# Patient Record
Sex: FEMALE | Race: BLACK OR AFRICAN AMERICAN | Employment: UNEMPLOYED | URBAN - METROPOLITAN AREA
[De-identification: names, ages, dates, MRNs, and addresses within clinical notes are randomized per-mention and may not be internally consistent; named-entity substitution may affect disease eponyms.]

---

## 2017-01-11 ENCOUNTER — TELEPHONE (OUTPATIENT)
Dept: FAMILY MEDICINE CLINIC | Age: 72
End: 2017-01-11

## 2017-01-16 NOTE — TELEPHONE ENCOUNTER
Received [employer] authorization, auth # [id number], patient approved for 99 visits at [address] and [facility] faxed Brown authorization to [address] [facility] @ Fax [phone] with confirmation of receipt received. Office will contact Rhonda Villagran to schedule or I will schedule pt an appt if needed.

## 2017-01-31 ENCOUNTER — OFFICE VISIT (OUTPATIENT)
Dept: FAMILY MEDICINE CLINIC | Age: 72
End: 2017-01-31

## 2017-01-31 VITALS
HEIGHT: 56 IN | OXYGEN SATURATION: 92 % | BODY MASS INDEX: 25.42 KG/M2 | WEIGHT: 113 LBS | TEMPERATURE: 95.8 F | DIASTOLIC BLOOD PRESSURE: 67 MMHG | SYSTOLIC BLOOD PRESSURE: 141 MMHG | RESPIRATION RATE: 16 BRPM | HEART RATE: 56 BPM

## 2017-01-31 DIAGNOSIS — E11.65 TYPE 2 DIABETES MELLITUS WITH HYPERGLYCEMIA, WITHOUT LONG-TERM CURRENT USE OF INSULIN (HCC): Primary | ICD-10-CM

## 2017-01-31 DIAGNOSIS — I10 ESSENTIAL HYPERTENSION: Chronic | ICD-10-CM

## 2017-01-31 DIAGNOSIS — D50.9 IRON DEFICIENCY ANEMIA, UNSPECIFIED IRON DEFICIENCY ANEMIA TYPE: ICD-10-CM

## 2017-01-31 DIAGNOSIS — N18.5 CKD (CHRONIC KIDNEY DISEASE), STAGE 5: Chronic | ICD-10-CM

## 2017-01-31 RX ORDER — FOLIC ACID/VIT B COMPLEX AND C 0.8 MG
0.8 TABLET ORAL DAILY
Refills: 11 | COMMUNITY
Start: 2017-01-18 | End: 2020-01-01 | Stop reason: SDUPTHER

## 2017-01-31 NOTE — PATIENT INSTRUCTIONS
Iron Deficiency Anemia: Care Instructions  Your Care Instructions    Anemia means that you do not have enough red blood cells. Red blood cells carry oxygen around your body. When you have anemia, it can make you pale, weak, and tired. Many things can cause anemia. The most common cause is loss of blood. This can happen if you have heavy menstrual periods. It can also happen if you have bleeding in your stomach or bowel. You can also get anemia if you don't have enough iron in your diet or if it's hard for your body to absorb iron. In some cases, pregnancy causes anemia. That's because a pregnant woman needs more iron. Your doctor may do more tests to find the cause of your anemia. If a disease or other health problem is causing it, your doctor will treat that problem. It's important to follow up with your doctor to make sure that your iron level returns to normal.  Follow-up care is a key part of your treatment and safety. Be sure to make and go to all appointments, and call your doctor if you are having problems. It's also a good idea to know your test results and keep a list of the medicines you take. How can you care for yourself at home? · If your doctor recommended iron pills, take them as directed. ¨ Try to take the pills on an empty stomach. You can do this about 1 hour before or 2 hours after meals. But you may need to take iron with food to avoid an upset stomach. ¨ Do not take antacids or drink milk or anything with caffeine within 2 hours of when you take your iron. They can keep your body from absorbing the iron well. ¨ Vitamin C helps your body absorb iron. You may want to take iron pills with a glass of orange juice or some other food high in vitamin C.  ¨ Iron pills may cause stomach problems. These include heartburn, nausea, diarrhea, constipation, and cramps. It can help to drink plenty of fluids and include fruits, vegetables, and fiber in your diet.   ¨ It's normal for iron pills to make your stool a greenish or grayish black. But internal bleeding can also cause dark stool. So it's important to tell your doctor about any color changes. ¨ Call your doctor if you think you are having a problem with your iron pills. Even after you start to feel better, it will take several months for your body to build up its supply of iron. ¨ If you miss a pill, don't take a double dose. ¨ Keep iron pills out of the reach of small children. Too much iron can be very dangerous. · Eat foods with a lot of iron. These include red meat, shellfish, poultry, and eggs. They also include beans, raisins, whole-grain bread, and leafy green vegetables. · Steam your vegetables. This is the best way to prepare them if you want to get as much iron as possible. · Be safe with medicines. Do not take nonsteroidal anti-inflammatory pain relievers unless your doctor tells you to. These include aspirin, naproxen (Aleve), and ibuprofen (Advil, Motrin). · Liquid iron can stain your teeth. But you can mix it with water or juice and drink it with a straw. Then it won't get on your teeth. When should you call for help? Call 911 anytime you think you may need emergency care. For example, call if:  · You passed out (lost consciousness). · You vomit blood or what looks like coffee grounds. · You pass maroon or very bloody stools. Call your doctor now or seek immediate medical care if:  · Your stools are black and look like tar, or they have streaks of blood. · You are dizzy or lightheaded, or you feel like you may faint. Watch closely for changes in your health, and be sure to contact your doctor if:  · Your fatigue and weakness continue or get worse. · You have side effects from taking iron pills, such as nausea, vomiting, constipation, diarrhea, or heartburn. · You do not get better as expected. Where can you learn more? Go to http://yoel.info/.   Enter F242 in the search box to learn more about \"Iron Deficiency Anemia: Care Instructions. \"  Current as of: February 5, 2016  Content Version: 11.1  © 7783-6312 [employer]. Care instructions adapted under license by [employer] (which disclaims liability or warranty for this information). If you have questions about a medical condition or this instruction, always ask your healthcare professional. [address] any warranty or liability for your use of this information.

## 2017-01-31 NOTE — MR AVS SNAPSHOT
Visit Information Date & Time Provider Department Dept. Phone Encounter #  
 1/31/2017 11:00 AM Elizabeth Aldrich MD Fabiola Jarrell [id number] Follow-up Instructions Return in about 3 months (around 4/30/2017) for follow up. Your Appointments 3/22/2017 10:00 AM  
Office Visit with MD Linda Reinoso 23 ([facility]) Appt Note: [address] [facility] [address]  
  
   
 [address] Upcoming Health Maintenance Date Due DTaP/Tdap/Td series (1 - Tdap) 12/6/1964 ZOSTER VACCINE AGE 60> 12/6/2003 Pneumococcal 65+ High/Highest Risk (2 of 2 - PPSV23) 4/8/2015 FOOT EXAM Q1 6/1/2015 EYE EXAM RETINAL OR DILATED Q1 9/28/2016 MEDICARE YEARLY EXAM 3/22/2017 HEMOGLOBIN A1C Q6M 4/5/2017 LIPID PANEL Q1 9/20/2017 GLAUCOMA SCREENING Q2Y 9/28/2017 MICROALBUMIN Q1 1/31/2018 COLONOSCOPY 6/14/2018 BREAST CANCER SCRN MAMMOGRAM 7/15/2018 Allergies as of 1/31/2017  Review Complete On: 1/31/2017 By: Elizabeth Aldrich MD  
  
 Severity Noted Reaction Type Reactions Ace Inhibitors  08/04/2015    Cough Aspirin  01/09/2013    Other (comments) Hallucinations Hydralazine  08/04/2015    Other (comments) Hallucinations, dizziness and numbness in legs Current Immunizations  Reviewed on 1/31/2017 Name Date Influenza Vaccine 9/10/2016, 10/1/2009 Pneumococcal Conjugate (PCV-13) 8/14/2016 Pneumococcal Vaccine (Unspecified Type) 4/8/2010 Tdap 12/1/2016 Reviewed by Elizabeth Aldrich MD on 1/31/2017 at 11:42 AM  
 Reviewed by Elizabeth Aldrich MD on 1/31/2017 at 11:43 AM  
 Reviewed by Elizabeth Aldrich MD on 1/31/2017 at 11:44 AM  
You Were Diagnosed With   
  
 Codes Comments  Type 2 diabetes mellitus with hyperglycemia, without long-term current use of insulin ([facility]    -  Primary ICD-10-CM: E11.65 ICD-9-CM: 250.00, 790.29 Essential hypertension     ICD-10-CM: I10 
ICD-9-CM: 401.9 CKD (chronic kidney disease), stage 5 (HCC)     ICD-10-CM: N18.5 ICD-9-CM: 585.5 Iron deficiency anemia, unspecified iron deficiency anemia type     ICD-10-CM: D50.9 ICD-9-CM: 280.9 Vitals BP Pulse Temp Resp Height(growth percentile) Weight(growth percentile) 141/67 (!) 56 95.8 °F (35.4 °C) (Oral) 16 4' 8\" (1.422 m) 113 lb (51.3 kg) SpO2 BMI OB Status Smoking Status 92% 25.33 kg/m2 Postmenopausal Never Smoker Vitals History BMI and BSA Data Body Mass Index Body Surface Area  
 25.33 kg/m 2 1.42 m 2 Preferred Pharmacy Pharmacy Name Phone [facility] [id number] [place] [address] [address] [facility] [phone] Your Updated Medication List  
  
   
This list is accurate as of: 1/31/17 11:54 AM.  Always use your most recent med list.  
  
  
  
  
 atorvastatin 40 mg tablet Commonly known as:  LIPITOR Take 20 mg by mouth daily. HumaLOG 100 unit/mL injection Generic drug:  insulin lispro  
by SubCUTAneous route. Sliding scale LANTUS 100 unit/mL injection Generic drug:  insulin glargine 2 Units by SubCUTAneous route nightly. levothyroxine 75 mcg tablet Commonly known as:  SYNTHROID Take  by mouth Daily (before breakfast). losartan 100 mg tablet Commonly known as:  COZAAR Take 1 Tab by mouth daily. metoprolol tartrate 50 mg tablet Commonly known as:  LOPRESSOR Take  by mouth daily. OXYGEN-AIR DELIVERY SYSTEMS Take 3 L by inhalation continuous. pantoprazole 40 mg tablet Commonly known as:  PROTONIX Take 1 Tab by mouth every twelve (12) hours. SYMBICORT 160-4.5 mcg/actuation HFA inhaler Generic drug:  budesonide-formoterol Take 2 Puffs by inhalation two (2) times a day. Follow-up Instructions Return in about 3 months (around 4/30/2017) for follow up. To-Do List   
 01/31/2017 Lab:  CBC WITH AUTOMATED DIFF   
  
 01/31/2017 Lab:  FERRITIN   
  
 01/31/2017 Lab:  HEMOGLOBIN A1C W/O EAG   
  
 01/31/2017 Lab:  IRON PROFILE Patient Instructions Iron Deficiency Anemia: Care Instructions Your Care Instructions Anemia means that you do not have enough red blood cells. Red blood cells carry oxygen around your body. When you have anemia, it can make you pale, weak, and tired. Many things can cause anemia. The most common cause is loss of blood. This can happen if you have heavy menstrual periods. It can also happen if you have bleeding in your stomach or bowel. You can also get anemia if you don't have enough iron in your diet or if it's hard for your body to absorb iron. In some cases, pregnancy causes anemia. That's because a pregnant woman needs more iron. Your doctor may do more tests to find the cause of your anemia. If a disease or other health problem is causing it, your doctor will treat that problem. It's important to follow up with your doctor to make sure that your iron level returns to normal. 
Follow-up care is a key part of your treatment and safety. Be sure to make and go to all appointments, and call your doctor if you are having problems. It's also a good idea to know your test results and keep a list of the medicines you take. How can you care for yourself at home? · If your doctor recommended iron pills, take them as directed. ¨ Try to take the pills on an empty stomach. You can do this about 1 hour before or 2 hours after meals. But you may need to take iron with food to avoid an upset stomach. ¨ Do not take antacids or drink milk or anything with caffeine within 2 hours of when you take your iron. They can keep your body from absorbing the iron well. ¨ Vitamin C helps your body absorb iron.  You may want to take iron pills with a glass of orange juice or some other food high in vitamin C. 
¨ Iron pills may cause stomach problems. These include heartburn, nausea, diarrhea, constipation, and cramps. It can help to drink plenty of fluids and include fruits, vegetables, and fiber in your diet. ¨ It's normal for iron pills to make your stool a greenish or grayish black. But internal bleeding can also cause dark stool. So it's important to tell your doctor about any color changes. ¨ Call your doctor if you think you are having a problem with your iron pills. Even after you start to feel better, it will take several months for your body to build up its supply of iron. ¨ If you miss a pill, don't take a double dose. ¨ Keep iron pills out of the reach of small children. Too much iron can be very dangerous. · Eat foods with a lot of iron. These include red meat, shellfish, poultry, and eggs. They also include beans, raisins, whole-grain bread, and leafy green vegetables. · Steam your vegetables. This is the best way to prepare them if you want to get as much iron as possible. · Be safe with medicines. Do not take nonsteroidal anti-inflammatory pain relievers unless your doctor tells you to. These include aspirin, naproxen (Aleve), and ibuprofen (Advil, Motrin). · Liquid iron can stain your teeth. But you can mix it with water or juice and drink it with a straw. Then it won't get on your teeth. When should you call for help? Call 911 anytime you think you may need emergency care. For example, call if: 
· You passed out (lost consciousness). · You vomit blood or what looks like coffee grounds. · You pass maroon or very bloody stools. Call your doctor now or seek immediate medical care if: 
· Your stools are black and look like tar, or they have streaks of blood. · You are dizzy or lightheaded, or you feel like you may faint. Watch closely for changes in your health, and be sure to contact your doctor if: · Your fatigue and weakness continue or get worse. · You have side effects from taking iron pills, such as nausea, vomiting, constipation, diarrhea, or heartburn. · You do not get better as expected. Where can you learn more? Go to http://mihai-zach.info/. Enter G979 in the search box to learn more about \"Iron Deficiency Anemia: Care Instructions. \" Current as of: February 5, 2016 Content Version: 11.1 © 8559-4998 [employer]. Care instructions adapted under license by [employer] (which disclaims liability or warranty for this information). If you have questions about a medical condition or this instruction, always ask your healthcare professional. [address] any warranty or liability for your use of this information. Introducing [facility]! Dear Haritha Templeton: 
Thank you for requesting a [employer] account. Our records indicate that you have previously registered for a [employer] account but its currently inactive. Please call our [employer] support line at [phone]. Additional Information If you have questions, please visit the Frequently Asked Questions section of the [employer] website at [email or web address] ([email or web address] Remember, [employer] is NOT to be used for urgent needs. For medical emergencies, dial 911. Now available from your iPhone and Android! Please provide this summary of care documentation to your next provider. Your primary care clinician is listed as Bernard Agosto. If you have any questions after today's visit, please call [phone].

## 2017-01-31 NOTE — PROGRESS NOTES
Yue Garcia is here today to follow up for chronic conditions. 1. Have you been to the ER, urgent care clinic since your last visit? Hospitalized since your last visit? No    2. Have you seen or consulted any other health care providers outside of the [address] [place] [address] since your last visit? Include any pap smears or colon screening.  No

## 2017-01-31 NOTE — PROGRESS NOTES
Chief Complaint   Patient presents with    Follow Up Chronic Condition       Pt is a 66y.o. year old female who presents for follow up of her chronic medical problems; brought by her  as always    Since last visit-now on HD  Getting ready to have cath on the left chest out, has AV shunt on the left arm    BP Readings from Last 3 Encounters:   01/31/17 141/67   11/02/16 124/63   11/01/16 167/66   Repeat BP-still up    Meds reviewed with -off Phoslo and on a Renavite; updated in chart   also says she was taken off iron pills; that Procrit never really helped her anemia  Has been eating a lot of ice recently so  thinks her iron is low again    Lab Results   Component Value Date/Time    TSH 0.64 10/05/2016 08:40 AM   On Synthroid-compliant  No recent weight changes    Lab Results     LIPID COMPLETE [address] [facility] [address]  Component Name Value Ref Range   CHOLESTEROL 152 110-200 mg/dL   TRIGLYCERIDE 66  mg/dL   HDL 91 (H) 40-59 mg/dL   LDL CALCULATION 48 (L) 50-99 mg/dL   VLDL CALCULATION 13 8-30 mg/dL       On home O2 for COPD-see Active DSP for this-has to reschedule appt    Rarely takes Humalog, no longer on Lantus  Sees Endo-last A1c was normal   A1C was 5.5 on 9/19/2016    Health Maintenance Due   Topic Date Due    DTaP/Tdap/Td series (1 - Tdap) 12/06/1964-got it at dialysis along with Hep B vaccine    ZOSTER VACCINE AGE 60>  12/06/2003    Pneumococcal 65+ High/Highest Risk (2 of 2 - PPSV23) 04/08/2015-had it at . [facility] Q1  06/01/2015-next visit   [address] Q1  09/28/2016- will schedule         ROS:    Pt denies: Wt loss, Fever/Chills, HA, Visual changes, Fatigue, Chest pain, SOB, LEBLANC, Abd pain, N/V/D/C, Blood in stool or urine, Edema. Pertinent positive as above in HPI.  All others were negative    Patient Active Problem List   Diagnosis Code    Gout M10.9    HTN (hypertension) I10    Hyperlipidemia E78.5    DM (diabetes mellitus) ([facility] E11.9    CKD (chronic kidney disease) N18.9    Hypothyroidism E03.9    Anemia D64.9    Tubular adenoma of colon D12.6    Anemia in chronic kidney disease N18.9, D63.1    Chronic obstructive pulmonary disease (HCC) J44.9    MELANIE on CPAP G47.33    Lactic acidosis E87.2    GI bleed K92.2    UTI (urinary tract infection) N39.0    Acute blood loss anemia D62    Elevated LFTs R79.89    Pancreatitis K85.90    Coagulopathy ([facility]) D68.9    Elevated troponin R79.89    Hyperphosphatemia E83.39    Acute renal failure (ARF) ([facility]) N17.9    Anorexia R63.0    Vomiting R11.10    Altered mental status R41.82    CHF (congestive heart failure) ([facility]) I50.9    Thrombocytopenia due to blood loss D69.59       Past Medical History   Diagnosis Date    Anemia in CKD (chronic kidney disease)     Aortic stenosis     Asthma     Calculus of kidney     Cancer ([facility]      right kidney    Cardiomyopathy ([facility]      EF 20-25% (10/16), 55% (06/16)    Chronic kidney disease, stage V ([facility]     Diabetes ([facility]     Gout     Hypercholesterolemia     Hypertension     Primary hyperparathyroidism ([facility] 2010     s/p parathyroid adenoma excision    Secondary hyperparathyroidism (of renal origin)     Status post nephrectomy      right    Thyroid disease        Current Outpatient Prescriptions   Medication Sig Dispense Refill    FABBY-DARLIN 0.8 mg tab tablet Take 0.8 mg by mouth daily. 11    pantoprazole (PROTONIX) 40 mg tablet Take 1 Tab by mouth every twelve (12) hours. 60 Tab 3    losartan (COZAAR) 100 mg tablet Take 1 Tab by mouth daily. 30 Tab 3    levothyroxine (SYNTHROID) 75 mcg tablet Take  by mouth Daily (before breakfast).  OXYGEN-AIR DELIVERY SYSTEMS Take 3 L by inhalation continuous.  atorvastatin (LIPITOR) 40 mg tablet Take 20 mg by mouth daily. 5    metoprolol (LOPRESSOR) 50 mg tablet Take  by mouth daily.  insulin lispro (HUMALOG) 100 unit/mL injection by SubCUTAneous route. Sliding scale       budesonide-formoterol (SYMBICORT) 160-4.5 mcg/actuation HFA inhaler Take 2 Puffs by inhalation two (2) times a day.  insulin glargine (LANTUS) 100 unit/mL injection 2 Units by SubCUTAneous route nightly. History   Smoking Status    Never Smoker   Smokeless Tobacco    Never Used       Allergies   Allergen Reactions    Ace Inhibitors Cough    Aspirin Other (comments)     Hallucinations    Hydralazine Other (comments)     Hallucinations, dizziness and numbness in legs        Patient Labs were reviewed: yes      Patient Past Records were reviewed:  yes        Objective:     Vitals:    01/31/17 1122   BP: 141/67   Pulse: (!) 56   Resp: 16   Temp: 95.8 °F (35.4 °C)   TempSrc: Oral   SpO2: 92%   Weight: 113 lb (51.3 kg)   Height: 4' 8\" (1.422 m)     Body mass index is 25.33 kg/(m^2). Exam:   Appearance: alert, well appearing,  oriented to person, place, and time, acyanotic, in no respiratory distress and well hydrated. Wheelchair bound  HEENT:  NC/AT, pink conj, anicteric sclerae  Neck:  No cervical lymphadenopathy, no JVD, no thyromegaly, no carotid bruit  Heart:  RRR without M/R/G  Lungs:  CTAB, no rhonchi, rales, or wheezes with good air exchange   Abdomen:  Non-tender, pos bowel sounds, no hepatosplenomegaly  Ext:  No C/C/E, AV shunt on the left UE    Skin: no rash  Neuro: no lateralizing signs, CNs II-XII intact      Assessment/ Plan:   Aline Simmons was seen today for follow up chronic condition. Diagnoses and all orders for this visit:    Type 2 diabetes mellitus with hyperglycemia, without long-term current use of insulin (HCC)-still sees Endo; continue to watch diet now that she is off any insulin; rarely needs SSI  -     HEMOGLOBIN A1C W/O EAG; Future    Essential hypertension-elevated today; advised home monitoring  -     CBC WITH AUTOMATED DIFF;  Future    CKD (chronic kidney disease), stage 5 (HCC)-on HD    Iron deficiency anemia, unspecified iron deficiency anemia type-check iron levels now that she is off iron/I suspect she may need iron infusion  -     FERRITIN; Future  -     IRON PROFILE; Future    Follow-up Disposition:  Return in about 3 months (around 4/30/2017) for follow up. I have discussed the diagnosis with the patient and the intended plan as seen in the above orders. The patient has received an After-Visit Summary and questions were answered concerning future plans. Medication Side Effects and Warnings were discussed with patient: yes    Patient verbalized understanding of above instructions.     Miriam Lopez MD  Internal Medicine  [facility]

## 2017-02-02 LAB
BASOPHILS # BLD AUTO: 0.1 X10E3/UL (ref 0–0.2)
BASOPHILS NFR BLD AUTO: 1 %
EOSINOPHIL # BLD AUTO: 0.1 X10E3/UL (ref 0–0.4)
EOSINOPHIL NFR BLD AUTO: 3 %
ERYTHROCYTE [DISTWIDTH] IN BLOOD BY AUTOMATED COUNT: 19.2 % (ref 12.3–15.4)
FERRITIN SERPL-MCNC: 893 NG/ML (ref 15–150)
HBA1C MFR BLD: 5.2 % (ref 4.8–5.6)
HCT VFR BLD AUTO: 34.4 % (ref 34–46.6)
HGB BLD-MCNC: 10.8 G/DL (ref 11.1–15.9)
IMM GRANULOCYTES # BLD: 0 X10E3/UL (ref 0–0.1)
IMM GRANULOCYTES NFR BLD: 0 %
IRON SATN MFR SERPL: 48 % (ref 15–55)
IRON SERPL-MCNC: 89 UG/DL (ref 27–139)
LYMPHOCYTES # BLD AUTO: 1.3 X10E3/UL (ref 0.7–3.1)
LYMPHOCYTES NFR BLD AUTO: 29 %
MCH RBC QN AUTO: 28.3 PG (ref 26.6–33)
MCHC RBC AUTO-ENTMCNC: 31.4 G/DL (ref 31.5–35.7)
MCV RBC AUTO: 90 FL (ref 79–97)
MONOCYTES # BLD AUTO: 0.5 X10E3/UL (ref 0.1–0.9)
MONOCYTES NFR BLD AUTO: 11 %
NEUTROPHILS # BLD AUTO: 2.5 X10E3/UL (ref 1.4–7)
NEUTROPHILS NFR BLD AUTO: 56 %
PLATELET # BLD AUTO: 153 X10E3/UL (ref 150–379)
RBC # BLD AUTO: 3.81 X10E6/UL (ref 3.77–5.28)
TIBC SERPL-MCNC: 184 UG/DL (ref 250–450)
UIBC SERPL-MCNC: 95 UG/DL (ref 118–369)
WBC # BLD AUTO: 4.5 X10E3/UL (ref 3.4–10.8)

## 2017-02-13 ENCOUNTER — ANESTHESIA EVENT (OUTPATIENT)
Dept: SURGERY | Age: 72
End: 2017-02-13
Payer: MEDICARE

## 2017-02-14 ENCOUNTER — ANESTHESIA (OUTPATIENT)
Dept: SURGERY | Age: 72
End: 2017-02-14
Payer: MEDICARE

## 2017-02-14 ENCOUNTER — HOSPITAL ENCOUNTER (OUTPATIENT)
Age: 72
Setting detail: OUTPATIENT SURGERY
Discharge: HOME | End: 2017-02-14
Attending: SURGERY | Admitting: SURGERY
Payer: MEDICARE

## 2017-02-14 VITALS
HEIGHT: 56 IN | RESPIRATION RATE: 10 BRPM | SYSTOLIC BLOOD PRESSURE: 156 MMHG | WEIGHT: 114 LBS | OXYGEN SATURATION: 95 % | HEART RATE: 80 BPM | DIASTOLIC BLOOD PRESSURE: 82 MMHG | TEMPERATURE: 97.9 F | BODY MASS INDEX: 25.64 KG/M2

## 2017-02-14 LAB
BUN BLD-MCNC: 68 MG/DL (ref 7–18)
CHLORIDE BLD-SCNC: 110 MMOL/L (ref 100–108)
GLUCOSE BLD STRIP.AUTO-MCNC: 132 MG/DL (ref 70–110)
GLUCOSE BLD STRIP.AUTO-MCNC: 74 MG/DL (ref 70–110)
GLUCOSE BLD STRIP.AUTO-MCNC: 82 MG/DL (ref 74–106)
HCT VFR BLD CALC: 39 % (ref 36–49)
HGB BLD-MCNC: 13.3 G/DL (ref 12–16)
POTASSIUM BLD-SCNC: 5.9 MMOL/L (ref 3.5–5.5)
POTASSIUM SERPL-SCNC: 4.2 MMOL/L (ref 3.5–5.5)
SODIUM BLD-SCNC: 137 MMOL/L (ref 136–145)

## 2017-02-14 PROCEDURE — 74011000272 HC RX REV CODE- 272: Performed by: SURGERY

## 2017-02-14 PROCEDURE — 77030011640 HC PAD GRND REM COVD -A: Performed by: SURGERY

## 2017-02-14 PROCEDURE — 77030031139 HC SUT VCRL2 J&J -A: Performed by: SURGERY

## 2017-02-14 PROCEDURE — 82947 ASSAY GLUCOSE BLOOD QUANT: CPT

## 2017-02-14 PROCEDURE — 76010000162 HC OR TIME 1.5 TO 2 HR INTENSV-TIER 1: Performed by: SURGERY

## 2017-02-14 PROCEDURE — 74011250636 HC RX REV CODE- 250/636

## 2017-02-14 PROCEDURE — 77030010507 HC ADH SKN DERMBND J&J -B: Performed by: SURGERY

## 2017-02-14 PROCEDURE — 74011250636 HC RX REV CODE- 250/636: Performed by: SURGERY

## 2017-02-14 PROCEDURE — 36415 COLL VENOUS BLD VENIPUNCTURE: CPT | Performed by: ANESTHESIOLOGY

## 2017-02-14 PROCEDURE — 74011250637 HC RX REV CODE- 250/637: Performed by: NURSE ANESTHETIST, CERTIFIED REGISTERED

## 2017-02-14 PROCEDURE — 74011000258 HC RX REV CODE- 258: Performed by: SURGERY

## 2017-02-14 PROCEDURE — 77030002987 HC SUT PROL J&J -B: Performed by: SURGERY

## 2017-02-14 PROCEDURE — 77030018846 HC SOL IRR STRL H20 ICUM -A: Performed by: SURGERY

## 2017-02-14 PROCEDURE — 77030032490 HC SLV COMPR SCD KNE COVD -B: Performed by: SURGERY

## 2017-02-14 PROCEDURE — 74011000250 HC RX REV CODE- 250

## 2017-02-14 PROCEDURE — 76060000034 HC ANESTHESIA 1.5 TO 2 HR: Performed by: SURGERY

## 2017-02-14 PROCEDURE — 77030011265 HC ELECTRD BLD HEX COVD -A: Performed by: SURGERY

## 2017-02-14 PROCEDURE — 76210000016 HC OR PH I REC 1 TO 1.5 HR: Performed by: SURGERY

## 2017-02-14 PROCEDURE — 77030002996 HC SUT SLK J&J -A: Performed by: SURGERY

## 2017-02-14 PROCEDURE — 74011250636 HC RX REV CODE- 250/636: Performed by: NURSE ANESTHETIST, CERTIFIED REGISTERED

## 2017-02-14 PROCEDURE — 74011250637 HC RX REV CODE- 250/637

## 2017-02-14 PROCEDURE — 82962 GLUCOSE BLOOD TEST: CPT

## 2017-02-14 PROCEDURE — 77030020256 HC SOL INJ NACL 0.9%  500ML: Performed by: SURGERY

## 2017-02-14 PROCEDURE — 84132 ASSAY OF SERUM POTASSIUM: CPT | Performed by: ANESTHESIOLOGY

## 2017-02-14 PROCEDURE — 74011000250 HC RX REV CODE- 250: Performed by: NURSE ANESTHETIST, CERTIFIED REGISTERED

## 2017-02-14 PROCEDURE — 76210000021 HC REC RM PH II 0.5 TO 1 HR: Performed by: SURGERY

## 2017-02-14 PROCEDURE — 74011000258 HC RX REV CODE- 258

## 2017-02-14 RX ORDER — MIDAZOLAM HYDROCHLORIDE 1 MG/ML
INJECTION, SOLUTION INTRAMUSCULAR; INTRAVENOUS AS NEEDED
Status: DISCONTINUED | OUTPATIENT
Start: 2017-02-14 | End: 2017-02-14 | Stop reason: HOSPADM

## 2017-02-14 RX ORDER — FENTANYL CITRATE 50 UG/ML
25 INJECTION, SOLUTION INTRAMUSCULAR; INTRAVENOUS AS NEEDED
Status: DISCONTINUED | OUTPATIENT
Start: 2017-02-14 | End: 2017-02-14 | Stop reason: HOSPADM

## 2017-02-14 RX ORDER — ONDANSETRON 2 MG/ML
INJECTION INTRAMUSCULAR; INTRAVENOUS AS NEEDED
Status: DISCONTINUED | OUTPATIENT
Start: 2017-02-14 | End: 2017-02-14 | Stop reason: HOSPADM

## 2017-02-14 RX ORDER — INSULIN LISPRO 100 [IU]/ML
INJECTION, SOLUTION INTRAVENOUS; SUBCUTANEOUS ONCE
Status: DISCONTINUED | OUTPATIENT
Start: 2017-02-14 | End: 2017-02-14 | Stop reason: HOSPADM

## 2017-02-14 RX ORDER — MAGNESIUM SULFATE 100 %
4 CRYSTALS MISCELLANEOUS AS NEEDED
Status: DISCONTINUED | OUTPATIENT
Start: 2017-02-14 | End: 2017-02-14 | Stop reason: HOSPADM

## 2017-02-14 RX ORDER — ETOMIDATE 2 MG/ML
INJECTION INTRAVENOUS AS NEEDED
Status: DISCONTINUED | OUTPATIENT
Start: 2017-02-14 | End: 2017-02-14 | Stop reason: HOSPADM

## 2017-02-14 RX ORDER — DEXTROSE 50 % IN WATER (D50W) INTRAVENOUS SYRINGE
25-50 AS NEEDED
Status: DISCONTINUED | OUTPATIENT
Start: 2017-02-14 | End: 2017-02-14 | Stop reason: HOSPADM

## 2017-02-14 RX ORDER — SODIUM CHLORIDE 9 MG/ML
25 INJECTION, SOLUTION INTRAVENOUS CONTINUOUS
Status: DISCONTINUED | OUTPATIENT
Start: 2017-02-14 | End: 2017-02-14 | Stop reason: HOSPADM

## 2017-02-14 RX ORDER — ALBUTEROL SULFATE 90 UG/1
AEROSOL, METERED RESPIRATORY (INHALATION) AS NEEDED
Status: DISCONTINUED | OUTPATIENT
Start: 2017-02-14 | End: 2017-02-14 | Stop reason: HOSPADM

## 2017-02-14 RX ORDER — ALBUTEROL SULFATE 0.83 MG/ML
SOLUTION RESPIRATORY (INHALATION)
Status: COMPLETED
Start: 2017-02-14 | End: 2017-02-14

## 2017-02-14 RX ORDER — FAMOTIDINE 20 MG/1
20 TABLET, FILM COATED ORAL ONCE
Status: COMPLETED | OUTPATIENT
Start: 2017-02-14 | End: 2017-02-14

## 2017-02-14 RX ORDER — ONDANSETRON 2 MG/ML
4 INJECTION INTRAMUSCULAR; INTRAVENOUS ONCE
Status: DISCONTINUED | OUTPATIENT
Start: 2017-02-14 | End: 2017-02-14 | Stop reason: HOSPADM

## 2017-02-14 RX ORDER — HEPARIN SODIUM 1000 [USP'U]/ML
INJECTION, SOLUTION INTRAVENOUS; SUBCUTANEOUS AS NEEDED
Status: DISCONTINUED | OUTPATIENT
Start: 2017-02-14 | End: 2017-02-14 | Stop reason: HOSPADM

## 2017-02-14 RX ORDER — ALBUTEROL SULFATE 0.83 MG/ML
2.5 SOLUTION RESPIRATORY (INHALATION)
Status: COMPLETED | OUTPATIENT
Start: 2017-02-14 | End: 2017-02-14

## 2017-02-14 RX ORDER — FENTANYL CITRATE 50 UG/ML
INJECTION, SOLUTION INTRAMUSCULAR; INTRAVENOUS AS NEEDED
Status: DISCONTINUED | OUTPATIENT
Start: 2017-02-14 | End: 2017-02-14 | Stop reason: HOSPADM

## 2017-02-14 RX ORDER — OXYCODONE AND ACETAMINOPHEN 5; 325 MG/1; MG/1
1 TABLET ORAL
Qty: 20 TAB | Refills: 0 | Status: SHIPPED | OUTPATIENT
Start: 2017-02-14 | End: 2017-07-14

## 2017-02-14 RX ADMIN — HEPARIN SODIUM 3000 UNITS: 1000 INJECTION, SOLUTION INTRAVENOUS; SUBCUTANEOUS at 14:36

## 2017-02-14 RX ADMIN — FENTANYL CITRATE 25 MCG: 50 INJECTION, SOLUTION INTRAMUSCULAR; INTRAVENOUS at 13:59

## 2017-02-14 RX ADMIN — ALBUTEROL SULFATE 2.5 MG: 2.5 SOLUTION RESPIRATORY (INHALATION) at 16:02

## 2017-02-14 RX ADMIN — FENTANYL CITRATE 25 MCG: 50 INJECTION, SOLUTION INTRAMUSCULAR; INTRAVENOUS at 13:56

## 2017-02-14 RX ADMIN — ALBUTEROL SULFATE 2.5 MG: 0.83 SOLUTION RESPIRATORY (INHALATION) at 16:02

## 2017-02-14 RX ADMIN — ETOMIDATE 12 MG: 2 INJECTION INTRAVENOUS at 13:53

## 2017-02-14 RX ADMIN — ALBUTEROL SULFATE 6 PUFF: 90 AEROSOL, METERED RESPIRATORY (INHALATION) at 14:00

## 2017-02-14 RX ADMIN — CEFAZOLIN 1 G: 1 INJECTION, POWDER, FOR SOLUTION INTRAVENOUS at 14:00

## 2017-02-14 RX ADMIN — ALBUTEROL SULFATE 4 PUFF: 90 AEROSOL, METERED RESPIRATORY (INHALATION) at 15:30

## 2017-02-14 RX ADMIN — FAMOTIDINE 20 MG: 20 TABLET ORAL at 11:54

## 2017-02-14 RX ADMIN — FENTANYL CITRATE 25 MCG: 50 INJECTION, SOLUTION INTRAMUSCULAR; INTRAVENOUS at 16:20

## 2017-02-14 RX ADMIN — ONDANSETRON 4 MG: 2 INJECTION INTRAMUSCULAR; INTRAVENOUS at 15:04

## 2017-02-14 RX ADMIN — MIDAZOLAM HYDROCHLORIDE 0.5 MG: 1 INJECTION, SOLUTION INTRAMUSCULAR; INTRAVENOUS at 13:49

## 2017-02-14 RX ADMIN — SODIUM CHLORIDE 25 ML/HR: 900 INJECTION, SOLUTION INTRAVENOUS at 12:56

## 2017-02-14 RX ADMIN — MIDAZOLAM HYDROCHLORIDE 0.5 MG: 1 INJECTION, SOLUTION INTRAMUSCULAR; INTRAVENOUS at 13:46

## 2017-02-14 RX ADMIN — DEXTROSE MONOHYDRATE 12.5 G: 25 INJECTION, SOLUTION INTRAVENOUS at 15:58

## 2017-02-14 NOTE — IP AVS SNAPSHOT
Efren Swanson 
 
 
 [address] 
[phone] Patient: Henrietta Ayala MRN: [id number] :1945 You are allergic to the following Allergen Reactions Ace Inhibitors Cough Aspirin Other (comments) Hallucinations Hydralazine Other (comments) Hallucinations, dizziness and numbness in legs Recent Documentation Height Weight BMI OB Status Smoking Status 1.422 m 51.7 kg 25.56 kg/m2 Postmenopausal Never Smoker Emergency Contacts Name Discharge Info Relation Home Work Mobile Taisha Ramon [address] [employer] [3] Spouse [3] [phone] About your hospitalization You were admitted on:  2017 [facility] You were discharged on:  2017 Unit phone number:  [phone] Why you were hospitalized Your primary diagnosis was:  Not on File Providers Seen During Your Hospitalizations Provider Role Specialty Primary office phone Susan Martel MD Attending Provider Vascular Surgery [phone] Your Primary Care Physician (PCP) Primary Care Physician Office Phone Office Fax Rose Seton [phone] Follow-up Information Follow up With Details Comments Contact Info Roz Contreras MD   [address] [facility] [address] [phone] Your Appointments 2017 10:00 AM EDT Office Visit with Roz Contreras MD  
[place] [address] ([address] [facility]) [place] [address] [address] [phone] Current Discharge Medication List  
  
START taking these medications Dose & Instructions Dispensing Information Comments Morning Noon Evening Bedtime  
 oxyCODONE-acetaminophen 5-325 mg per tablet Commonly known as:  PERCOCET Your next dose is: Today, Tomorrow Other:  _________ Dose:  1 Tab Take 1 Tab by mouth every four (4) hours as needed for Pain. Max Daily Amount: 6 Tabs. Quantity:  20 Tab Refills:  0 CONTINUE these medications which have NOT CHANGED Dose & Instructions Dispensing Information Comments Morning Noon Evening Bedtime  
 atorvastatin 40 mg tablet Commonly known as:  LIPITOR Your next dose is: Today, Tomorrow Other:  _________ Dose:  20 mg Take 20 mg by mouth daily. Refills:  5 HumaLOG 100 unit/mL injection Generic drug:  insulin lispro Your next dose is: Today, Tomorrow Other:  _________  
   
   
 by SubCUTAneous route. Sliding scale Refills:  0  
     
   
   
   
  
 LANTUS 100 unit/mL injection Generic drug:  insulin glargine Your next dose is: Today, Tomorrow Other:  _________ Dose:  2 Units 2 Units by SubCUTAneous route nightly. Refills:  0  
     
   
   
   
  
 levothyroxine 75 mcg tablet Commonly known as:  SYNTHROID Your next dose is: Today, Tomorrow Other:  _________ Take  by mouth Daily (before breakfast). Refills:  0  
     
   
   
   
  
 losartan 100 mg tablet Commonly known as:  COZAAR Your next dose is: Today, Tomorrow Other:  _________ Dose:  100 mg Take 1 Tab by mouth daily. Quantity:  30 Tab Refills:  3  
     
   
   
   
  
 metoprolol tartrate 50 mg tablet Commonly known as:  LOPRESSOR Your next dose is: Today, Tomorrow Other:  _________ Take  by mouth daily. Refills:  0 OXYGEN-AIR DELIVERY SYSTEMS Your next dose is: Today, Tomorrow Other:  _________ Dose:  3 L Take 3 L by inhalation continuous. Refills:  0  
     
   
   
   
  
 pantoprazole 40 mg tablet Commonly known as:  PROTONIX Your next dose is: Today, Tomorrow Other:  _________ Dose:  40 mg Take 1 Tab by mouth every twelve (12) hours. Quantity:  60 Tab Refills:  3 FABBY-DARLIN 0.8 mg Tab tablet Generic drug:  b complex-vitamin c-folic acid 0.8 mg Your next dose is: Today, Tomorrow Other:  _________ Dose:  0.8 mg Take 0.8 mg by mouth daily. Refills:  11  
     
   
   
   
  
 SYMBICORT 160-4.5 mcg/actuation HFA inhaler Generic drug:  budesonide-formoterol Your next dose is: Today, Tomorrow Other:  _________ Dose:  2 Puff Take 2 Puffs by inhalation two (2) times a day. Refills:  0 Where to Get Your Medications Information on where to get these meds will be given to you by the nurse or doctor. ! Ask your nurse or doctor about these medications  
  oxyCODONE-acetaminophen 5-325 mg per tablet Discharge Instructions Hemodialysis Access: What to Expect at [facility] Your Recovery Hemodialysis is a way to remove wastes from the blood when your kidneys can no longer do the job. It is not a cure, but it can help you live longer and feel better. It is a lifesaving treatment when you have kidney failure. Hemodialysis is often called dialysis. Your doctor created a place (called an access) in your arm for your blood to flow in and out of your body during your dialysis sessions. Your arm will probably be bruised and swollen. It may hurt. The cut (incision) may bleed. The pain and bleeding will get better over several days. You will probably need only over-the-counter pain medicine. You can reduce swelling by propping your arm on 1 or 2 pillows and keeping your elbow straight. You will have stitches. These may dissolve on their own, or your doctor will tell you when to come in to have them removed.  You should also be able to return to work in a few days. You may feel some coolness or numbness in your hand. These feelings usually go away in a few weeks. Your doctor may suggest squeezing a soft object. This will strengthen your access and may make hemodialysis faster and easier. You should always be able to feel blood rushing through the fistula or graft. It feels like a slight vibration when you put your fingers on the skin over the fistula or graft. This feeling is called a thrill or pulse. This care sheet gives you a general idea about how long it will take for you to recover. But each person recovers at a different pace. Follow the steps below to get better as quickly as possible. How can you care for yourself at home? Activity · Rest when you feel tired. Getting enough sleep will help you recover. Do not lie on or sleep on the arm with the access. · Avoid activities such as washing windows or gardening that put stress on the arm with the access. · You may use your arm, but do not lift anything that weighs more than about 15 pounds. This may include a child, heavy grocery bags, a heavy briefcase or backpack, cat litter or dog food bags, or a vacuum . · You can shower, but keep the access dry for the first 2 days. Cover the area with a plastic bag to keep it dry. · Do not soak or scrub the incision until it has healed. · Wear an arm guard to protect the area if you play sports or work with your arms. · You may drive when your doctor says it is okay. This is usually in 1 to 2 days. · Most people are able to return to work about 1 or 2 days after surgery. Diet · Follow an eating plan that is good for your kidneys. A registered dietitian can help you make a meal plan that is right for you. You may need to limit protein, salt, fluids, and certain foods. Medicines · Your doctor will tell you if and when you can restart your medicines. He or she will also give you instructions about taking any new medicines. · If you take blood thinners, such as warfarin (Coumadin), clopidogrel (Plavix), or aspirin, be sure to talk to your doctor. He or she will tell you if and when to start taking those medicines again. Make sure that you understand exactly what your doctor wants you to do. · Take pain medicines exactly as directed. ¨ If the doctor gave you a prescription medicine for pain, take it as prescribed. ¨ If you are not taking a prescription pain medicine, ask your doctor if you can take acetaminophen (Tylenol). Do not take ibuprofen (Advil, Motrin) or naproxen (Aleve), or similar medicines, unless your doctor tells you to. They may make chronic kidney disease worse. ¨ Do not take two or more pain medicines at the same time unless the doctor told you to. Many pain medicines have acetaminophen, which is Tylenol. Too much acetaminophen (Tylenol) can be harmful. · If you think your pain medicine is making you sick to your stomach: 
¨ Take your medicine after meals (unless your doctor has told you not to). ¨ Ask your doctor for a different pain medicine. · If your doctor prescribed antibiotics, take them as directed. Do not stop taking them just because you feel better. You need to take the full course of antibiotics. Incision care · Keep the area dry for 2 days. After 2 days, wash the area with an antibacterial soap every day, and always before dialysis. · Do not soak or scrub the incision until it has healed. · If you have a bandage, change it every day or as your doctor recommends. Your doctor will tell you when you can remove it. Exercise · Squeeze a soft ball or other object as your doctor tells you. This will help blood flow through the access and help prevent blood clots. Elevation · Prop up the sore arm on a pillow anytime you sit or lie down during the next 3 days. Try to keep it above the level of your heart. This will help reduce swelling. Other instructions · Every day, check your access for a pulse or thrill in the fistula or graft area. A thrill is a vibration. To feel a pulse or thrill, place the first two fingers of your hand over the access. · Do not bump your arm. · Do not wear tight clothing, jewelry, or anything else that may squeeze the access. · Use your other arm to have blood drawn or blood pressure taken. · Do not put cream or lotion on or near the access. · Make sure all doctors you deal with know you have a vascular access. Follow-up care is a key part of your treatment and safety. Be sure to make and go to all appointments, and call your doctor if you are having problems. It's also a good idea to know your test results and keep a list of the medicines you take. When should you call for help? Call 911 anytime you think you may need emergency care. For example, call if: 
· You passed out (lost consciousness). · You have severe trouble breathing. · You have sudden chest pain and shortness of breath, or you cough up blood. · You have a rapid pulse or heartbeat. Call your doctor now or seek immediate medical care if: 
· Your hand or arm is cold or dark-colored. · You have sudden bulging around your access. · You have no pulse or thrill in your access, or you hear no sound of blood in your access. · Bleeding after dialysis lasts longer than usual. 
· You have severe pain that does not get better after you take pain medicine. · You have a fever over 100°F. 
· You have loose stitches, or your incision comes open. · You are bleeding from the incision more than you had been. · You have signs of infection, such as: 
¨ Increased pain, swelling, warmth, or redness. ¨ Red streaks leading from the incision. ¨ Pus draining from the incision. ¨ Swollen lymph nodes in your neck, armpits, or groin. ¨ A fever. Watch closely for changes in your health, and be sure to contact your doctor if you have any problems. Where can you learn more? Go to http://mihai-zach.info/. Enter P616 in the search box to learn more about \"Hemodialysis Access: What to Expect at Home. \" Current as of: November 20, 2015 Content Version: 11.1 © 7267-7198 [employer]. Care instructions adapted under license by [employer] (which disclaims liability or warranty for this information). If you have questions about a medical condition or this instruction, always ask your healthcare professional. [address] any warranty or liability for your use of this information. DISCHARGE SUMMARY from Nurse The following personal items are in your possession at time of discharge: 
 
Dental Appliances: None Visual Aid: At bedside Home Medications: None Jewelry: None Clothing: Pants, Shirt, Jacket/Coat, Undergarments, Footwear Other Valuables: [place] PATIENT INSTRUCTIONS: 
 
After general anesthesia or intravenous sedation, for 24 hours or while taking prescription Narcotics: · Limit your activities · Do not drive and operate hazardous machinery · Do not make important personal or business decisions · Do  not drink alcoholic beverages · If you have not urinated within 8 hours after discharge, please contact your surgeon on call. Report the following to your surgeon: 
· Excessive pain, swelling, redness or odor of or around the surgical area · Temperature over 100.5 · Nausea and vomiting lasting longer than 4 hours or if unable to take medications · Any signs of decreased circulation or nerve impairment to extremity: change in color, persistent  numbness, tingling, coldness or increase pain · Any questions What to do at Home: These are general instructions for a healthy lifestyle: No smoking/ No tobacco products/ Avoid exposure to second hand smoke Surgeon General's Warning:  Quitting smoking now greatly reduces serious risk to your health. Obesity, smoking, and sedentary lifestyle greatly increases your risk for illness A healthy diet, regular physical exercise & weight monitoring are important for maintaining a healthy lifestyle You may be retaining fluid if you have a history of heart failure or if you experience any of the following symptoms:  Weight gain of 3 pounds or more overnight or 5 pounds in a week, increased swelling in our hands or feet or shortness of breath while lying flat in bed. Please call your doctor as soon as you notice any of these symptoms; do not wait until your next office visit. Recognize signs and symptoms of STROKE: 
 
F-face looks uneven A-arms unable to move or move unevenly S-speech slurred or non-existent T-time-call 911 as soon as signs and symptoms begin-DO NOT go Back to bed or wait to see if you get better-TIME IS BRAIN. Warning Signs of HEART ATTACK Call 911 if you have these symptoms: 
? Chest discomfort. Most heart attacks involve discomfort in the center of the chest that lasts more than a few minutes, or that goes away and comes back. It can feel like uncomfortable pressure, squeezing, fullness, or pain. ? Discomfort in other areas of the upper body. Symptoms can include pain or discomfort in one or both arms, the back, neck, jaw, or stomach. ? Shortness of breath with or without chest discomfort. ? Other signs may include breaking out in a cold sweat, nausea, or lightheadedness. Don't wait more than five minutes to call [address]! Fast action can save your life. Calling 911 is almost always the fastest way to get lifesaving treatment. Emergency Medical Services staff can begin treatment when they arrive  up to an hour sooner than if someone gets to the hospital by car. The discharge information has been reviewed with the patient. The patient verbalized understanding.  
 
Discharge medications reviewed with the patient and appropriate educational materials and side effects teaching were provided. Patient armband removed and given to patient to take home. Patient was informed of the privacy risks if armband lost or stolen Discharge Orders None Introducing [facility]! Dear Alberto Jenkins: 
Thank you for requesting a [employer] account. Our records indicate that you have previously registered for a [employer] account but its currently inactive. Please call our [employer] support line at [phone]. Additional Information If you have questions, please visit the Frequently Asked Questions section of the [employer] website at [email or web address] Remember, [employer] is NOT to be used for urgent needs. For medical emergencies, dial 911. Now available from your iPhone and Android! General Information Please provide this summary of care documentation to your next provider. Patient Signature:  ____________________________________________________________ Date:  ____________________________________________________________  
  
Brandi Artist Provider Signature:  ____________________________________________________________ Date:  ____________________________________________________________

## 2017-02-14 NOTE — ANESTHESIA POSTPROCEDURE EVALUATION
Post-Anesthesia Evaluation and Assessment    Patient: Lin Sandhoff MRN: [id number]  SSN: [id number]    YOB: 1945  Age: 66 y.o. Sex: female       Cardiovascular Function/Vital Signs  Visit Vitals    /47    Pulse 76    Temp 36.6 °C (97.9 °F)    Resp 9    Ht 4' 8\" (1.422 m)    Wt 51.7 kg (114 lb)    SpO2 (!) 67%    BMI 25.56 kg/m2       Patient is status post general anesthesia for Procedure(s):  left arm ARTERIO VENOUS FISTULA. Nausea/Vomiting: None    Postoperative hydration reviewed and adequate. Pain:  Pain Scale 1: Numeric (0 - 10) (02/14/17 1131)  Pain Intensity 1: 0 (02/14/17 1131)   Managed    Neurological Status:   Neuro (WDL): Within Defined Limits (02/14/17 1140)   At baseline    Mental Status and Level of Consciousness: Alert and oriented     Pulmonary Status:   O2 Device: Oxygen mask (02/14/17 6263)   Adequate oxygenation and airway patent    Complications related to anesthesia: None    Post-anesthesia assessment completed.  No concerns    Signed By: Radha Wise MD     February 14, 2017

## 2017-02-14 NOTE — DISCHARGE INSTRUCTIONS
Hemodialysis Access: What to Expect at [address]  Hemodialysis is a way to remove wastes from the blood when your kidneys can no longer do the job. It is not a cure, but it can help you live longer and feel better. It is a lifesaving treatment when you have kidney failure. Hemodialysis is often called dialysis. Your doctor created a place (called an access) in your arm for your blood to flow in and out of your body during your dialysis sessions. Your arm will probably be bruised and swollen. It may hurt. The cut (incision) may bleed. The pain and bleeding will get better over several days. You will probably need only over-the-counter pain medicine. You can reduce swelling by propping your arm on 1 or 2 pillows and keeping your elbow straight. You will have stitches. These may dissolve on their own, or your doctor will tell you when to come in to have them removed. You should also be able to return to work in a few days. You may feel some coolness or numbness in your hand. These feelings usually go away in a few weeks. Your doctor may suggest squeezing a soft object. This will strengthen your access and may make hemodialysis faster and easier. You should always be able to feel blood rushing through the fistula or graft. It feels like a slight vibration when you put your fingers on the skin over the fistula or graft. This feeling is called a thrill or pulse. This care sheet gives you a general idea about how long it will take for you to recover. But each person recovers at a different pace. Follow the steps below to get better as quickly as possible. How can you care for yourself at home? Activity  · Rest when you feel tired. Getting enough sleep will help you recover. Do not lie on or sleep on the arm with the access. · Avoid activities such as washing windows or gardening that put stress on the arm with the access.   · You may use your arm, but do not lift anything that weighs more than about 15 pounds. This may include a child, heavy grocery bags, a heavy briefcase or backpack, cat litter or dog food bags, or a vacuum . · You can shower, but keep the access dry for the first 2 days. Cover the area with a plastic bag to keep it dry. · Do not soak or scrub the incision until it has healed. · Wear an arm guard to protect the area if you play sports or work with your arms. · You may drive when your doctor says it is okay. This is usually in 1 to 2 days. · Most people are able to return to work about 1 or 2 days after surgery. Diet  · Follow an eating plan that is good for your kidneys. A registered dietitian can help you make a meal plan that is right for you. You may need to limit protein, salt, fluids, and certain foods. Medicines  · Your doctor will tell you if and when you can restart your medicines. He or she will also give you instructions about taking any new medicines. · If you take blood thinners, such as warfarin (Coumadin), clopidogrel (Plavix), or aspirin, be sure to talk to your doctor. He or she will tell you if and when to start taking those medicines again. Make sure that you understand exactly what your doctor wants you to do. · Take pain medicines exactly as directed. ¨ If the doctor gave you a prescription medicine for pain, take it as prescribed. ¨ If you are not taking a prescription pain medicine, ask your doctor if you can take acetaminophen (Tylenol). Do not take ibuprofen (Advil, Motrin) or naproxen (Aleve), or similar medicines, unless your doctor tells you to. They may make chronic kidney disease worse. ¨ Do not take two or more pain medicines at the same time unless the doctor told you to. Many pain medicines have acetaminophen, which is Tylenol. Too much acetaminophen (Tylenol) can be harmful. · If you think your pain medicine is making you sick to your stomach:  ¨ Take your medicine after meals (unless your doctor has told you not to).   ¨ Ask your doctor for a different pain medicine. · If your doctor prescribed antibiotics, take them as directed. Do not stop taking them just because you feel better. You need to take the full course of antibiotics. Incision care  · Keep the area dry for 2 days. After 2 days, wash the area with an antibacterial soap every day, and always before dialysis. · Do not soak or scrub the incision until it has healed. · If you have a bandage, change it every day or as your doctor recommends. Your doctor will tell you when you can remove it. Exercise  · Squeeze a soft ball or other object as your doctor tells you. This will help blood flow through the access and help prevent blood clots. Elevation  · Prop up the sore arm on a pillow anytime you sit or lie down during the next 3 days. Try to keep it above the level of your heart. This will help reduce swelling. Other instructions  · Every day, check your access for a pulse or thrill in the fistula or graft area. A thrill is a vibration. To feel a pulse or thrill, place the first two fingers of your hand over the access. · Do not bump your arm. · Do not wear tight clothing, jewelry, or anything else that may squeeze the access. · Use your other arm to have blood drawn or blood pressure taken. · Do not put cream or lotion on or near the access. · Make sure all doctors you deal with know you have a vascular access. Follow-up care is a key part of your treatment and safety. Be sure to make and go to all appointments, and call your doctor if you are having problems. It's also a good idea to know your test results and keep a list of the medicines you take. When should you call for help? Call 911 anytime you think you may need emergency care. For example, call if:  · You passed out (lost consciousness). · You have severe trouble breathing. · You have sudden chest pain and shortness of breath, or you cough up blood. · You have a rapid pulse or heartbeat.   Call your doctor now or seek immediate medical care if:  · Your hand or arm is cold or dark-colored. · You have sudden bulging around your access. · You have no pulse or thrill in your access, or you hear no sound of blood in your access. · Bleeding after dialysis lasts longer than usual.  · You have severe pain that does not get better after you take pain medicine. · You have a fever over 100°F.  · You have loose stitches, or your incision comes open. · You are bleeding from the incision more than you had been. · You have signs of infection, such as:  ¨ Increased pain, swelling, warmth, or redness. ¨ Red streaks leading from the incision. ¨ Pus draining from the incision. ¨ Swollen lymph nodes in your neck, armpits, or groin. ¨ A fever. Watch closely for changes in your health, and be sure to contact your doctor if you have any problems. Where can you learn more? Go to http://mihai-zach.info/. Enter P616 in the search box to learn more about \"Hemodialysis Access: What to Expect at Home. \"  Current as of: November 20, 2015  Content Version: 11.1  © 7905-7687 [employer]. Care instructions adapted under license by [employer] (which disclaims liability or warranty for this information). If you have questions about a medical condition or this instruction, always ask your healthcare professional. [address] any warranty or liability for your use of this information. DISCHARGE SUMMARY from Nurse    The following personal items are in your possession at time of discharge:    Dental Appliances: None  Visual Aid:  At bedside     Home Medications: None  Jewelry: None  Clothing: Pants, Shirt, Jacket/Coat, Undergarments, Footwear  Other Valuables: Eyeglasses             PATIENT INSTRUCTIONS:    After general anesthesia or intravenous sedation, for 24 hours or while taking prescription Narcotics:  · Limit your activities  · Do not drive and operate hazardous machinery  · Do not make important personal or business decisions  · Do  not drink alcoholic beverages  · If you have not urinated within 8 hours after discharge, please contact your surgeon on call. Report the following to your surgeon:  · Excessive pain, swelling, redness or odor of or around the surgical area  · Temperature over 100.5  · Nausea and vomiting lasting longer than 4 hours or if unable to take medications  · Any signs of decreased circulation or nerve impairment to extremity: change in color, persistent  numbness, tingling, coldness or increase pain  · Any questions        What to do at Home:  These are general instructions for a healthy lifestyle:    No smoking/ No tobacco products/ Avoid exposure to second hand smoke    Surgeon General's Warning:  Quitting smoking now greatly reduces serious risk to your health. Obesity, smoking, and sedentary lifestyle greatly increases your risk for illness    A healthy diet, regular physical exercise & weight monitoring are important for maintaining a healthy lifestyle    You may be retaining fluid if you have a history of heart failure or if you experience any of the following symptoms:  Weight gain of 3 pounds or more overnight or 5 pounds in a week, increased swelling in our hands or feet or shortness of breath while lying flat in bed. Please call your doctor as soon as you notice any of these symptoms; do not wait until your next office visit. Recognize signs and symptoms of STROKE:    F-face looks uneven    A-arms unable to move or move unevenly    S-speech slurred or non-existent    T-time-call 911 as soon as signs and symptoms begin-DO NOT go       Back to bed or wait to see if you get better-TIME IS BRAIN. Warning Signs of HEART ATTACK     Call 911 if you have these symptoms:   Chest discomfort. Most heart attacks involve discomfort in the center of the chest that lasts more than a few minutes, or that goes away and comes back.  It can feel like uncomfortable pressure, squeezing, fullness, or pain.  Discomfort in other areas of the upper body. Symptoms can include pain or discomfort in one or both arms, the back, neck, jaw, or stomach.  Shortness of breath with or without chest discomfort.  Other signs may include breaking out in a cold sweat, nausea, or lightheadedness. Don't wait more than five minutes to call 911 - MINUTES MATTER! Fast action can save your life. Calling 911 is almost always the fastest way to get lifesaving treatment. Emergency Medical Services staff can begin treatment when they arrive -- up to an hour sooner than if someone gets to the hospital by car. The discharge information has been reviewed with the patient. The patient verbalized understanding. Discharge medications reviewed with the patient and appropriate educational materials and side effects teaching were provided. Patient armband removed and given to patient to take home.   Patient was informed of the privacy risks if armband lost or stolen

## 2017-02-14 NOTE — H&P
Date of Surgery Update:  Maryann Rocha was seen and examined. History and physical has been reviewed. The patient has been examined.  There have been no significant clinical changes since the completion of the originally dated History and Physical.    Signed By: Nadia Amos MD     February 14, 2017 12:32 PM

## 2017-02-14 NOTE — OP NOTES
[facility]  OP NOTE    2/14/2017    Hospital:  [facility]  Surgeon(s):  Uziel Gabriel MD  Assistant(s): Randy Seo PA-C  Pre-operative Diagnosis: n18.6  end stage renal disease  Post-operative Diagnosis: n18.6  end stage renal disease  Procedure(s) Performed: Procedure(s):  left arm ARTERIO VENOUS FISTULA  Anesthesia:  general  Findings:  None  Complications: None  Estimated Blood Loss:  Minimal <100  Tubes and Drains:  None  Specimens: * No specimens in log *    The patient was placed in the supine position. The left arm was prepped with chlorhexidine and draped in a sterile manner. A skin incision in the upper arm was performed, underlying soft tissue divided and the cephalic vein was exposed. The cephalic vein was ligated and divided at the antecubital level, distended with heparin-saline, the vein orientation marked, and occluded with a bulldog clamp. The brachial artery was exposed at the antecubital level. IV heparin  was administered at a dose of  3 thousand units. The brachial artery was clamped, opened with a #11 blade and the arterial-venous anastomosis performed in an end-to-side  manner utilizing 6-0 Prolene. The clamps were then removed in a manner to prevent debris or air embolism. A pulse over the newly created AV fistula was present. A Doppler signal was present in the runoff vein. The radial artery Doppler signal was present and the pulse was palpable. The heparin was not reversed. Suture line bleeding was controlled by digital pressure. The tissues were approximated with 3-0 Vicryl; the skin was closed with 4-0 Monocryl. The skin was dressed with Dermabond. The patient tolerated the procedure well without any complications.     Uziel Gabriel MD , FACS     2/14/2017

## 2017-02-14 NOTE — ANESTHESIA PREPROCEDURE EVALUATION
Anesthetic History     PONV          Review of Systems / Medical History  Patient summary reviewed and pertinent labs reviewed    Pulmonary    COPD: severe    Sleep apnea    Asthma : poorly controlled       Neuro/Psych              Cardiovascular    Hypertension                   GI/Hepatic/Renal         Renal disease: dialysis and ESRD       Endo/Other    Diabetes: well controlled, type 2  Hypothyroidism: well controlled       Other Findings   Comments: Current Smoker? NO       Elective Surgery? Yes       Abstained from smoking 24 hours prior to anesthesia? N/A    Risk Factors for Postoperative nausea/vomiting:       History of postoperative nausea/vomiting? NO       Female? YES       Motion sickness? NO       Intended opioid administration for postoperative analgesia?   YES           Physical Exam    Airway  Mallampati: III  TM Distance: 4 - 6 cm  Neck ROM: decreased range of motion   Mouth opening: Diminished (comment)     Cardiovascular    Rhythm: regular  Rate: normal         Dental    Dentition: Poor dentition     Pulmonary  Breath sounds clear to auscultation               Abdominal  GI exam deferred       Other Findings            Anesthetic Plan    ASA: 4  Anesthesia type: general          Induction: Intravenous  Anesthetic plan and risks discussed with: Patient

## 2017-02-21 ENCOUNTER — TELEPHONE (OUTPATIENT)
Dept: PRIMARY CARE CLINIC | Age: 72
End: 2017-02-21

## 2017-02-21 NOTE — TELEPHONE ENCOUNTER
Gastro calling because dr Mary Mays is seeing ms Dowling Earaleksandr this afternoon at 3:30 for elevated liver enzymes.  They are needing a referral. Phone is [phone] and fax is [phone]

## 2017-03-01 ENCOUNTER — TELEPHONE (OUTPATIENT)
Dept: FAMILY MEDICINE CLINIC | Age: 72
End: 2017-03-01

## 2017-03-01 NOTE — TELEPHONE ENCOUNTER
Human Auth#: [id number] received, and faxed to [address] [place] [address] with confirmation received.     Certification Number: [id number]  Status: Certified in [facility]  NPI: [id number]  Tax ID: [id number]  Carrier Assigned Reference Number: [id number]  Address: [address] [place] [address]: [place]  ZIP Code: [phone]    Anastasiia Ponce

## 2017-03-01 NOTE — TELEPHONE ENCOUNTER
Patients  request a referral for his wifes Vicenta wiggins on March 10th @1pm.Patients appointment is with Dr London Jenkins contact patient if you have any questions at [phone]

## 2017-03-02 ENCOUNTER — OFFICE VISIT (OUTPATIENT)
Dept: FAMILY MEDICINE CLINIC | Age: 72
End: 2017-03-02

## 2017-03-02 VITALS
WEIGHT: 114 LBS | DIASTOLIC BLOOD PRESSURE: 69 MMHG | BODY MASS INDEX: 25.64 KG/M2 | HEIGHT: 56 IN | SYSTOLIC BLOOD PRESSURE: 161 MMHG | TEMPERATURE: 97 F | RESPIRATION RATE: 20 BRPM | HEART RATE: 71 BPM | OXYGEN SATURATION: 98 %

## 2017-03-02 DIAGNOSIS — M76.62 TENDONITIS, ACHILLES, LEFT: Primary | ICD-10-CM

## 2017-03-02 RX ORDER — DICLOFENAC SODIUM 10 MG/G
GEL TOPICAL
Qty: 2 G | Refills: 0 | Status: SHIPPED | OUTPATIENT
Start: 2017-03-02 | End: 2017-07-14

## 2017-03-02 NOTE — PROGRESS NOTES
Pt here today for severe heel pain left>right x 2 weeks    1. Have you been to the ER, urgent care clinic since your last visit? Hospitalized since your last visit? No    2. Have you seen or consulted any other health care providers outside of the [address] [place] [address] since your last visit? Include any pap smears or colon screening.  No

## 2017-03-02 NOTE — MR AVS SNAPSHOT
Visit Information Date & Time Provider Department Dept. Phone Encounter #  
 3/2/2017  1:45 PM Armond TorresTaisha7 [facility] [phone] [id number] Follow-up Instructions Return if symptoms worsen or fail to improve. Your Appointments 3/22/2017 10:00 AM  
Office Visit with MD Benita Hendricks ([facility]) Appt Note: [address]  
  
   
 [address] [facility]  
  
    
 5/2/2017 10:00 AM  
Follow Up with MD Benita Hendricks ([facility]) Appt Note: 3 mo f/u  
 [facility] [address]  
  
   
 [address] Upcoming Health Maintenance Date Due ZOSTER VACCINE AGE 60> 12/6/2003 FOOT EXAM Q1 6/1/2015 EYE EXAM RETINAL OR DILATED Q1 9/28/2016 MEDICARE YEARLY EXAM 3/22/2017 HEMOGLOBIN A1C Q6M 7/31/2017 LIPID PANEL Q1 9/20/2017 GLAUCOMA SCREENING Q2Y 9/28/2017 MICROALBUMIN Q1 1/31/2018 COLONOSCOPY 6/14/2018 BREAST CANCER SCRN MAMMOGRAM 7/15/2018 DTaP/Tdap/Td series (2 - Td) 12/1/2026 Allergies as of 3/2/2017  Review Complete On: 3/2/2017 By: JADE Guevara Severity Noted Reaction Type Reactions Ace Inhibitors  08/04/2015    Cough Aspirin  01/09/2013    Other (comments) Hallucinations Hydralazine  08/04/2015    Other (comments) Hallucinations, dizziness and numbness in legs Current Immunizations  Reviewed on 3/2/2017 Name Date Influenza Vaccine 9/10/2016, 10/1/2009 Pneumococcal Conjugate (PCV-13) 8/14/2016 Pneumococcal Vaccine (Unspecified Type) 4/8/2010 Tdap 12/1/2016 Reviewed by JADE Guevara on 3/2/2017 at  2:13 PM  
You Were Diagnosed With   
  
 Codes Comments Tendonitis, Achilles, left    -  Primary ICD-10-CM: M76.62 
ICD-9-CM: 726.71 Vitals BP  
  
  
  
  
  
 161/69 (BP 1 Location: Left arm, BP Patient Position: Sitting) Vitals History BMI and BSA Data Body Mass Index Body Surface Area 25.56 kg/m 2 1.43 m 2 Preferred Pharmacy Pharmacy Name Phone F[id number] - [place], [place] - [address] [facility] [phone] Your Updated Medication List  
  
   
This list is accurate as of: 3/2/17  2:31 PM.  Always use your most recent med list.  
  
  
  
  
 atorvastatin 40 mg tablet Commonly known as:  LIPITOR Take 20 mg by mouth daily. HumaLOG 100 unit/mL injection Generic drug:  insulin lispro  
by SubCUTAneous route. Sliding scale LANTUS 100 unit/mL injection Generic drug:  insulin glargine 2 Units by SubCUTAneous route nightly. levothyroxine 75 mcg tablet Commonly known as:  SYNTHROID Take  by mouth Daily (before breakfast). losartan 100 mg tablet Commonly known as:  COZAAR Take 1 Tab by mouth daily. metoprolol tartrate 50 mg tablet Commonly known as:  LOPRESSOR Take  by mouth daily. oxyCODONE-acetaminophen 5-325 mg per tablet Commonly known as:  PERCOCET Take 1 Tab by mouth every four (4) hours as needed for Pain. Max Daily Amount: 6 Tabs. OXYGEN-AIR DELIVERY SYSTEMS Take 3 L by inhalation continuous. pantoprazole 40 mg tablet Commonly known as:  PROTONIX Take 1 Tab by mouth every twelve (12) hours. FABBY-DARLIN 0.8 mg Tab tablet Generic drug:  b complex-vitamin c-folic acid 0.8 mg Take 0.8 mg by mouth daily. SYMBICORT 160-4.5 mcg/actuation HFA inhaler Generic drug:  budesonide-formoterol Take 2 Puffs by inhalation two (2) times a day. Follow-up Instructions Return if symptoms worsen or fail to improve. Patient Instructions Achilles Tendon: Exercises Your Care Instructions Here are some examples of exercises for your achilles tendon. Start each exercise slowly. Ease off the exercise if you start to have pain. Your doctor or physical therapist will tell you when you can start these exercises and which ones will work best for you. How to do the exercises Toe stretch 1. Sit in a chair, and extend your affected leg so that your heel is on the floor. 2. With your hand, reach down and pull your big toe up and back. Pull toward your ankle and away from the floor. 3. Hold the position for at least 15 to 30 seconds. 4. Repeat 2 to 4 times a session, several times a day. Calf-plantar fascia stretch 1. Sit with your legs extended and knees straight. 2. Place a towel around your foot just under the toes. 3. Hold each end of the towel in each hand, with your hands above your knees. 4. Pull back with the towel so that your foot stretches toward you. 5. Hold the position for at least 15 to 30 seconds. 6. Repeat 2 to 4 times a session, up to 5 sessions a day. Floor stretch 1. Stand about 2 feet from a wall, and place your hands on the wall at about shoulder height. Or you can stand behind a chair, placing your hands on the back of it for balance. 2. Step back with the leg you want to stretch. Keep the leg straight, and press your heel into the floor with your toe turned slightly in. 
3. Lean forward, and bend your other leg slightly. Feel the stretch in the Achilles tendon of your back leg. Hold for at least 15 to 30 seconds. 4. Repeat 2 to 4 times a session, up to 5 sessions a day. Stair stretch 1. Stand with the balls of both feet on the edge of a step or curb (or a medium-sized phone book). With at least one hand, hold onto something solid for balance, such as a banister or handrail.  
2. Keeping your affected leg straight, slowly let that heel hang down off of the step or curb until you feel a stretch in the back of your calf and/or Achilles area. Some of your weight should still be on the other leg. 3. Hold this position for at least 15 to 30 seconds. 4. Repeat 2 to 4 times a session, up to 5 times a day or whenever your Achilles tendon starts to feel tight. This stretch can also be done with your knee slightly bent. Strength exercise This exercise will get you started on building strength after an Achilles tendon injury. Your doctor or physical therapist can help you move on to more challenging exercises as you heal and get stronger. 1. Stand on a step with your heel off the edge of the step. Hold on to a handrail or wall for balance. 2. Push up on your toes, then slowly count to 10 as you lower yourself back down until your heel is below the step. If it hurts to push up on your toes, try putting most of your weight on your other foot as you push up, or try using your arms to help you. If you can't do this exercise without causing pain, stop the exercise and talk to your doctor. 3. Repeat the exercise 8 to 12 times, half with the knee straight and half with the knee bent. Follow-up care is a key part of your treatment and safety. Be sure to make and go to all appointments, and call your doctor if you are having problems. It's also a good idea to know your test results and keep a list of the medicines you take. Where can you learn more? Go to http://mihai-zach.info/. Enter I418 in the search box to learn more about \"Achilles Tendon: Exercises. \" Current as of: May 23, 2016 Content Version: 11.1 © 4855-0923 [employer]. Care instructions adapted under license by [employer] (which disclaims liability or warranty for this information). If you have questions about a medical condition or this instruction, always ask your healthcare professional. [address] any warranty or liability for your use of this information. Introducing [facility]! Dear Alan Spicer: 
Thank you for requesting a Notch account. Our records indicate that you have previously registered for a Notch account but its currently inactive. Please call our Notch support line at [phone]. Additional Information If you have questions, please visit the Frequently Asked Questions section of the Notch website at [email or web address] Remember, Notch is NOT to be used for urgent needs. For medical emergencies, dial 911. Now available from your iPhone and Android! Please provide this summary of care documentation to your next provider. Your primary care clinician is listed as Lia Jacinto. If you have any questions after today's visit, please call [phone].

## 2017-03-02 NOTE — PROGRESS NOTES
Chief Complaint   Patient presents with    Foot Pain     heel pain x 2 weeks left >right       HPI:    This is a 65 y/o female patient who comes in today with the above complaints. Denies trauma. States mild pain on the the back of the left heel when walking. Currently uses a wheelchair. Denies fever, chills. States she is able to move left foot downward with mild pain. Denies leg swelling or pain. ESRD, currently on dialysis. BP slightly high. ROS:    Negative other than that mentioned in HPI. Physical Exam:    Vital Signs:   Visit Vitals    /69 (BP 1 Location: Left arm, BP Patient Position: Sitting)    Pulse 71    Temp 97 °F (36.1 °C) (Oral)    Resp 20    Ht 4' 8\" (1.422 m)    Wt 114 lb (51.7 kg)    SpO2 98%  Comment: room air    BMI 25.56 kg/m2         General: a, a & o x 3, afebrile, well-nourished, interacting appropriately, in no acute distress  Skin: warm and dry, no rashes , no erythema, no hyperthermia,  no bruises, no skin lesions  Respiratory: lung sounds clear bilaterally,  good respiratory effort, no wheezes or crackles  Cardiovascular: regular rate and rhythm, no murmurs, capillary refills < 2 sec, pulses palpable, trace leg edema  Musculoskeletal: palpable tenderness on left achilles tendon, plantar flexion-induced pain noted, dorsiflexion normal, ROM on all joints intact, no foot swelling, no hyperthermia, no erythema,  uses wheelchair for ambulatory assistance    Assessment/Plan:    1. Tendonitis, Achilles, left    - wrapped affected foot andlower leg with kerlix bandage and ace bandage.   - weight bearing on affected foot as tolerated  - ESRD, on dialysis, not able to take oral NSAIDS  - achilles tendon exercise discussed. Printed copy provided. - instructed to call for worsening of symptoms   - diclofenac (VOLTAREN) 1 % gel; Apply  to affected area every eight (8) hours as needed.   Dispense: 2 g; Refill: 0      Additional Notes: Discussed today's diagnosis, treatment plans. Discussed medication indications and side effects. After Visit Summary: Provided and discussed printed patient instructions. Answered questions. Follow-up Disposition:  Return if symptoms worsen or fail to improve. DALE Lawrence-BC  Adult Medicine  [facility]

## 2017-03-02 NOTE — PATIENT INSTRUCTIONS
Achilles Tendon: Exercises  Your Care Instructions  Here are some examples of exercises for your achilles tendon. Start each exercise slowly. Ease off the exercise if you start to have pain. Your doctor or physical therapist will tell you when you can start these exercises and which ones will work best for you. How to do the exercises  Toe stretch    1. Sit in a chair, and extend your affected leg so that your heel is on the floor. 2. With your hand, reach down and pull your big toe up and back. Pull toward your ankle and away from the floor. 3. Hold the position for at least 15 to 30 seconds. 4. Repeat 2 to 4 times a session, several times a day. Calf-plantar fascia stretch    1. Sit with your legs extended and knees straight. 2. Place a towel around your foot just under the toes. 3. Hold each end of the towel in each hand, with your hands above your knees. 4. Pull back with the towel so that your foot stretches toward you. 5. Hold the position for at least 15 to 30 seconds. 6. Repeat 2 to 4 times a session, up to 5 sessions a day. Floor stretch    1. Stand about 2 feet from a wall, and place your hands on the wall at about shoulder height. Or you can stand behind a chair, placing your hands on the back of it for balance. 2. Step back with the leg you want to stretch. Keep the leg straight, and press your heel into the floor with your toe turned slightly in.  3. Lean forward, and bend your other leg slightly. Feel the stretch in the Achilles tendon of your back leg. Hold for at least 15 to 30 seconds. 4. Repeat 2 to 4 times a session, up to 5 sessions a day. Stair stretch    1. Stand with the balls of both feet on the edge of a step or curb (or a medium-sized phone book). With at least one hand, hold onto something solid for balance, such as a banister or handrail.   2. Keeping your affected leg straight, slowly let that heel hang down off of the step or curb until you feel a stretch in the back of your calf and/or Achilles area. Some of your weight should still be on the other leg. 3. Hold this position for at least 15 to 30 seconds. 4. Repeat 2 to 4 times a session, up to 5 times a day or whenever your Achilles tendon starts to feel tight. This stretch can also be done with your knee slightly bent. Strength exercise    This exercise will get you started on building strength after an Achilles tendon injury. Your doctor or physical therapist can help you move on to more challenging exercises as you heal and get stronger. 1. Stand on a step with your heel off the edge of the step. Hold on to a handrail or wall for balance. 2. Push up on your toes, then slowly count to 10 as you lower yourself back down until your heel is below the step. If it hurts to push up on your toes, try putting most of your weight on your other foot as you push up, or try using your arms to help you. If you can't do this exercise without causing pain, stop the exercise and talk to your doctor. 3. Repeat the exercise 8 to 12 times, half with the knee straight and half with the knee bent. Follow-up care is a key part of your treatment and safety. Be sure to make and go to all appointments, and call your doctor if you are having problems. It's also a good idea to know your test results and keep a list of the medicines you take. Where can you learn more? Go to http://mihai-zach.info/. Enter S105 in the search box to learn more about \"Achilles Tendon: Exercises. \"  Current as of: May 23, 2016  Content Version: 11.1  © 3285-9760 [employer]. Care instructions adapted under license by [employer] (which disclaims liability or warranty for this information). If you have questions about a medical condition or this instruction, always ask your healthcare professional. [address] any warranty or liability for your use of this information.

## 2017-03-13 DIAGNOSIS — E11.65 TYPE 2 DIABETES MELLITUS WITH HYPERGLYCEMIA, WITH LONG-TERM CURRENT USE OF INSULIN (HCC): Primary | ICD-10-CM

## 2017-03-13 DIAGNOSIS — Z79.4 TYPE 2 DIABETES MELLITUS WITH HYPERGLYCEMIA, WITH LONG-TERM CURRENT USE OF INSULIN (HCC): Primary | ICD-10-CM

## 2017-03-22 ENCOUNTER — OFFICE VISIT (OUTPATIENT)
Dept: FAMILY MEDICINE CLINIC | Age: 72
End: 2017-03-22

## 2017-03-22 VITALS
HEIGHT: 56 IN | BODY MASS INDEX: 26.32 KG/M2 | TEMPERATURE: 95.6 F | DIASTOLIC BLOOD PRESSURE: 81 MMHG | SYSTOLIC BLOOD PRESSURE: 185 MMHG | WEIGHT: 117 LBS | HEART RATE: 74 BPM | RESPIRATION RATE: 17 BRPM

## 2017-03-22 DIAGNOSIS — Z13.39 SCREENING FOR ALCOHOLISM: ICD-10-CM

## 2017-03-22 DIAGNOSIS — H26.9 CATARACT: ICD-10-CM

## 2017-03-22 DIAGNOSIS — Z71.89 ADVANCE DIRECTIVE DISCUSSED WITH PATIENT: ICD-10-CM

## 2017-03-22 DIAGNOSIS — I10 ESSENTIAL HYPERTENSION: Chronic | ICD-10-CM

## 2017-03-22 DIAGNOSIS — M25.472 LEFT ANKLE EFFUSION: ICD-10-CM

## 2017-03-22 DIAGNOSIS — E03.4 HYPOTHYROIDISM DUE TO ACQUIRED ATROPHY OF THYROID: Chronic | ICD-10-CM

## 2017-03-22 DIAGNOSIS — E78.5 HYPERLIPIDEMIA, UNSPECIFIED HYPERLIPIDEMIA TYPE: Chronic | ICD-10-CM

## 2017-03-22 DIAGNOSIS — I73.9 PVD (PERIPHERAL VASCULAR DISEASE) (HCC): ICD-10-CM

## 2017-03-22 DIAGNOSIS — Z00.00 ROUTINE GENERAL MEDICAL EXAMINATION AT A HEALTH CARE FACILITY: Primary | ICD-10-CM

## 2017-03-22 RX ORDER — SEVELAMER CARBONATE 800 MG/1
800 TABLET, FILM COATED ORAL 3 TIMES DAILY
Refills: 11 | COMMUNITY
Start: 2017-03-09 | End: 2019-12-11

## 2017-03-22 NOTE — PROGRESS NOTES
Chief Complaint   Patient presents with    ED Follow-up     *Foot    Annual Wellness Visit       Pt is a 66y.o. year old female who presents for follow up of her chronic medical problems    Seen at ER recently for left foot pain-advised to see Ortho for arthritis; placed a boot on her and given pain meds  ANKLE COMPLETE LT3/20/2017  [facility]  Result Impression   Impression:  1.  No acute osseous abnormality. 2.  Suspect large joint effusion.      Hx of gout but this does not look like it per ; usually on her right foot/no attacks in a while  Started to have pain on the left ankle for over 2 weeks (saw NP and was given topical and wrapped in ace bandage)  No trauma, walks around the house but on the wheelchair when she is out    Denies polyuria, polydipsia and polyphagia  Last A1c-  Lab Results   Component Value Date/Time    Hemoglobin A1c 5.2 01/31/2017 11:56 AM    Hemoglobin A1c (POC) 6.3 07/15/2014 11:20 AM    Hemoglobin A1c, External 5.5 09/19/2016   Meds:on Lantus 2-4 units once a day as needed    BP Readings from Last 3 Encounters:   03/22/17 185/81   03/02/17 161/69   02/14/17 156/82   Repeat BP-still high  Has not taken BP meds yet because she just had dialysis  Normal at home per      Having dialysis 3x  A week    Lab Results   Component Value Date/Time    TSH 0.64 10/05/2016 08:40 AM   On Synthroid    Lab Results   Component Value Date/Time    Cholesterol, total 164 04/16/2013 12:00 AM    HDL Cholesterol 88 04/16/2013 12:00 AM    LDL, calculated 64 04/16/2013 12:00 AM    VLDL, calculated 11 09/29/2010 02:20 PM    Triglyceride 141 10/09/2016 03:30 AM    CHOL/HDL Ratio 2.3 09/29/2010 02:20 PM   Fasting?had a banana  On Lipitor    On Protonix-does not know why-started during hsopitalixzation (was given horse pill there per )    On Symbicort -apparently has not needed it; has appt with Dr. Kelsey Cox    ROS:    Pt denies: Wt loss, Fever/Chills, HA, Visual changes, Fatigue, Chest pain, SOB, LEBLANC, Abd pain, N/V/D/C, Blood in stool or urine, Edema. Pertinent positive as above in HPI. All others were negative    Patient Active Problem List   Diagnosis Code    Gout M10.9    Hyperlipidemia E78.5    CKD (chronic kidney disease) N18.9    Hypothyroidism E03.9    Tubular adenoma of colon D12.6    Anemia in chronic kidney disease N18.9, D63.1    Chronic obstructive pulmonary disease ([facility]) J44.9    MELANIE on CPAP G47.33    Acute blood loss anemia D62    Elevated LFTs R79.89    Hyperphosphatemia E83.39    CHF (congestive heart failure) ([facility]) I50.9    Thrombocytopenia due to blood loss D69.59    Type 2 diabetes mellitus with hyperglycemia, with long-term current use of insulin ([facility]) E11.65, Z79.4    Advance directive discussed with patient Z70.80    Essential hypertension I10    Hypothyroidism due to acquired atrophy of thyroid E03.4       Past Medical History:   Diagnosis Date    Anemia in CKD (chronic kidney disease)     Aortic stenosis     Asthma     Calculus of kidney     Cancer ([facility]     right kidney    Cardiomyopathy ([facility]     EF 20-25% (10/16), 55% (06/16)    Chronic kidney disease, stage V ([facility]     Diabetes ([facility]     Dialysis patient ([facility]     Gout     Hypercholesterolemia     Hypertension     Primary hyperparathyroidism ([facility] 2010    s/p parathyroid adenoma excision    Secondary hyperparathyroidism (of renal origin)     Sleep apnea     C PAP    Status post nephrectomy     right    Thyroid disease        Current Outpatient Prescriptions   Medication Sig Dispense Refill    RENVELA 800 mg tab tab Take 800 mg by mouth three (3) times daily. Patient reports medication is taken five times daily with meals and snacks  11    oxyCODONE-acetaminophen (PERCOCET) 5-325 mg per tablet Take 1 Tab by mouth every four (4) hours as needed for Pain. Max Daily Amount: 6 Tabs. 20 Tab 0    FABBY-DARLIN 0.8 mg tab tablet Take 0.8 mg by mouth daily.   11    pantoprazole (PROTONIX) 40 mg tablet Take 1 Tab by mouth every twelve (12) hours. 60 Tab 3    losartan (COZAAR) 100 mg tablet Take 1 Tab by mouth daily. 30 Tab 3    levothyroxine (SYNTHROID) 75 mcg tablet Take  by mouth Daily (before breakfast).  OXYGEN-AIR DELIVERY SYSTEMS Take 3 L by inhalation continuous.  atorvastatin (LIPITOR) 40 mg tablet Take 20 mg by mouth daily. 5    metoprolol (LOPRESSOR) 50 mg tablet Take  by mouth daily.  insulin lispro (HUMALOG) 100 unit/mL injection by SubCUTAneous route. Sliding scale       budesonide-formoterol (SYMBICORT) 160-4.5 mcg/actuation HFA inhaler Take 2 Puffs by inhalation two (2) times a day.  insulin glargine (LANTUS) 100 unit/mL injection 2 Units by SubCUTAneous route nightly.  diclofenac (VOLTAREN) 1 % gel Apply  to affected area every eight (8) hours as needed. 2 g 0       History   Smoking Status    Never Smoker   Smokeless Tobacco    Never Used       Allergies   Allergen Reactions    Ace Inhibitors Cough    Aspirin Other (comments)     Hallucinations    Hydralazine Other (comments)     Hallucinations, dizziness and numbness in legs        Patient Labs were reviewed: yes      Patient Past Records were reviewed:  yes        Objective:     Vitals:    03/22/17 0959   BP: 185/81   Pulse: 74   Resp: 17   Temp: 95.6 °F (35.3 °C)   TempSrc: Oral   Weight: 117 lb (53.1 kg)   Height: 4' 8\" (1.422 m)     Body mass index is 26.23 kg/(m^2). Exam:   Appearance: alert, well appearing,  oriented to person, place, and time, acyanotic, in no respiratory distress and well hydrated.   HEENT:  NC/AT, pink conj, anicteric sclerae, bilateral lens opacity  Neck:  No cervical lymphadenopathy, no JVD, no thyromegaly, no carotid bruit  Heart:  RRR without M/R/G, dialysis catherter on the right chest   Lungs:  CTAB, no rhonchi, rales, or wheezes with good air exchange   Abdomen:  Non-tender, pos bowel sounds, no hepatosplenomegaly  Ext:  No C/C/E, shunt on the left forearm, left ankle swollen but not red  Skin: no rash  Neuro: no lateralizing signs, CNs II-XII intact    Diabetic foot exam:     Left: Reflexes 2+     Vibratory sensation normal    Proprioception normal   Sharp/dull discrimination normal    Filament test reduced sensation with micro filament   Pulse DP: 2+ (normal)   Pulse PT: 2+ (normal)   Deformities: None  Right: Reflexes 2+decreased sensation on the right foot; cooler to touch also   Vibratory sensation normal   Proprioception normal   Sharp/dull discrimination diminished   Filament test reduced sensation with micro filament   Pulse DP: 2+ (normal)   Pulse PT: 2+ (normal)   Deformities: None  Assessment/ Plan:   Diagnoses and all orders for this visit:    Essential hypertension-uncontrolled; advised to take BP meds prior to next visit; continue with home monitoring    Hyperlipidemia, unspecified hyperlipidemia type-on Lipitor; declined to have labs done here so was given the order to do it at dialysis  -     LIPID PANEL; Future    Hypothyroidism due to acquired atrophy of thyroid-on Synthroid, check TSh next visit    Cataract  -     REFERRAL TO OPHTHALMOLOGY    Left ankle effusion  -     REFERRAL TO ORTHOPEDICS  -     URIC ACID; Future    PVD (peripheral vascular disease) (HCC)  -     LOWER EXT ART PVR WITH EXERCISE; Future        Follow-up Disposition:  Return in about 3 months (around 6/22/2017) for follow up. I have discussed the diagnosis with the patient and the intended plan as seen in the above orders. The patient has received an After-Visit Summary and questions were answered concerning future plans. Medication Side Effects and Warnings were discussed with patient: yes    Patient verbalized understanding of above instructions.     Lee Jackson MD  Internal Medicine  [facility]    This is a Subsequent Medicare Annual Wellness Visit providing Personalized Prevention Plan Services (PPPS) (Performed 12 months after initial AWV and PPPS )    I have reviewed the patient's medical history in detail and updated the computerized patient record. 4/2015: Tyra Myers    7/2016-mammo normal    Colonoscopy up to date    Immunizations: all done    Health Maintenance Due   Topic Date Due    ZOSTER VACCINE AGE 60>  12/06/2003    FOOT EXAM Q1  06/01/2015-today    EYE EXAM RETINAL OR DILATED Q1  09/28/2016-[facility] it done yesterday [facility] with Dr. Gerri Mejia; will be seeing Dr. Néstor Ordoñez for possible cataract surgery-needs referral    MEDICARE YEARLY EXAM  03/22/2017-today       History     Past Medical History:   Diagnosis Date    Anemia in CKD (chronic kidney disease)     Aortic stenosis     Asthma     Calculus of kidney     Cancer ([address]     right kidney    Cardiomyopathy ([address]     EF 20-25% (10/16), 55% (06/16)    Chronic kidney disease, stage V ([address]     Diabetes ([address]     Dialysis patient ([address]     Gout     Hypercholesterolemia     Hypertension     Primary hyperparathyroidism ([address] 2010    s/p parathyroid adenoma excision    Secondary hyperparathyroidism (of renal origin)     Sleep apnea     C PAP    Status post nephrectomy     right    Thyroid disease       Past Surgical History:   Procedure Laterality Date    COLONOSCOPY  08/28/2015    Repeat 5 years    HX GYN      C-sections x 3    HX HEENT      total thyroidectomy    HX NEPHRECTOMY      right    HX THYROIDECTOMY       Current Outpatient Prescriptions   Medication Sig Dispense Refill    RENVELA 800 mg tab tab Take 800 mg by mouth three (3) times daily. Patient reports medication is taken five times daily with meals and snacks  11    oxyCODONE-acetaminophen (PERCOCET) 5-325 mg per tablet Take 1 Tab by mouth every four (4) hours as needed for Pain. Max Daily Amount: 6 Tabs. 20 Tab 0    FABBY-DARLIN 0.8 mg tab tablet Take 0.8 mg by mouth daily. 11    pantoprazole (PROTONIX) 40 mg tablet Take 1 Tab by mouth every twelve (12) hours.  60 Tab 3    losartan (COZAAR) 100 mg tablet Take 1 Tab by mouth daily. 30 Tab 3    levothyroxine (SYNTHROID) 75 mcg tablet Take  by mouth Daily (before breakfast).  OXYGEN-AIR DELIVERY SYSTEMS Take 3 L by inhalation continuous.  atorvastatin (LIPITOR) 40 mg tablet Take 20 mg by mouth daily. 5    metoprolol (LOPRESSOR) 50 mg tablet Take  by mouth daily.  insulin lispro (HUMALOG) 100 unit/mL injection by SubCUTAneous route. Sliding scale       budesonide-formoterol (SYMBICORT) 160-4.5 mcg/actuation HFA inhaler Take 2 Puffs by inhalation two (2) times a day.  insulin glargine (LANTUS) 100 unit/mL injection 2 Units by SubCUTAneous route nightly.  diclofenac (VOLTAREN) 1 % gel Apply  to affected area every eight (8) hours as needed.  2 g 0     Allergies   Allergen Reactions    Ace Inhibitors Cough    Aspirin Other (comments)     Hallucinations    Hydralazine Other (comments)     Hallucinations, dizziness and numbness in legs      Family History   Problem Relation Age of Onset    Hypertension Mother     Diabetes Mother     No Known Problems Father     No Known Problems Sister     No Known Problems Brother     No Known Problems Other     No Known Problems Brother     No Known Problems Brother     No Known Problems Daughter     No Known Problems Son     No Known Problems Son      Social History   Substance Use Topics    Smoking status: Never Smoker    Smokeless tobacco: Never Used    Alcohol use No     Patient Active Problem List   Diagnosis Code    Gout M10.9    Hyperlipidemia E78.5    CKD (chronic kidney disease) N18.9    Hypothyroidism E03.9    Tubular adenoma of colon D12.6    Anemia in chronic kidney disease N18.9, D63.1    Chronic obstructive pulmonary disease (HCC) J44.9    MELANIE on CPAP G47.33    Acute blood loss anemia D62    Elevated LFTs R79.89    Hyperphosphatemia E83.39    CHF (congestive heart failure) ([facility]) I50.9    Thrombocytopenia due to blood loss D69.59    Type 2 diabetes mellitus with hyperglycemia, with long-term current use of insulin (HCC) E11.65, Z79.4    Advance directive discussed with patient Z70.80    Essential hypertension I10    Hypothyroidism due to acquired atrophy of thyroid E03.4       Depression Risk Factor Screening:     PHQ 2 / 9, over the last two weeks 3/22/2017   Little interest or pleasure in doing things Not at all   Feeling down, depressed or hopeless Not at all   Total Score PHQ 2 0     Alcohol Risk Factor Screening: On any occasion during the past 3 months, have you had more than 3 drinks containing alcohol? No    Do you average more than 7 drinks per week? No      Functional Ability and Level of Safety:     Hearing Loss   none    Activities of Daily Living   Partial assistance. Requires assistance with: ambulation-wheelchair for long distances; needs help with cooking otherwise does everything herself    Fall Risk     Fall Risk Assessment, last 12 mths 3/22/2017   Able to walk? No   Fall in past 12 months? -     Abuse Screen   Patient is not abused    Review of Systems   A comprehensive review of systems was negative except for that written in the HPI.     Physical Examination     Evaluation of Cognitive Function:  Mood/affect:  happy  Appearance: age appropriate  Family member/caregiver input: here with her  who is very attentive to her    See exam above    Patient Care Team:  Rafael Torres MD as PCP - General (Internal Medicine)  Reyna Bundy MD (Nephrology)  Jennifer Ellis MD (Cardiology)  Horacio Bonilla MD (Pulmonary Disease)  Vicky Naqvi, EWA (Nurse Practitioner)  Hiram Liu, AB as Ambulatory Care Navigator  Vikram Chapman MD (Endocrinology)    Advice/Referrals/Counseling   Education and counseling provided:  End-of-Life planning (with patient's consent)-see ACP note  Pneumococcal Vaccine-done  Influenza Vaccine-done  Screening Mammography-done  Colorectal cancer screening tests-up to date  Cardiovascular screening blood test-fasting lipids ordered  Bone mass measurement (DEXA)-due in April  Screening for glaucoma-done recently, will try to get notes      Assessment/Plan     Routine general medical examination at a health care facility-Refer to above for plan and to patient instructions for recommendations on HM  Advised to get shingles shot at her pharmacy  Order Dexa next visit    Screening for alcoholism-negative    Advance directive discussed with patient    RTC yearly for wellness visit

## 2017-03-22 NOTE — PROGRESS NOTES
1. Have you been to the ER, urgent care clinic since your last visit? Hospitalized since your last visit? Yes Where: [facility] for foot [facility]    2. Have you seen or consulted any other health care providers outside of the [facility] since your last visit? Include any pap smears or colon screening.  Patient just came from dialysis center

## 2017-03-22 NOTE — ACP (ADVANCE CARE PLANNING)
Advance Care Planning (ACP) Provider Conversation Snapshot    Date of ACP Conversation: 03/22/17  Persons included in Conversation:  patient and family  Length of ACP Conversation in minutes:  <16 minutes (Non-Billable)    Authorized Decision Maker (if patient is incapable of making informed decisions): This person is:    Other Legally Authorized Decision Maker (e.g. Next of [facility]           For Patients with Decision Making Capacity:   Values/Goals: Exploration of values, goals, and preferences if recovery is not expected, even with continued medical treatment in the event of:  Imminent death  Severe, permanent brain injury    Conversation Outcomes / Follow-Up Plan:   Recommended completion of Advance Directive form after review of ACP materials and conversation with prospective healthcare agent

## 2017-03-22 NOTE — PATIENT INSTRUCTIONS
Medicare Wellness Visit, Female    The best way to live healthy is to have a healthy lifestyle by eating a well-balanced diet, exercising regularly, limiting alcohol and stopping smoking. Regular physical exams and screening tests are another way to keep healthy. Preventive exams provided by your health care provider can find health problems before they become diseases or illnesses. Preventive services including immunizations, screening tests, monitoring and exams can help you take care of your own health. All people over age 72 should have a pneumovax  and and a prevnar shot to prevent pneumonia. These are once in a lifetime unless you and your provider decide differently. All people over 65 should have a yearly flu shot and a tetanus vaccine every 10 years. A bone mass density to screen for osteoporosis or thinning of the bones should be done every 2 years after 65. Screening for diabetes mellitus with a blood sugar test should be done every year. Glaucoma is a disease of the eye due to increased ocular pressure that can lead to blindness and it should be done every year by an eye professional.    Cardiovascular screening tests that check for elevated lipids (fatty part of blood) which can lead to heart disease and strokes should be done every 5 years. Colorectal screening that evaluates for blood or polyps in your colon should be done yearly as a stool test or every five years as a flexible sigmoidoscope or every 10 years as a colonoscopy up to age 76. Breast cancer screening with a mammogram is recommended biennially  for women age 54-69. Screening for cervical cancer with a pap smear and pelvic exam is recommended for women after age 72 years every 2 years up to age 79 or when the provider and patient decide to stop. If there is a history of cervical abnormalities or other increased risk for cancer then the test is recommended yearly.     Hepatitis C screening is also recommended for anyone born between [birth year] through [address]. A shingles vaccine is also recommended once in a lifetime after age 61. Your Medicare Wellness Exam is recommended annually. Here is a list of your current Health Maintenance items with a due date:  Health Maintenance Due   Topic Date Due    Shingles Vaccine  12/06/2003    Diabetic Foot Care  06/01/2015    Eye Exam  09/28/2016    Annual Well Visit  03/22/2017          Cataracts: Care Instructions  Your Care Instructions    A cataract is a clouding of the lens of the eye. The lens focuses light on the retina at the back of the eye. Cataracts block some of the light and make it harder for you to see clearly. Cataracts often develop when you get older. Most cataracts grow slowly. At first, you may just need stronger glasses to help you see better. Later, if the cataracts grow and begin to seriously impair your vision, you can have surgery to remove them. Follow-up care is a key part of your treatment and safety. Be sure to make and go to all appointments, and call your doctor if you are having problems. Its also a good idea to know your test results and keep a list of the medicines you take. How can you care for yourself at home? · Move room lights and use window shades to avoid glare. · Use more lighting or higher-watt bulbs. · Use a magnifying glass for reading. Look for large-print books and other reading material to make reading more enjoyable. · Have your eyes checked regularly, and update your glasses when needed. · Wear sunglasses to block out harmful sunlight. Buy sunglasses that screen out ultraviolet A and B (UVA and UVB) rays. · Do not smoke. Smoking can make cataracts worse. If you need help quitting, talk to your doctor about stop-smoking programs and medicines. These can increase your chances of quitting for good. When should you call for help?   Watch closely for changes in your health, and be sure to contact your doctor if:  · Your vision is getting worse. · You have increasing trouble doing everyday tasks, like driving or reading the newspaper, because of your eyesight. Where can you learn more? Go to http://mihai-zcah.info/. Enter P916 in the search box to learn more about \"Cataracts: Care Instructions. \"  Current as of: May 23, 2016  Content Version: 11.1  © 5011-0144 [employer]. Care instructions adapted under license by [employer] (which disclaims liability or warranty for this information). If you have questions about a medical condition or this instruction, always ask your healthcare professional. [place] [address] any warranty or liability for your use of this information.

## 2017-03-22 NOTE — MR AVS SNAPSHOT
Visit Information Date & Time Provider Department Dept. Phone Encounter #  
 3/22/2017 10:00 AM Zamzam Milner MD \[facility]\"" [id number] Follow-up Instructions Return in about 3 months (around 6/22/2017) for follow up. Your Appointments 5/2/2017 10:00 AM  
Follow Up with Zamzam Milner MD  
[place] [address] ([facility]) Appt Note: 3 mo f/u  
 [facility] [address]  
  
   
 [address] Upcoming Health Maintenance Date Due ZOSTER VACCINE AGE 60> 12/6/2003 FOOT EXAM Q1 6/1/2015 EYE EXAM RETINAL OR DILATED Q1 9/28/2016 MEDICARE YEARLY EXAM 3/22/2017 HEMOGLOBIN A1C Q6M 7/31/2017 LIPID PANEL Q1 9/20/2017 GLAUCOMA SCREENING Q2Y 9/28/2017 MICROALBUMIN Q1 1/31/2018 COLONOSCOPY 6/14/2018 BREAST CANCER SCRN MAMMOGRAM 7/15/2018 DTaP/Tdap/Td series (2 - Td) 12/1/2026 Allergies as of 3/22/2017  Review Complete On: 3/22/2017 By: Zamzam Milner MD  
  
 Severity Noted Reaction Type Reactions Ace Inhibitors  08/04/2015    Cough Aspirin  01/09/2013    Other (comments) Hallucinations Hydralazine  08/04/2015    Other (comments) Hallucinations, dizziness and numbness in legs Current Immunizations  Reviewed on 3/22/2017 Name Date Influenza Vaccine 9/10/2016, 10/1/2009 Pneumococcal Conjugate (PCV-13) 8/14/2016 Pneumococcal Vaccine (Unspecified Type) 4/8/2010 Tdap 12/1/2016 Reviewed by Tyra Wu LPN on 9/49/4527 at 61:14 AM  
 Reviewed by Zamzam Milner MD on 3/22/2017 at 10:25 AM  
You Were Diagnosed With   
  
 Codes Comments Routine general medical examination at a health care facility    -  Primary ICD-10-CM: Z00.00 ICD-9-CM: V70.0 Screening for alcoholism     ICD-10-CM: Z13.89 ICD-9-CM: V79.1 Advance directive discussed with patient     ICD-10-CM: Z71.89 ICD-9-CM: V65.49 Essential hypertension     ICD-10-CM: I10 
ICD-9-CM: 401.9 Hyperlipidemia, unspecified hyperlipidemia type     ICD-10-CM: E78.5 ICD-9-CM: 272.4 Hypothyroidism due to acquired atrophy of thyroid     ICD-10-CM: E03.4 ICD-9-CM: 244.8, 246.8 Cataract     ICD-10-CM: H26.9 ICD-9-CM: 366.9 Left ankle effusion     ICD-10-CM: M25.472 ICD-9-CM: 719.07 Vitals BP Pulse Temp Resp Height(growth percentile) Weight(growth percentile) 185/81 74 95.6 °F (35.3 °C) (Oral) 17 4' 8\" (1.422 m) 117 lb (53.1 kg) BMI OB Status Smoking Status 26.23 kg/m2 Postmenopausal Never Smoker Vitals History BMI and BSA Data Body Mass Index Body Surface Area  
 26.23 kg/m 2 1.45 m 2 Preferred Pharmacy Pharmacy Name [id number] [place] [address] [address] [facility] [phone] Your Updated Medication List  
  
   
This list is accurate as of: 3/22/17 11:01 AM.  Always use your most recent med list.  
  
  
  
  
 atorvastatin 40 mg tablet Commonly known as:  LIPITOR Take 20 mg by mouth daily. diclofenac 1 % Gel Commonly known as:  VOLTAREN Apply  to affected area every eight (8) hours as needed. HumaLOG 100 unit/mL injection Generic drug:  insulin lispro  
by SubCUTAneous route. Sliding scale LANTUS 100 unit/mL injection Generic drug:  insulin glargine 2 Units by SubCUTAneous route nightly. levothyroxine 75 mcg tablet Commonly known as:  SYNTHROID Take  by mouth Daily (before breakfast). losartan 100 mg tablet Commonly known as:  COZAAR Take 1 Tab by mouth daily. metoprolol tartrate 50 mg tablet Commonly known as:  LOPRESSOR Take  by mouth daily. oxyCODONE-acetaminophen 5-325 mg per tablet Commonly known as:  PERCOCET Take 1 Tab by mouth every four (4) hours as needed for Pain.  Max Daily Amount: 6 Tabs. OXYGEN-AIR DELIVERY SYSTEMS Take 3 L by inhalation continuous. pantoprazole 40 mg tablet Commonly known as:  PROTONIX Take 1 Tab by mouth every twelve (12) hours. FABBY-DARLIN 0.8 mg Tab tablet Generic drug:  b complex-vitamin c-folic acid 0.8 mg Take 0.8 mg by mouth daily. RENVELA 800 mg Tab tab Generic drug:  sevelamer carbonate Take 800 mg by mouth three (3) times daily. Patient reports medication is taken five times daily with meals and snacks SYMBICORT 160-4.5 mcg/actuation HFA inhaler Generic drug:  budesonide-formoterol Take 2 Puffs by inhalation two (2) times a day. We Performed the Following REFERRAL TO OPHTHALMOLOGY [REF57 Custom] Comments:  
 Please evaluate patient for cataracts-Dr. Ana Steven REFERRAL TO ORTHOPEDICS [[id number] Custom] Comments:  
 Please evaluate for left ankle effusion Follow-up Instructions Return in about 3 months (around 6/22/2017) for follow up. To-Do List   
 04/22/2017 Lab:  LIPID PANEL   
  
 04/22/2017 Lab:  URIC ACID Referral Information Referral ID Referred By Referred To  
  
 [id number] Deborah Dc Not Available Visits Status Start Date End Date 1 New Request 3/22/17 3/22/18 If your referral has a status of pending review or denied, additional information will be sent to support the outcome of this decision. Referral ID Referred By Referred To  
 [id number] Darío Leach MD  
   [address] [place] [address], [address] [place] [address] Phone: [phone] Fax: [phone] Visits Status Start Date End Date 1 New Request 3/22/17 3/22/18 If your referral has a status of pending review or denied, additional information will be sent to support the outcome of this decision. Patient Instructions Medicare Wellness Visit, Female The best way to live healthy is to have a healthy lifestyle by eating a well-balanced diet, exercising regularly, limiting alcohol and stopping smoking. Regular physical exams and screening tests are another way to keep healthy. Preventive exams provided by your health care provider can find health problems before they become diseases or illnesses. Preventive services including immunizations, screening tests, monitoring and exams can help you take care of your own health. All people over age 72 should have a pneumovax  and and a prevnar shot to prevent pneumonia. These are once in a lifetime unless you and your provider decide differently. All people over 65 should have a yearly flu shot and a tetanus vaccine every 10 years. A bone mass density to screen for osteoporosis or thinning of the bones should be done every 2 years after 65. Screening for diabetes mellitus with a blood sugar test should be done every year. Glaucoma is a disease of the eye due to increased ocular pressure that can lead to blindness and it should be done every year by an eye professional. 
 
Cardiovascular screening tests that check for elevated lipids (fatty part of blood) which can lead to heart disease and strokes should be done every 5 years. Colorectal screening that evaluates for blood or polyps in your colon should be done yearly as a stool test or every five years as a flexible sigmoidoscope or every 10 years as a colonoscopy up to age 76. Breast cancer screening with a mammogram is recommended biennially  for women age 54-69. Screening for cervical cancer with a pap smear and pelvic exam is recommended for women after age 72 years every 2 years up to age 79 or when the provider and patient decide to stop. If there is a history of cervical abnormalities or other increased risk for cancer then the test is recommended yearly.  
 
Hepatitis C screening is also recommended for anyone born between [birth year] through 1965. A shingles vaccine is also recommended once in a lifetime after age 61. Your Medicare Wellness Exam is recommended annually. Here is a list of your current Health Maintenance items with a due date: 
Health Maintenance Due Topic Date Due  Shingles Vaccine  12/06/2003  Diabetic Foot Care  06/01/2015 Keon Awe Eye Exam  09/28/2016 Keon Bryant Annual Well Visit  03/22/2017 Cataracts: Care Instructions Your Care Instructions A cataract is a clouding of the lens of the eye. The lens focuses light on the retina at the back of the eye. Cataracts block some of the light and make it harder for you to see clearly. Cataracts often develop when you get older. Most cataracts grow slowly. At first, you may just need stronger glasses to help you see better. Later, if the cataracts grow and begin to seriously impair your vision, you can have surgery to remove them. Follow-up care is a key part of your treatment and safety. Be sure to make and go to all appointments, and call your doctor if you are having problems. Its also a good idea to know your test results and keep a list of the medicines you take. How can you care for yourself at home? · Move room lights and use window shades to avoid glare. · Use more lighting or higher-watt bulbs. · Use a magnifying glass for reading. Look for large-print books and other reading material to make reading more enjoyable. · Have your eyes checked regularly, and update your glasses when needed. · Wear sunglasses to block out harmful sunlight. Buy sunglasses that screen out ultraviolet A and B (UVA and UVB) rays. · Do not smoke. Smoking can make cataracts worse. If you need help quitting, talk to your doctor about stop-smoking programs and medicines. These can increase your chances of quitting for good. When should you call for help? Watch closely for changes in your health, and be sure to contact your doctor if: 
· Your vision is getting worse. · You have increasing trouble doing everyday tasks, like driving or reading the newspaper, because of your eyesight. Where can you learn more? Go to http://mihai-zach.info/. Enter P916 in the search box to learn more about \"Cataracts: Care Instructions. \" Current as of: May 23, 2016 Content Version: 11.1 © 6501-9797 [employer]. Care instructions adapted under license by [employer] (which disclaims liability or warranty for this information). If you have questions about a medical condition or this instruction, always ask your healthcare professional. [address] any warranty or liability for your use of this information. Introducing [facility]! Dear Ivan Jackson: 
Thank you for requesting a Ticketbud account. Our records indicate that you have previously registered for a Ticketbud account but its currently inactive. Please call our Ticketbud support line at [phone]. Additional Information If you have questions, please visit the Frequently Asked Questions section of the Ticketbud website at [email or web address] [email or web address]/. Remember, Ticketbud is NOT to be used for urgent needs. For medical emergencies, dial 911. Now available from your iPhone and Android! Please provide this summary of care documentation to your next provider. Your primary care clinician is listed as Sabino Victoria. If you have any questions after today's visit, please call [phone].

## 2017-04-06 ENCOUNTER — HOSPITAL ENCOUNTER (OUTPATIENT)
Dept: VASCULAR SURGERY | Age: 72
Discharge: HOME | End: 2017-04-06
Attending: INTERNAL MEDICINE
Payer: MEDICARE

## 2017-04-06 DIAGNOSIS — I73.9 PVD (PERIPHERAL VASCULAR DISEASE) (HCC): ICD-10-CM

## 2017-04-06 PROCEDURE — 93923 UPR/LXTR ART STDY 3+ LVLS: CPT

## 2017-04-06 NOTE — PROCEDURES
[facility]  *** FINAL REPORT ***    Name: Aj Roca  MRN: [id number]    Outpatient  : 06 Dec 1945  HIS Order #: [id number]  [address] [facility] Visit #: [id number]  Date: 2017    TYPE OF TEST: Peripheral Arterial Testing    REASON FOR TEST  Cold right leg    Right Leg  Segmentals: Noncompressible                     mmHg  Brachial         163  High thigh  Low thigh  Calf             290  Posterior tibial 299  Dorsalis pedis   299  Peroneal  Metatarsal  Toe pressure     128  Doppler:    Normal  Ankle/Brachial: 1.83    Left Leg  Segmentals: Noncompressible                     mmHg  Brachial  High thigh  Low thigh  Calf             290  Posterior tibial 288  Dorsalis pedis   267  Peroneal  Metatarsal  Toe pressure     141  Doppler:    Normal  Ankle/Brachial: 1.77    INTERPRETATION/FINDINGS  Physiologic testing was performed using continuous wave Doppler and  segmental pressures. 1.  Normal lower extremity arterial examinaton by waveform analysis  bilaterally. 2. The right ankle/brachial index is 1.83 and the left ankle/brachial  index is 1.77.  3. Segmental pressures in both legs are not measurable due to  extensive arterial calcification. The ankle/brachial indexes are  therefore unreliable predictors of distal arterial perfusion. 4. Normal digital brachial index of 0.60 or greater bilaterally with  no signficiant digital disease identified. 5. Left brachial pressure deferred due to AVF. 6. Thigh pressures deferred bilaterally due to patient discomfort and  non compressible vessels. ADDITIONAL COMMENTS    I have personally reviewed the data relevant to the interpretation of  this  study. TECHNOLOGIST: Ganesh Freeman. Earnestine Rashid  Signed: 2017 11:49 AM    PHYSICIAN: Mari Lim.  Saida Bernal MD  Signed: 2017 03:56 PM

## 2017-04-17 ENCOUNTER — TELEPHONE (OUTPATIENT)
Dept: FAMILY MEDICINE CLINIC | Age: 72
End: 2017-04-17

## 2017-04-17 NOTE — TELEPHONE ENCOUNTER
Called and spoke with Cathy Valdez. Verified two patient identifiers. Informed patient of arterial circulation results per provider. Made patient aware that her that the circulation test was normal. Patient verbalized understanding.

## 2017-04-22 DIAGNOSIS — E78.5 HYPERLIPIDEMIA, UNSPECIFIED HYPERLIPIDEMIA TYPE: Chronic | ICD-10-CM

## 2017-04-22 DIAGNOSIS — M25.472 LEFT ANKLE EFFUSION: ICD-10-CM

## 2017-05-02 ENCOUNTER — OFFICE VISIT (OUTPATIENT)
Dept: FAMILY MEDICINE CLINIC | Age: 72
End: 2017-05-02

## 2017-05-02 VITALS
BODY MASS INDEX: 26.32 KG/M2 | HEIGHT: 56 IN | TEMPERATURE: 96.2 F | OXYGEN SATURATION: 93 % | DIASTOLIC BLOOD PRESSURE: 92 MMHG | WEIGHT: 117 LBS | SYSTOLIC BLOOD PRESSURE: 161 MMHG | HEART RATE: 51 BPM | RESPIRATION RATE: 16 BRPM

## 2017-05-02 DIAGNOSIS — Z99.2 ESRD (END STAGE RENAL DISEASE) ON DIALYSIS (HCC): ICD-10-CM

## 2017-05-02 DIAGNOSIS — I10 ESSENTIAL HYPERTENSION: ICD-10-CM

## 2017-05-02 DIAGNOSIS — E78.5 HYPERLIPIDEMIA, UNSPECIFIED HYPERLIPIDEMIA TYPE: Chronic | ICD-10-CM

## 2017-05-02 DIAGNOSIS — E11.65 TYPE 2 DIABETES MELLITUS WITH HYPERGLYCEMIA, WITH LONG-TERM CURRENT USE OF INSULIN (HCC): Primary | ICD-10-CM

## 2017-05-02 DIAGNOSIS — Z79.4 TYPE 2 DIABETES MELLITUS WITH HYPERGLYCEMIA, WITH LONG-TERM CURRENT USE OF INSULIN (HCC): Primary | ICD-10-CM

## 2017-05-02 DIAGNOSIS — E03.4 HYPOTHYROIDISM DUE TO ACQUIRED ATROPHY OF THYROID: Chronic | ICD-10-CM

## 2017-05-02 DIAGNOSIS — N18.6 ESRD (END STAGE RENAL DISEASE) ON DIALYSIS (HCC): ICD-10-CM

## 2017-05-02 DIAGNOSIS — M10.00 IDIOPATHIC GOUT, UNSPECIFIED CHRONICITY, UNSPECIFIED SITE: Chronic | ICD-10-CM

## 2017-05-02 DIAGNOSIS — J44.9 CHRONIC OBSTRUCTIVE PULMONARY DISEASE, UNSPECIFIED COPD TYPE (HCC): Chronic | ICD-10-CM

## 2017-05-02 LAB — HBA1C MFR BLD HPLC: 5.3 %

## 2017-05-02 RX ORDER — AMLODIPINE BESYLATE 5 MG/1
5 TABLET ORAL DAILY
Qty: 30 TAB | Refills: 6 | Status: SHIPPED | OUTPATIENT
Start: 2017-05-02 | End: 2017-12-12 | Stop reason: SDUPTHER

## 2017-05-02 NOTE — PROGRESS NOTES
Chief Complaint   Patient presents with    Follow Up Chronic Condition       Pt is a 66y.o. year old female who presents for follow up of her chronic medical problems    Health Maintenance Due   Topic Date Due    ZOSTER VACCINE AGE 60>  12/06/2003-    FOOT EXAM Q1  06/01/2015-today    EYE EXAM RETINAL OR DILATED Q1  09/28/2016-wears eyeglasses     BP Readings from Last 3 Encounters:   05/04/17 190/55   05/02/17 (!) 161/92   03/22/17 185/81   Repeat BP-still elevated; per  BP at home is also up and down    Denies polyuria, polydipsia and polyphagia  Has not taken any Humalog and is only on 2 units of Lantus  Dr. Minnie Painting next week    Lab Results   Component Value Date/Time    TSH 0.021 05/02/2017 11:07 AM   On Synthroid    Lab Results   Component Value Date/Time    Cholesterol, total 164 04/16/2013 12:00 AM    HDL Cholesterol 88 04/16/2013 12:00 AM    LDL, calculated 64 04/16/2013 12:00 AM    VLDL, calculated 11 09/29/2010 02:20 PM    Triglyceride 141 10/09/2016 03:30 AM    CHOL/HDL Ratio 2.3 09/29/2010 02:20 PM   Fasting?yes  On Lipitor    Uses the CPAP regularly  Not really taking her Symbicort-no SOB    Hx of gout-no attacks for a while now      ROS:    Pt denies: Wt loss, Fever/Chills, HA, Visual changes, Fatigue, Chest pain, SOB, LEBLANC, Abd pain, N/V/D/C, Blood in stool or urine, Edema. Pertinent positive as above in HPI.  All others were negative    Patient Active Problem List   Diagnosis Code    Gout M10.9    Hyperlipidemia E78.5    Hypothyroidism E03.9    Tubular adenoma of colon D12.6    Anemia in chronic kidney disease N18.9, D63.1    Chronic obstructive pulmonary disease (HCC) J44.9    MELANIE on CPAP G47.33, Z99.89    Gastrointestinal hemorrhage associated with peptic ulcer K27.4    Acute blood loss anemia D62    Elevated LFTs R94.5    Hyperphosphatemia E83.39    CHF (congestive heart failure) ([facility]) I50.9    Thrombocytopenia due to blood loss D69.59    Type 2 diabetes mellitus with hyperglycemia, with long-term current use of insulin (HCC) E11.65, Z79.4    Advance directive discussed with patient Z70.80    Essential hypertension I10    Hypothyroidism due to acquired atrophy of thyroid E03.4    ESRD (end stage renal disease) on dialysis ([facility] N18.6, Z99.2       Past Medical History:   Diagnosis Date    Anemia in CKD (chronic kidney disease)     Aortic stenosis     Asthma     Calculus of kidney     Cancer ([facility]     right kidney    Cardiomyopathy ([facility]     EF 20-25% (10/16), 55% (06/16)    Chronic kidney disease     dialysis MWF    Chronic kidney disease, stage V ([facility]     Diabetes ([facility]     Dialysis patient ([facility]     Dialysis patient ([facility]     M-W-F; LEFT AVF; PERM CATH RIGHT CHEST    Gastrointestinal hemorrhage associated with peptic ulcer     Gout     Hypercholesterolemia     Hypertension     Primary hyperparathyroidism ([facility] 2010    s/p parathyroid adenoma excision    Secondary hyperparathyroidism (of renal origin)     Sleep apnea     C PAP    Status post nephrectomy     right    Thyroid disease        Current Outpatient Prescriptions   Medication Sig Dispense Refill    amLODIPine (NORVASC) 5 mg tablet Take 1 Tab by mouth daily. 30 Tab 6    losartan (COZAAR) 100 mg tablet TAKE 1 TABLET BY MOUTH ONCE DAILY 30 Tab 6    RENVELA 800 mg tab tab Take 800 mg by mouth three (3) times daily. Patient reports medication is taken five times daily with meals and snacks  11    diclofenac (VOLTAREN) 1 % gel Apply  to affected area every eight (8) hours as needed. 2 g 0    oxyCODONE-acetaminophen (PERCOCET) 5-325 mg per tablet Take 1 Tab by mouth every four (4) hours as needed for Pain. Max Daily Amount: 6 Tabs. 20 Tab 0    FABBY-DARLIN 0.8 mg tab tablet Take 0.8 mg by mouth daily. 11    OXYGEN-AIR DELIVERY SYSTEMS Take 3 L by inhalation continuous.  atorvastatin (LIPITOR) 40 mg tablet Take 20 mg by mouth daily.   5    metoprolol (LOPRESSOR) 50 mg tablet Take  by mouth daily.      insulin lispro (HUMALOG) 100 unit/mL injection by SubCUTAneous route. Sliding scale       budesonide-formoterol (SYMBICORT) 160-4.5 mcg/actuation HFA inhaler Take 2 Puffs by inhalation two (2) times a day.  insulin glargine (LANTUS) 100 unit/mL injection 2 Units by SubCUTAneous route nightly.  levothyroxine (SYNTHROID) 50 mcg tablet Take 1 Tab by mouth Daily (before breakfast). 90 Tab 1       History   Smoking Status    Never Smoker   Smokeless Tobacco    Never Used       Allergies   Allergen Reactions    Ace Inhibitors Cough    Aspirin Other (comments)     Hallucinations    Hydralazine Other (comments)     Hallucinations, dizziness and numbness in legs        Patient Labs were reviewed: yes      Patient Past Records were reviewed:  yes        Objective:     Vitals:    05/02/17 1014   BP: (!) 161/92   Pulse: (!) 51   Resp: 16   Temp: 96.2 °F (35.7 °C)   TempSrc: Oral   SpO2: 93%   Weight: 117 lb (53.1 kg)   Height: 4' 8\" (1.422 m)     Body mass index is 26.23 kg/(m^2). Exam:   Appearance: alert, well appearing,  oriented to person, place, and time, acyanotic, in no respiratory distress and well hydrated.  Wheelchair bound  HEENT:  NC/AT, pink conj, anicteric sclerae  Neck:  No cervical lymphadenopathy, no JVD, no thyromegaly, no carotid bruit  Heart:  RRR without M/R/G  Lungs:  CTAB, no rhonchi, rales, or wheezes with good air exchange   Abdomen:  Non-tender, pos bowel sounds, no hepatosplenomegaly  Ext:  No C/C/E    Skin: no rash  Neuro: no lateralizing signs, CNs II-XII intact    A1C today is 5.3%    Diabetic foot exam:     Left: Reflexes 2+     Vibratory sensation normal    Proprioception normal   Sharp/dull discrimination normal    Filament test normal sensation with micro filament   Pulse DP: 2+ (normal)   Pulse PT: 2+ (normal)   Deformities: None  Right: Reflexes 2+   Vibratory sensation normal   Proprioception normal   Sharp/dull discrimination normal   Filament test normal sensation with micro filament   Pulse DP: 2+ (normal)   Pulse PT: 2+ (normal)   Deformities: None    Assessment/ Plan:   Zbigniew Lazaro was seen today for follow up chronic condition. Diagnoses and all orders for this visit:    Type 2 diabetes mellitus with hyperglycemia, with long-term current use of insulin ([facility])-controlled, advised to stop the Lantus as she is only on 2 units anyway; continue with Humalog with meals based on SSI coverage  -     AMB POC HEMOGLOBIN A1C  -      DIABETES FOOT EXAM    Essential hypertension-uncontrolled, will add Norvasc to meds  -     amLODIPine (NORVASC) 5 mg tablet; Take 1 Tab by mouth daily. Hypothyroidism due to acquired atrophy of thyroid-on Synthroid  -     TSH 3RD GENERATION; Future    ESRD (end stage renal disease) on dialysis ([facility])-Left forearm shunt is not yet working  -     URIC ACID; Future    Chronic obstructive pulmonary disease, unspecified COPD type ([facility])-currently off inhalers and asymptomatic    Hyperlipidemia, unspecified hyperlipidemia type-on Lipitor, check fasting lipids today  -     LIPID PANEL; Future    Idiopathic gout, unspecified chronicity, unspecified site  -     URIC ACID; Future    Follow-up Disposition:  Return in about 3 months (around 8/2/2017) for follow up. I have discussed the diagnosis with the patient and the intended plan as seen in the above orders. The patient has received an After-Visit Summary and questions were answered concerning future plans. Medication Side Effects and Warnings were discussed with patient: yes    Patient verbalized understanding of above instructions.     Micky Sosa MD  Internal Medicine  [facility]

## 2017-05-02 NOTE — MR AVS SNAPSHOT
Visit Information Date & Time Provider Department Dept. Phone Encounter #  
 5/2/2017 10:00 AM Philip Bradford MD [address] [id number] Follow-up Instructions Return in about 3 months (around 8/2/2017) for follow up. Your Appointments 5/30/2017 11:30 AM  
Follow Up with MD Benita Xavier 23 ([address]) Appt Note: PRE OP clearance for cataract surgery(RIGHT) [facility]  
  
   
 [address] [place] 3/19/2018 10:30 AM  
Office Visit with MD Benita Xavier 23 ([address]) Appt Note: mwv  
 [phone] [facility]  
  
   
 [address] [place] Upcoming Health Maintenance Date Due ZOSTER VACCINE AGE 60> 12/6/2003 FOOT EXAM Q1 6/1/2015 EYE EXAM RETINAL OR DILATED Q1 9/28/2016 HEMOGLOBIN A1C Q6M 7/31/2017 INFLUENZA AGE 9 TO ADULT 8/1/2017 LIPID PANEL Q1 9/20/2017 GLAUCOMA SCREENING Q2Y 9/28/2017 MICROALBUMIN Q1 1/31/2018 MEDICARE YEARLY EXAM 3/23/2018 COLONOSCOPY 6/14/2018 BREAST CANCER SCRN MAMMOGRAM 7/15/2018 DTaP/Tdap/Td series (2 - Td) 12/1/2026 Allergies as of 5/2/2017  Review Complete On: 5/2/2017 By: Philip Bradford MD  
  
 Severity Noted Reaction Type Reactions Ace Inhibitors  08/04/2015    Cough Aspirin  01/09/2013    Other (comments) Hallucinations Hydralazine  08/04/2015    Other (comments) Hallucinations, dizziness and numbness in legs Current Immunizations  Reviewed on 3/22/2017 Name Date Influenza Vaccine 9/10/2016, 10/1/2009 Pneumococcal Conjugate (PCV-13) 8/14/2016 Pneumococcal Vaccine (Unspecified Type) 4/8/2010 Tdap 12/1/2016 Not reviewed this visit You Were Diagnosed With   
  
 Codes Comments Type 2 diabetes mellitus with hyperglycemia, with long-term current use of insulin (HCC)    -  Primary ICD-10-CM: E11.65, Z79.4 ICD-9-CM: 250.00, 790.29, V58.67 Essential hypertension     ICD-10-CM: I10 
ICD-9-CM: 401.9 Hypothyroidism due to acquired atrophy of thyroid     ICD-10-CM: E03.4 ICD-9-CM: 244.8, 246.8 ESRD (end stage renal disease) on dialysis ([facility]     ICD-10-CM: N18.6, Z99.2 ICD-9-CM: 585.6, V45.11 Chronic obstructive pulmonary disease, unspecified COPD type ([facility]     ICD-10-CM: J44.9 ICD-9-CM: 714 Hyperlipidemia, unspecified hyperlipidemia type     ICD-10-CM: E78.5 ICD-9-CM: 272.4 Idiopathic gout, unspecified chronicity, unspecified site     ICD-10-CM: M10.00 ICD-9-CM: 274.9 Vitals BP Pulse Temp Resp Height(growth percentile) Weight(growth percentile) (!) 161/92 (!) 51 96.2 °F (35.7 °C) (Oral) 16 4' 8\" (1.422 m) 117 lb (53.1 kg) SpO2 BMI OB Status Smoking Status 93% 26.23 kg/m2 Postmenopausal Never Smoker Vitals History BMI and BSA Data Body Mass Index Body Surface Area  
 26.23 kg/m 2 1.45 m 2 Preferred Pharmacy Pharmacy Name Phone [id number] [place] [address] [address] [facility] [phone] Your Updated Medication List  
  
   
This list is accurate as of: 5/2/17 11:05 AM.  Always use your most recent med list.  
  
  
  
  
 atorvastatin 40 mg tablet Commonly known as:  LIPITOR Take 20 mg by mouth daily. diclofenac 1 % Gel Commonly known as:  VOLTAREN Apply  to affected area every eight (8) hours as needed. HumaLOG 100 unit/mL injection Generic drug:  insulin lispro  
by SubCUTAneous route. Sliding scale LANTUS 100 unit/mL injection Generic drug:  insulin glargine 2 Units by SubCUTAneous route nightly. levothyroxine 75 mcg tablet Commonly known as:  SYNTHROID Take  by mouth Daily (before breakfast). losartan 100 mg tablet Commonly known as:  COZAAR  
TAKE 1 TABLET BY MOUTH ONCE DAILY  
  
 metoprolol tartrate 50 mg tablet Commonly known as:  LOPRESSOR Take  by mouth daily. oxyCODONE-acetaminophen 5-325 mg per tablet Commonly known as:  PERCOCET Take 1 Tab by mouth every four (4) hours as needed for Pain. Max Daily Amount: 6 Tabs. OXYGEN-AIR DELIVERY SYSTEMS Take 3 L by inhalation continuous. FABBY-DARLIN 0.8 mg Tab tablet Generic drug:  b complex-vitamin c-folic acid 0.8 mg Take 0.8 mg by mouth daily. RENVELA 800 mg Tab tab Generic drug:  sevelamer carbonate Take 800 mg by mouth three (3) times daily. Patient reports medication is taken five times daily with meals and snacks SYMBICORT 160-4.5 mcg/actuation HFA inhaler Generic drug:  budesonide-formoterol Take 2 Puffs by inhalation two (2) times a day. We Performed the Following AMB POC HEMOGLOBIN A1C [76384 CPT(R)]  DIABETES FOOT EXAM [HM Custom] Follow-up Instructions Return in about 3 months (around 8/2/2017) for follow up. To-Do List   
 05/02/2017 Lab:  LIPID PANEL   
  
 05/02/2017 Lab:  TSH 3RD GENERATION   
  
 05/02/2017 Lab:  URIC ACID Patient Instructions DASH Diet: Care Instructions Your Care Instructions The DASH diet is an eating plan that can help lower your blood pressure. DASH stands for Dietary Approaches to Stop Hypertension. Hypertension is high blood pressure. The DASH diet focuses on eating foods that are high in calcium, potassium, and magnesium. These nutrients can lower blood pressure. The foods that are highest in these nutrients are fruits, vegetables, low-fat dairy products, nuts, seeds, and legumes. But taking calcium, potassium, and magnesium supplements instead of eating foods that are high in those nutrients does not have the same effect. The DASH diet also includes whole grains, fish, and poultry. The DASH diet is one of several lifestyle changes your doctor may recommend to lower your high blood pressure. Your doctor may also want you to decrease the amount of sodium in your diet. Lowering sodium while following the DASH diet can lower blood pressure even further than just the DASH diet alone. Follow-up care is a key part of your treatment and safety. Be sure to make and go to all appointments, and call your doctor if you are having problems. It's also a good idea to know your test results and keep a list of the medicines you take. How can you care for yourself at home? Following the DASH diet · Eat 4 to 5 servings of fruit each day. A serving is 1 medium-sized piece of fruit, ½ cup chopped or canned fruit, 1/4 cup dried fruit, or 4 ounces (½ cup) of fruit juice. Choose fruit more often than fruit juice. · Eat 4 to 5 servings of vegetables each day. A serving is 1 cup of lettuce or raw leafy vegetables, ½ cup of chopped or cooked vegetables, or 4 ounces (½ cup) of vegetable juice. Choose vegetables more often than vegetable juice. · Get 2 to 3 servings of low-fat and fat-free dairy each day. A serving is 8 ounces of milk, 1 cup of yogurt, or 1 ½ ounces of cheese. · Eat 6 to 8 servings of grains each day. A serving is 1 slice of bread, 1 ounce of dry cereal, or ½ cup of cooked rice, pasta, or cooked cereal. Try to choose whole-grain products as much as possible. · Limit lean meat, poultry, and fish to 2 servings each day. A serving is 3 ounces, about the size of a deck of cards. · Eat 4 to 5 servings of nuts, seeds, and legumes (cooked dried beans, lentils, and split peas) each week. A serving is 1/3 cup of nuts, 2 tablespoons of seeds, or ½ cup of cooked beans or peas. · Limit fats and oils to 2 to 3 servings each day. A serving is 1 teaspoon of vegetable oil or 2 tablespoons of salad dressing. · Limit sweets and added sugars to 5 servings or less a week.  A serving is 1 tablespoon jelly or jam, ½ cup sorbet, or 1 cup of lemonade. · Eat less than 2,300 milligrams (mg) of sodium a day. If you limit your sodium to 1,500 mg a day, you can lower your blood pressure even more. Tips for success · Start small. Do not try to make dramatic changes to your diet all at once. You might feel that you are missing out on your favorite foods and then be more likely to not follow the plan. Make small changes, and stick with them. Once those changes become habit, add a few more changes. · Try some of the following: ¨ Make it a goal to eat a fruit or vegetable at every meal and at snacks. This will make it easy to get the recommended amount of fruits and vegetables each day. ¨ Try yogurt topped with fruit and nuts for a snack or healthy dessert. ¨ Add lettuce, tomato, cucumber, and onion to sandwiches. ¨ Combine a ready-made pizza crust with low-fat mozzarella cheese and lots of vegetable toppings. Try using tomatoes, squash, spinach, broccoli, carrots, cauliflower, and onions. ¨ Have a variety of cut-up vegetables with a low-fat dip as an appetizer instead of chips and dip. ¨ Sprinkle sunflower seeds or chopped almonds over salads. Or try adding chopped walnuts or almonds to cooked vegetables. ¨ Try some vegetarian meals using beans and peas. Add garbanzo or kidney beans to salads. Make burritos and tacos with mashed barrera beans or black beans. Where can you learn more? Go to http://mihai-zach.info/. Enter J522 in the search box to learn more about \"DASH Diet: Care Instructions. \" Current as of: March 23, 2016 Content Version: 11.2 © 0348-4282 [employer]. Care instructions adapted under license by [employer] (which disclaims liability or warranty for this information).  If you have questions about a medical condition or this instruction, always ask your healthcare professional. [employer] disclaims any warranty or liability for your use of this information. Introducing [facility]! Anderson Hunt introduces [employer] patient portal. Now you can access parts of your medical record, email your doctor's office, and request medication refills online. 1. In your internet browser, go to [email or web address] 2. Click on the First Time User? Click Here link in the Sign In box. You will see the New Member Sign Up page. 3. Enter your [employer] Access Code exactly as it appears below. You will not need to use this code after youve completed the sign-up process. If you do not sign up before the expiration date, you must request a new code. · [employer] Access Code: [email or web address] Expires: 7/1/2017  2:26 PM 
 
4. Enter the last four digits of your Social Security Number (xxxx) and Date of Birth (mm/dd/yyyy) as indicated and click Submit. You will be taken to the next sign-up page. 5. Create a [employer] M[employer] ID. This will be your [employer] login ID and cannot be changed, so think of one that is secure and easy to remember. 6. Create a [employer] password. You can change your password at any time. 7. Enter your Password Reset Question and Answer. This can be used at a later time if you forget your password. 8. Enter your e-mail address. You will receive e-mail notification when new information is available in [address] 9. Click Sign Up. You can now view and download portions of your medical record. 10. Click the Download Summary menu link to download a portable copy of your medical information. If you have questions, please visit the Frequently Asked Questions section of the [employer] website. Remember, [employer] is NOT to be used for urgent needs. For medical emergencies, dial 911. Now available from your iPhone and Android! Please provide this summary of care documentation to your next provider. Your primary care clinician is listed as Hamilton Xiong. If you have any questions after today's visit, please call [phone].

## 2017-05-02 NOTE — PROGRESS NOTES
Jaiden Washburn is here today to follow up for chronic conditions. 1. Have you been to the ER, urgent care clinic since your last visit? Hospitalized since your last visit? No    2. Have you seen or consulted any other health care providers outside of the [address] [place] [address] since your last visit? Include any pap smears or colon screening.  No

## 2017-05-03 ENCOUNTER — ANESTHESIA EVENT (OUTPATIENT)
Dept: ENDOSCOPY | Age: 72
End: 2017-05-03
Payer: MEDICARE

## 2017-05-03 LAB
CHOLEST SERPL-MCNC: 171 MG/DL (ref 100–199)
HDLC SERPL-MCNC: 87 MG/DL
INTERPRETATION, 910389: NORMAL
LDLC SERPL CALC-MCNC: 71 MG/DL (ref 0–99)
TRIGL SERPL-MCNC: 63 MG/DL (ref 0–149)
TSH SERPL DL<=0.005 MIU/L-ACNC: 0.02 UIU/ML (ref 0.45–4.5)
URATE SERPL-MCNC: 5.1 MG/DL (ref 2.5–7.1)
VLDLC SERPL CALC-MCNC: 13 MG/DL (ref 5–40)

## 2017-05-04 ENCOUNTER — HOSPITAL ENCOUNTER (OUTPATIENT)
Age: 72
Setting detail: OUTPATIENT SURGERY
Discharge: HOME | End: 2017-05-04
Attending: INTERNAL MEDICINE | Admitting: INTERNAL MEDICINE
Payer: MEDICARE

## 2017-05-04 ENCOUNTER — PATIENT OUTREACH (OUTPATIENT)
Dept: FAMILY MEDICINE CLINIC | Age: 72
End: 2017-05-04

## 2017-05-04 ENCOUNTER — ANESTHESIA (OUTPATIENT)
Dept: ENDOSCOPY | Age: 72
End: 2017-05-04
Payer: MEDICARE

## 2017-05-04 VITALS
DIASTOLIC BLOOD PRESSURE: 55 MMHG | HEIGHT: 56 IN | SYSTOLIC BLOOD PRESSURE: 190 MMHG | BODY MASS INDEX: 25.87 KG/M2 | WEIGHT: 115 LBS | TEMPERATURE: 97.2 F | HEART RATE: 93 BPM | RESPIRATION RATE: 16 BRPM | OXYGEN SATURATION: 97 %

## 2017-05-04 DIAGNOSIS — E03.4 HYPOTHYROIDISM DUE TO ACQUIRED ATROPHY OF THYROID: Primary | Chronic | ICD-10-CM

## 2017-05-04 LAB
BUN BLD-MCNC: 54 MG/DL (ref 7–18)
CHLORIDE BLD-SCNC: 104 MMOL/L (ref 100–108)
GLUCOSE BLD STRIP.AUTO-MCNC: 75 MG/DL (ref 74–106)
GLUCOSE BLD STRIP.AUTO-MCNC: 92 MG/DL (ref 70–110)
HCT VFR BLD CALC: 36 % (ref 36–49)
HGB BLD-MCNC: 12.2 G/DL (ref 12–16)
POTASSIUM BLD-SCNC: 8.7 MMOL/L (ref 3.5–5.5)
POTASSIUM SERPL-SCNC: 3.6 MMOL/L (ref 3.5–5.5)
SODIUM BLD-SCNC: 136 MMOL/L (ref 136–145)

## 2017-05-04 PROCEDURE — 74011250636 HC RX REV CODE- 250/636: Performed by: NURSE ANESTHETIST, CERTIFIED REGISTERED

## 2017-05-04 PROCEDURE — 76040000019: Performed by: INTERNAL MEDICINE

## 2017-05-04 PROCEDURE — 82947 ASSAY GLUCOSE BLOOD QUANT: CPT

## 2017-05-04 PROCEDURE — 76060000031 HC ANESTHESIA FIRST 0.5 HR: Performed by: INTERNAL MEDICINE

## 2017-05-04 PROCEDURE — 74011000258 HC RX REV CODE- 258

## 2017-05-04 PROCEDURE — 88305 TISSUE EXAM BY PATHOLOGIST: CPT | Performed by: INTERNAL MEDICINE

## 2017-05-04 PROCEDURE — 77030009426 HC FCPS BIOP ENDOSC BSC -B: Performed by: INTERNAL MEDICINE

## 2017-05-04 PROCEDURE — 84132 ASSAY OF SERUM POTASSIUM: CPT | Performed by: ANESTHESIOLOGY

## 2017-05-04 PROCEDURE — 74011250636 HC RX REV CODE- 250/636

## 2017-05-04 PROCEDURE — 36415 COLL VENOUS BLD VENIPUNCTURE: CPT | Performed by: ANESTHESIOLOGY

## 2017-05-04 PROCEDURE — 82962 GLUCOSE BLOOD TEST: CPT

## 2017-05-04 RX ORDER — PROPOFOL 10 MG/ML
INJECTION, EMULSION INTRAVENOUS AS NEEDED
Status: DISCONTINUED | OUTPATIENT
Start: 2017-05-04 | End: 2017-05-04 | Stop reason: HOSPADM

## 2017-05-04 RX ORDER — DEXTROSE MONOHYDRATE 25 G/50ML
25-50 INJECTION, SOLUTION INTRAVENOUS AS NEEDED
Status: DISCONTINUED | OUTPATIENT
Start: 2017-05-04 | End: 2017-05-04 | Stop reason: HOSPADM

## 2017-05-04 RX ORDER — LABETALOL HCL 20 MG/4 ML
10 SYRINGE (ML) INTRAVENOUS AS NEEDED
Status: DISCONTINUED | OUTPATIENT
Start: 2017-05-04 | End: 2017-05-04 | Stop reason: HOSPADM

## 2017-05-04 RX ORDER — DEXTROSE MONOHYDRATE 25 G/50ML
INJECTION, SOLUTION INTRAVENOUS
Status: COMPLETED
Start: 2017-05-04 | End: 2017-05-04

## 2017-05-04 RX ORDER — SODIUM CHLORIDE 9 MG/ML
25 INJECTION, SOLUTION INTRAVENOUS CONTINUOUS
Status: DISCONTINUED | OUTPATIENT
Start: 2017-05-04 | End: 2017-05-04 | Stop reason: HOSPADM

## 2017-05-04 RX ORDER — MAGNESIUM SULFATE 100 %
4 CRYSTALS MISCELLANEOUS AS NEEDED
Status: DISCONTINUED | OUTPATIENT
Start: 2017-05-04 | End: 2017-05-04 | Stop reason: HOSPADM

## 2017-05-04 RX ORDER — HYDRALAZINE HYDROCHLORIDE 20 MG/ML
INJECTION INTRAMUSCULAR; INTRAVENOUS
Status: COMPLETED
Start: 2017-05-04 | End: 2017-05-04

## 2017-05-04 RX ORDER — INSULIN LISPRO 100 [IU]/ML
INJECTION, SOLUTION INTRAVENOUS; SUBCUTANEOUS
Status: DISCONTINUED | OUTPATIENT
Start: 2017-05-04 | End: 2017-05-04 | Stop reason: HOSPADM

## 2017-05-04 RX ORDER — LEVOTHYROXINE SODIUM 50 UG/1
50 TABLET ORAL
Qty: 90 TAB | Refills: 1 | Status: SHIPPED | OUTPATIENT
Start: 2017-05-04 | End: 2017-05-30 | Stop reason: SDUPTHER

## 2017-05-04 RX ORDER — HYDRALAZINE HYDROCHLORIDE 20 MG/ML
20 INJECTION INTRAMUSCULAR; INTRAVENOUS ONCE
Status: DISCONTINUED | OUTPATIENT
Start: 2017-05-04 | End: 2017-05-04

## 2017-05-04 RX ADMIN — HYDRALAZINE HYDROCHLORIDE 20 MG: 20 INJECTION INTRAMUSCULAR; INTRAVENOUS at 09:18

## 2017-05-04 RX ADMIN — DEXTROSE MONOHYDRATE 12.5 G: 25 INJECTION, SOLUTION INTRAVENOUS at 09:26

## 2017-05-04 RX ADMIN — SODIUM CHLORIDE 25 ML/HR: 900 INJECTION, SOLUTION INTRAVENOUS at 08:58

## 2017-05-04 RX ADMIN — PROPOFOL 20 MG: 10 INJECTION, EMULSION INTRAVENOUS at 10:32

## 2017-05-04 NOTE — IP AVS SNAPSHOT
[address] [address] Maranda King  
[phone] Patient: Payal Eisenberg MRN: [id number] :1945 You are allergic to the following Allergen Reactions Ace Inhibitors Cough Aspirin Other (comments) Hallucinations Hydralazine Other (comments) Hallucinations, dizziness and numbness in legs Recent Documentation Height Weight Breastfeeding? BMI OB Status Smoking Status 1.422 m 52.2 kg No 25.78 kg/m2 Postmenopausal Never Smoker Emergency Contacts Name Discharge Info Relation Home Work Mobile Taisha Ramon 1927 CAREGIVER [3] Spouse [3] [phone] About your hospitalization You were admitted on: May 4, 2017 [facility] You were discharged on: May 4, 2017 Unit phone number:  [phone] Why you were hospitalized Your primary diagnosis was:  Not on File Providers Seen During Your Hospitalizations Provider Role Specialty Primary office phone Gayle Alvarez MD Attending Provider Gastroenterology [phone] Your Primary Care Physician (PCP) Primary Care Physician Office Phone Office Fax Samina Small [phone] Follow-up Information Follow up With Details Comments Contact Info Martin Patel MD Schedule an appointment as soon as possible for a visit  [address] [facility] [address] [address] 
[phone] Gayle Alvarez MD Schedule an appointment as soon as possible for a visit in 7 months  [address] [facility] [address] [address] 
[phone] Your Appointments Tuesday May 30, 2017 11:30 AM EDT Follow Up with Martin Patel MD  
[address] [address] ([facility]) [facility] [address] [address]  
[phone] Current Discharge Medication List  
  
CONTINUE these medications which have CHANGED Dose & Instructions Dispensing Information Comments Morning Noon Evening Bedtime  
 levothyroxine 50 mcg tablet Commonly known as:  SYNTHROID What changed:   
- medication strength 
- how much to take Your last dose was: Your next dose is:    
   
   
 Dose:  50 mcg Take 1 Tab by mouth Daily (before breakfast). Quantity:  90 Tab Refills:  1 CONTINUE these medications which have NOT CHANGED Dose & Instructions Dispensing Information Comments Morning Noon Evening Bedtime  
 amLODIPine 5 mg tablet Commonly known as:  Robin Mcguire Your last dose was: Your next dose is:    
   
   
 Dose:  5 mg Take 1 Tab by mouth daily. Quantity:  30 Tab Refills:  6  
     
   
   
   
  
 atorvastatin 40 mg tablet Commonly known as:  LIPITOR Your last dose was: Your next dose is:    
   
   
 Dose:  20 mg Take 20 mg by mouth daily. Refills:  5  
     
   
   
   
  
 diclofenac 1 % Gel Commonly known as:  VOLTAREN Your last dose was: Your next dose is:    
   
   
 Apply  to affected area every eight (8) hours as needed. Quantity:  2 g Refills:  0 HumaLOG 100 unit/mL injection Generic drug:  insulin lispro Your last dose was: Your next dose is:    
   
   
 by SubCUTAneous route. Sliding scale Refills:  0  
     
   
   
   
  
 LANTUS 100 unit/mL injection Generic drug:  insulin glargine Your last dose was: Your next dose is:    
   
   
 Dose:  2 Units 2 Units by SubCUTAneous route nightly. Refills:  0  
     
   
   
   
  
 losartan 100 mg tablet Commonly known as:  COZAAR Your last dose was: Your next dose is: TAKE 1 TABLET BY MOUTH ONCE DAILY Quantity:  30 Tab Refills:  6 metoprolol tartrate 50 mg tablet Commonly known as:  LOPRESSOR Your last dose was: Your next dose is: Take  by mouth daily. Refills:  0  
     
   
   
   
  
 oxyCODONE-acetaminophen 5-325 mg per tablet Commonly known as:  PERCOCET Your last dose was: Your next dose is:    
   
   
 Dose:  1 Tab Take 1 Tab by mouth every four (4) hours as needed for Pain. Max Daily Amount: 6 Tabs. Quantity:  20 Tab Refills:  0 OXYGEN-AIR DELIVERY SYSTEMS Your last dose was: Your next dose is:    
   
   
 Dose:  3 L Take 3 L by inhalation continuous. Refills:  0  
     
   
   
   
  
 FABBY-DARLIN 0.8 mg Tab tablet Generic drug:  b complex-vitamin c-folic acid 0.8 mg Your last dose was: Your next dose is:    
   
   
 Dose:  0.8 mg Take 0.8 mg by mouth daily. Refills:  11  
     
   
   
   
  
 RENVELA 800 mg Tab tab Generic drug:  sevelamer carbonate Your last dose was: Your next dose is:    
   
   
 Dose:  800 mg Take 800 mg by mouth three (3) times daily. Patient reports medication is taken five times daily with meals and snacks Refills:  11  
     
   
   
   
  
 SYMBICORT 160-4.5 mcg/actuation HFA inhaler Generic drug:  budesonide-formoterol Your last dose was: Your next dose is:    
   
   
 Dose:  2 Puff Take 2 Puffs by inhalation two (2) times a day. Refills:  0 Where to Get Your Medications Information on where to get these meds will be given to you by the nurse or doctor. ! Ask your nurse or doctor about these medications  
  levothyroxine 50 mcg tablet Discharge Instructions For the next 12 hours you should not: 1. drive 2. drink alcohol 3. operate any machinery 4. engage in activities that require mental sharpness or manual dexterity 5. take any drugs other than those prescribed by a physician 6. make any legal or financial decisions Call your doctor's office immediately, if: 1. Vomiting blood 2. High fever 3. Severe abdominal pain Take it easy today and resume normal activity tomorrow. Resume previous diet. Trae Perez MD, [address] DISCHARGE SUMMARY from Nurse The following personal items are in your possession at time of discharge: 
 
Dental Appliances: None Visual Aid: Glasses PATIENT INSTRUCTIONS: 
 
 
F-face looks uneven A-arms unable to move or move unevenly S-speech slurred or non-existent T-time-call 911 as soon as signs and symptoms begin-DO NOT go Back to bed or wait to see if you get better-TIME IS BRAIN. Warning Signs of HEART ATTACK Call 911 if you have these symptoms: 
? Chest discomfort. Most heart attacks involve discomfort in the center of the chest that lasts more than a few minutes, or that goes away and comes back. It can feel like uncomfortable pressure, squeezing, fullness, or pain. ? Discomfort in other areas of the upper body. Symptoms can include pain or discomfort in one or both arms, the back, neck, jaw, or stomach. ? Shortness of breath with or without chest discomfort. ? Other signs may include breaking out in a cold sweat, nausea, or lightheadedness. Don't wait more than five minutes to call [address]! Fast action can save your life. Calling 911 is almost always the fastest way to get lifesaving treatment. Emergency Medical Services staff can begin treatment when they arrive  up to an hour sooner than if someone gets to the hospital by car. The discharge information has been reviewed with the patient.   The patient and parent verbalized understanding. Discharge medications reviewed with the patient and appropriate educational materials and side effects teaching were provided. Patient armband removed and given to patient to take home. Patient was informed of the privacy risks if armband lost or stolen Discharge Orders None Introducing [facility]! [address] introduces [employer] patient portal. Now you can access parts of your medical record, email your doctor's office, and request medication refills online. 1. In your internet browser, go to [email or web address] 2. Click on the First Time User? Click Here link in the Sign In box. You will see the New Member Sign Up page. 3. Enter your [employer] Access Code exactly as it appears below. You will not need to use this code after youve completed the sign-up process. If you do not sign up before the expiration date, you must request a new code. · [employer] Access Code: [email or web address] Expires: 7/1/2017  2:26 PM 
 
4. Enter the last four digits of your Social Security Number (xxxx) and Date of Birth (mm/dd/yyyy) as indicated and click Submit. You will be taken to the next sign-up page. 5. Create a ECO ID. This will be your ECO login ID and cannot be changed, so think of one that is secure and easy to remember. 6. Create a ECO password. You can change your password at any time. 7. Enter your Password Reset Question and Answer. This can be used at a later time if you forget your password. 8. Enter your e-mail address. You will receive e-mail notification when new information is available in [address] 9. Click Sign Up. You can now view and download portions of your medical record. 10. Click the Download Summary menu link to download a portable copy of your medical information.  
 
If you have questions, please visit the Frequently Asked Questions section of the [employer]. Remember, MyChart is NOT to be used for urgent needs. For medical emergencies, dial 911. Now available from your iPhone and Android! General Information Please provide this summary of care documentation to your next provider. Patient Signature:  ____________________________________________________________ Date:  ____________________________________________________________  
  
Christia Sicilian Provider Signature:  ____________________________________________________________ Date:  ____________________________________________________________

## 2017-05-04 NOTE — PROGRESS NOTES
pls let patient know cholesterol and uric acid levels were good, however, she needs a lower dose of her thyroid med-I will send to her pharmacy

## 2017-05-04 NOTE — ANESTHESIA POSTPROCEDURE EVALUATION
Post-Anesthesia Evaluation and Assessment    Patient: Yossi Flores MRN: [id number]  SSN: [id number]    YOB: 1945  Age: 66 y.o. Sex: female       Cardiovascular Function/Vital Signs  Visit Vitals    /71    Pulse 65    Temp 36.2 °C (97.2 °F)    Resp 14    Ht 4' 8\" (1.422 m)    Wt 52.2 kg (115 lb)    SpO2 100%    Breastfeeding No    BMI 25.78 kg/m2       Patient is status post MAC anesthesia for Procedure(s):  ESOPHAGOGASTRODUODENOSCOPY (EGD). Nausea/Vomiting: None    Postoperative hydration reviewed and adequate. Pain:  Pain Scale 1: Numeric (0 - 10) (05/04/17 0831)  Pain Intensity 1: 0 (05/04/17 0831)   Managed    Neurological Status: At baseline    Mental Status and Level of Consciousness: Arousable    Pulmonary Status:   O2 Device: CO2 nasal cannula (05/04/17 1037)   Adequate oxygenation and airway patent    Complications related to anesthesia: None    Post-anesthesia assessment completed.  No concerns    Signed By: Tom Patton CRNA     May 4, 2017

## 2017-05-04 NOTE — ANESTHESIA PREPROCEDURE EVALUATION
Anesthetic History               Review of Systems / Medical History  Patient summary reviewed and pertinent labs reviewed    Pulmonary    COPD    Sleep apnea    Asthma        Neuro/Psych   Within defined limits           Cardiovascular    Hypertension                   GI/Hepatic/Renal         Renal disease: dialysis and ESRD  PUD     Endo/Other    Diabetes: type 2  Hypothyroidism  Obesity     Other Findings   Comments:   Risk Factors for Postoperative nausea/vomiting:       History of postoperative nausea/vomiting? NO       Female? YES       Motion sickness? NO       Intended opioid administration for postoperative analgesia? NO  Smoking Abstinence  Current Smoker? NO  Elective Surgery? YES  Seen preoperatively by anesthesiologist or proxy prior to day of surgery? YES  Pt abstained from smoking 24 hours prior to anesthesia?  NO           Physical Exam    Airway  Mallampati: III  TM Distance: 4 - 6 cm  Neck ROM: short neck   Mouth opening: Diminished (comment)     Cardiovascular    Rhythm: irregular  Rate: normal         Dental    Dentition: Poor dentition and Loose teeth     Pulmonary      Decreased breath sounds: bilateral           Abdominal  GI exam deferred       Other Findings            Anesthetic Plan    ASA: 4  Anesthesia type: MAC            Anesthetic plan and risks discussed with: Patient

## 2017-05-04 NOTE — DISCHARGE INSTRUCTIONS
For the next 12 hours you should not:   1. drive   2. drink alcohol   3. operate any machinery   4. engage in activities that require mental sharpness or manual dexterity    5. take any drugs other than those prescribed by a physician   6. make any legal or financial decisions    Call your doctor's office immediately, if:   1. Vomiting blood   2. High fever   3. Severe abdominal pain    Take it easy today and resume normal activity tomorrow. Resume previous diet. Trae Estrada MD, FACP      DISCHARGE SUMMARY from Nurse    The following personal items are in your possession at time of discharge:    Dental Appliances: None  Visual Aid: Glasses                            PATIENT INSTRUCTIONS:    After general anesthesia or intravenous sedation, for 24 hours or while taking prescription Narcotics:  · Limit your activities  · Do not drive and operate hazardous machinery  · Do not make important personal or business decisions  · Do  not drink alcoholic beverages  · If you have not urinated within 8 hours after discharge, please contact your surgeon on call. Report the following to your surgeon:  · Excessive pain, swelling, redness or odor of or around the surgical area  · Temperature over 100.5  · Nausea and vomiting lasting longer than 4 hours or if unable to take medications  · Any signs of decreased circulation or nerve impairment to extremity: change in color, persistent  numbness, tingling, coldness or increase pain  · Any questions        What to do at Home:  Recommended activity: Activity as tolerated and no driving for today. *  Please give a list of your current medications to your Primary Care Provider. *  Please update this list whenever your medications are discontinued, doses are      changed, or new medications (including over-the-counter products) are added. *  Please carry medication information at all times in case of emergency situations.           These are general instructions for a healthy lifestyle:    No smoking/ No tobacco products/ Avoid exposure to second hand smoke    Surgeon General's Warning:  Quitting smoking now greatly reduces serious risk to your health. Obesity, smoking, and sedentary lifestyle greatly increases your risk for illness    A healthy diet, regular physical exercise & weight monitoring are important for maintaining a healthy lifestyle    You may be retaining fluid if you have a history of heart failure or if you experience any of the following symptoms:  Weight gain of 3 pounds or more overnight or 5 pounds in a week, increased swelling in our hands or feet or shortness of breath while lying flat in bed. Please call your doctor as soon as you notice any of these symptoms; do not wait until your next office visit. Recognize signs and symptoms of STROKE:    F-face looks uneven    A-arms unable to move or move unevenly    S-speech slurred or non-existent    T-time-call 911 as soon as signs and symptoms begin-DO NOT go       Back to bed or wait to see if you get better-TIME IS BRAIN. Warning Signs of HEART ATTACK     Call 911 if you have these symptoms:   Chest discomfort. Most heart attacks involve discomfort in the center of the chest that lasts more than a few minutes, or that goes away and comes back. It can feel like uncomfortable pressure, squeezing, fullness, or pain.  Discomfort in other areas of the upper body. Symptoms can include pain or discomfort in one or both arms, the back, neck, jaw, or stomach.  Shortness of breath with or without chest discomfort.  Other signs may include breaking out in a cold sweat, nausea, or lightheadedness. Don't wait more than five minutes to call 911 - MINUTES MATTER! Fast action can save your life. Calling 911 is almost always the fastest way to get lifesaving treatment.  Emergency Medical Services staff can begin treatment when they arrive -- up to an hour sooner than if someone gets to the hospital by car. The discharge information has been reviewed with the patient. The patient and parent verbalized understanding. Discharge medications reviewed with the patient and appropriate educational materials and side effects teaching were provided. Patient armband removed and given to patient to take home.   Patient was informed of the privacy risks if armband lost or stolen

## 2017-05-04 NOTE — PROCEDURES
Esophagogastroduodenoscopy (EGD) Report    Patient: Rylee Todd MRN: [id number]  SSN: [id number]    YOB: 1945  Age: 66 y.o. Sex: female      Date/Time:  5/4/2017 10:46 AM    Procedure: Esophagogastroduodenoscopy with biopsy    FINDINGS:  1. Esophagus:  Z-line was located at 38 cm. Normal.  No ulcer, mass or varices. No hiatal hernia. 2.  Stomach:  Few, shallow, < 3 mm, erosions in the prepyloric areas with moderate enlargement of prepyloric folds. No ulcer or mass. Biopsies were obtained. No varices or AVMs. 3.  Duodenum:  Few red spots in bulb. No erosions or ulcers. Normal 2nd portion. IMPRESSION:   Antral gastritis. No high risk lesion. RECOMMENDATIONS:  1. Resume home meds and diet. 2.   Can continue daily PPI for secondary prophylaxis against GI bleeding. 3.   Await results of biopsies. Instructed patient to call me in 1-2 weeks to get the results. 3.   RTO in 6 months or sooner if needed. Indication: Gastrointestinal hemorrhage, unspecified  :  Kristen Mccartney MD  Referring Provider:   Reinaldo Elizabeth MD  History: The history and physical exam were reviewed and updated. Endoscope: [id number]  Extent of Exam: second portion of the duodenum  ASA: ASA 3 - Patient with moderate systemic disease with functional limitations  Anethesia/Sedation:  MAC anesthesia Propofol    Description of the procedure: The procedure was discussed with the patient including risks, benefits, alternatives including risks of iv sedation, bleeding, perforation and aspiration. A safety timeout was performed. The patient was placed in the left lateral decubitus position. A bite block was placed. Sedation/anesthesia was administered. The patients vital signs were monitored at all times including heart rate/rhythm, blood pressure and oxygen saturation. The endoscope was then passed under direct visualization to the second portion of the duodenum.   The endoscope was then slowly withdrawn while visualizing the mucosa. In the stomach a retroflexion was performed and gastric fundus and cardia visualized. The endoscope was then slowly withdrawn. The patient was then transferred to recovery in stable condition. Therapies:  None    Specimens:   ID Type Source Tests Collected by Time Destination   1 : bx x 4 antrum r/o Hpylori and gastritis Preservative Gastric  Jackiel MD Kamar 5/4/2017 1038 Pathology              Complications:   None; patient tolerated the procedure well. EBL:Minimal      Trae Fuentes MD, [facility]  [facility]  May 4, 2017  10:46 AM

## 2017-05-04 NOTE — PERIOP NOTES
Pt glucose 75.  12.5g D50 given at 0926 per Dr. Maliha Power, anesthesia. Will recheck glucose at 0940. Pt blood pressure 203/72.  20mg hydralazine given at 0918 per Dr. Maliha Power. Will recheck BP at 0948. Potassium 8.7 on iStat, lab draw stat K per Dr. Maliha Power at 7944.

## 2017-05-04 NOTE — H&P
History and Physical    Jimena Regan  1945  [id number]  [id number]    Assessment:  UGI bleed    Treatment/Plan:  EGD    History of present illness:  66 y.o. female here for diagnostic EGD to eval recent UGI bleeding occurring in conjunction with septic shock last Nov 2016. ROS: No chest pain, shortness of breath, headaches, dizziness, lightheadedness, nausea, vomiting or abdominal pain. Evaluation of past illnesses, surgeries, or injuries:  yes    Past Medical History:   Diagnosis Date    Anemia in CKD (chronic kidney disease)     Aortic stenosis     Asthma     Calculus of kidney     Cancer ([facility]     right kidney    Cardiomyopathy ([facility]     EF 20-25% (10/16), 55% (06/16)    Chronic kidney disease     dialysis MWF    Chronic kidney disease, stage V ([facility]     Diabetes ([facility]     Dialysis patient ([facility]     Dialysis patient ([facility]     M-W-F; LEFT AVF; PERM CATH RIGHT CHEST    Gout     Hypercholesterolemia     Hypertension     Primary hyperparathyroidism ([facility] 2010    s/p parathyroid adenoma excision    Secondary hyperparathyroidism (of renal origin)     Sleep apnea     C PAP    Status post nephrectomy     right    Thyroid disease        Past Surgical History:   Procedure Laterality Date    COLONOSCOPY  08/28/2015    Repeat 5 years    HX GYN      C-sections x 3    HX HEENT      total thyroidectomy    HX NEPHRECTOMY      right    HX THYROIDECTOMY      HX VASCULAR ACCESS      dialysis catheter R upper chest    VASCULAR SURGERY PROCEDURE UNLIST      fistula LUE       Allergies: Allergies   Allergen Reactions    Ace Inhibitors Cough    Aspirin Other (comments)     Hallucinations    Hydralazine Other (comments)     Hallucinations, dizziness and numbness in legs        Previous reactions to sedation/analgesia? no     Review of current medications, supplement, herbals and nutraceuticals complete:  yes    Pertinent labs reviewed?   yes    History of active substance abuse?  no    Family History   Problem Relation Age of Onset    Hypertension Mother     Diabetes Mother     No Known Problems Father     No Known Problems Sister     No Known Problems Brother     No Known Problems Other     No Known Problems Brother     No Known Problems Brother     No Known Problems Daughter     No Known Problems Son     No Known Problems Son        Social History     Social History    Marital status:      Spouse name: N/A    Number of children: N/A    Years of education: N/A     Occupational History    Not on file. Social History Main Topics    Smoking status: Never Smoker    Smokeless tobacco: Never Used    Alcohol use No    Drug use: No    Sexual activity: Yes     Partners: Male     Other Topics Concern    Not on file     Social History Narrative       Examination:  Cardiac Status:  WNL    Mental Status:  WNL     Pulmonary Status:  WNL    NPO:  >12    Trae P.  [address] MD, [place]  5/4/2017  9:45 AM

## 2017-05-04 NOTE — PROGRESS NOTES
Colonoscopy report printed, signed by provider, and sent to scanning. Per Dr. Elian Yuan, one Tubular Adenoma,repeat in 5 years.

## 2017-05-08 ENCOUNTER — TELEPHONE (OUTPATIENT)
Dept: FAMILY MEDICINE CLINIC | Age: 72
End: 2017-05-08

## 2017-05-08 NOTE — TELEPHONE ENCOUNTER
Called and spoke with Lio Holliday. Verified two patient identifiers. Informed patient of lab results per provider. Made patient aware that her cholesterol and uric acid levels were normal but her thyroid levels were off so PCP sent a lower dose to her pharmacy. Patient verbalized understanding.

## 2017-05-30 ENCOUNTER — OFFICE VISIT (OUTPATIENT)
Dept: FAMILY MEDICINE CLINIC | Age: 72
End: 2017-05-30

## 2017-05-30 VITALS
SYSTOLIC BLOOD PRESSURE: 179 MMHG | RESPIRATION RATE: 16 BRPM | DIASTOLIC BLOOD PRESSURE: 53 MMHG | BODY MASS INDEX: 27.44 KG/M2 | WEIGHT: 122 LBS | HEART RATE: 66 BPM | OXYGEN SATURATION: 95 % | HEIGHT: 56 IN | TEMPERATURE: 97.7 F

## 2017-05-30 DIAGNOSIS — Z01.818 PRE-OP EVALUATION: ICD-10-CM

## 2017-05-30 DIAGNOSIS — Z99.2 ESRD (END STAGE RENAL DISEASE) ON DIALYSIS (HCC): ICD-10-CM

## 2017-05-30 DIAGNOSIS — H25.9 AGE-RELATED CATARACT OF BOTH EYES, UNSPECIFIED AGE-RELATED CATARACT TYPE: ICD-10-CM

## 2017-05-30 DIAGNOSIS — E03.4 HYPOTHYROIDISM DUE TO ACQUIRED ATROPHY OF THYROID: Chronic | ICD-10-CM

## 2017-05-30 DIAGNOSIS — N18.6 ESRD (END STAGE RENAL DISEASE) ON DIALYSIS (HCC): ICD-10-CM

## 2017-05-30 DIAGNOSIS — I10 ESSENTIAL HYPERTENSION: Primary | Chronic | ICD-10-CM

## 2017-05-30 RX ORDER — LEVOTHYROXINE SODIUM 50 UG/1
50 TABLET ORAL
Qty: 90 TAB | Refills: 1 | Status: SHIPPED | OUTPATIENT
Start: 2017-05-30 | End: 2019-04-23

## 2017-05-30 RX ORDER — METOPROLOL SUCCINATE 100 MG/1
100 TABLET, EXTENDED RELEASE ORAL DAILY
Qty: 30 TAB | Refills: 6 | Status: SHIPPED | OUTPATIENT
Start: 2017-05-30 | End: 2017-11-03 | Stop reason: SDUPTHER

## 2017-05-30 NOTE — MR AVS SNAPSHOT
Visit Information Date & Time Provider Department Dept. Phone Encounter #  
 5/30/2017 11:30 AM Jermaine Sanon MD [address] [id number] Follow-up Instructions Return for previous appt. Your Appointments 3/19/2018 10:30 AM  
Office Visit with Jermaine Sanon MD  
[facility]) Appt Note: mwv  
 [address]  
  
   
 [address] Upcoming Health Maintenance Date Due ZOSTER VACCINE AGE 60> 12/6/2003 EYE EXAM RETINAL OR DILATED Q1 9/28/2016 INFLUENZA AGE 9 TO ADULT 8/1/2017 GLAUCOMA SCREENING Q2Y 9/28/2017 HEMOGLOBIN A1C Q6M 11/2/2017 MICROALBUMIN Q1 1/31/2018 MEDICARE YEARLY EXAM 3/23/2018 FOOT EXAM Q1 5/2/2018 LIPID PANEL Q1 5/2/2018 BREAST CANCER SCRN MAMMOGRAM 7/15/2018 COLONOSCOPY 8/28/2020 DTaP/Tdap/Td series (2 - Td) 12/1/2026 Allergies as of 5/30/2017  Review Complete On: 5/30/2017 By: Jermaine Sanon MD  
  
 Severity Noted Reaction Type Reactions Ace Inhibitors  08/04/2015    Cough Aspirin  01/09/2013    Other (comments) Hallucinations Hydralazine  08/04/2015    Other (comments) Hallucinations, dizziness and numbness in legs Current Immunizations  Reviewed on 3/22/2017 Name Date Influenza Vaccine 9/10/2016, 10/1/2009 Pneumococcal Conjugate (PCV-13) 8/14/2016 Pneumococcal Vaccine (Unspecified Type) 4/8/2010 Tdap 12/1/2016 Not reviewed this visit You Were Diagnosed With   
  
 Codes Comments Essential hypertension    -  Primary ICD-10-CM: I10 
ICD-9-CM: 401.9 Hypothyroidism due to acquired atrophy of thyroid     ICD-10-CM: E03.4 ICD-9-CM: 244.8, 246.8 ESRD (end stage renal disease) on dialysis ([facility]     ICD-10-CM: N18.6, Z99.2 ICD-9-CM: 585.6, V45.11   
 Age-related cataract of both eyes, unspecified age-related cataract type     ICD-10-CM: H25.9 ICD-9-CM: 366.10 Pre-op evaluation     ICD-10-CM: Z86.361 ICD-9-CM: V72.84 Vitals BP Pulse Temp Resp Height(growth percentile) Weight(growth percentile) 179/53 (BP 1 Location: Left arm, BP Patient Position: Sitting) 66 97.7 °F (36.5 °C) (Oral) 16 4' 8\" (1.422 m) 122 lb (55.3 kg) SpO2 BMI OB Status Smoking Status 95% 27.35 kg/m2 Postmenopausal Never Smoker BMI and BSA Data Body Mass Index Body Surface Area  
 27.35 kg/m 2 1.48 m 2 Preferred Pharmacy Pharmacy Name Phone [id number] [place] [address] [facility] [phone] Your Updated Medication List  
  
   
This list is accurate as of: 5/30/17 12:18 PM.  Always use your most recent med list. amLODIPine 5 mg tablet Commonly known as:  [employer] Take 1 Tab by mouth daily. atorvastatin 40 mg tablet Commonly known as:  LIPITOR Take 20 mg by mouth daily. diclofenac 1 % Gel Commonly known as:  VOLTAREN Apply  to affected area every eight (8) hours as needed. HumaLOG 100 unit/mL injection Generic drug:  insulin lispro  
by SubCUTAneous route. Sliding scale LANTUS 100 unit/mL injection Generic drug:  insulin glargine 2 Units by SubCUTAneous route nightly. levothyroxine 50 mcg tablet Commonly known as:  SYNTHROID Take 1 Tab by mouth Daily (before breakfast). losartan 100 mg tablet Commonly known as:  COZAAR  
TAKE 1 TABLET BY MOUTH ONCE DAILY  
  
 metoprolol succinate 100 mg tablet Commonly known as:  TOPROL-XL Take 1 Tab by mouth daily. oxyCODONE-acetaminophen 5-325 mg per tablet Commonly known as:  PERCOCET Take 1 Tab by mouth every four (4) hours as needed for Pain. Max Daily Amount: 6 Tabs. OXYGEN-AIR DELIVERY SYSTEMS Take 3 L by inhalation continuous. FABBY-DARLIN 0.8 mg Tab tablet Generic drug:  b complex-vitamin c-folic acid 0.8 mg Take 0.8 mg by mouth daily. RENVELA 800 mg Tab tab Generic drug:  sevelamer carbonate Take 800 mg by mouth three (3) times daily. Patient reports medication is taken five times daily with meals and snacks SYMBICORT 160-4.5 mcg/actuation HFA inhaler Generic drug:  budesonide-formoterol Take 2 Puffs by inhalation two (2) times a day. Prescriptions Sent to Pharmacy Refills  
 levothyroxine (SYNTHROID) 50 mcg tablet 1 Sig: Take 1 Tab by mouth Daily (before breakfast). Class: Normal  
 Pharmacy: [address] #: [phone] Route: Oral  
 metoprolol succinate (TOPROL-XL) 100 mg tablet 6 Sig: Take 1 Tab by mouth daily. Class: Normal  
 Pharmacy: [address] #: [phone] Route: Oral  
  
Follow-up Instructions Return for previous appt. Patient Instructions Cataract Surgery: Before Your Surgery What is cataract surgery? Cataracts are cloudy areas in the lens of your eye. Your lens is behind the colored part of your eye (iris). Its job is to focus light onto the back of your eye. In some people, cataracts prevent light from reaching the back of the eye. This can cause vision problems. Cataract surgery helps you see better. It replaces your natural lens, which has become cloudy, with a clear artificial one. There are two types of cataract surgery. Phacoemulsification (say \"jovg-tj-fy-ewo-khi-luk-TARA-shun\") is the most common type. The doctor makes a small cut in your eye. This cut is called an incision. The doctor uses a special ultrasound tool to break your cloudy lens apart. Sometimes a laser is used too. Then he or she removes the small pieces of the lens through the incision. In most cases, the doctor then inserts an artificial lens through the incision.  Most people do not need stitches, because the incision is so small. If the doctor is not able to put in an artificial lens, you can wear a contact lens or thick glasses in place of your natural lens. Extracapsular extraction is a less common type of cataract surgery. The doctor makes a larger incision to remove the whole lens at once. After the doctor removes the lens, he or she stitches up the incision. Recovery from this type of surgery takes longer. Before either surgery, the doctor puts numbing drops in your eye. Some doctors use a shot instead. You may also get medicine to make you feel relaxed. You probably will not feel much pain. The surgery takes about 20 to 40 minutes. After surgery, you may have a bandage or shield on your eye. You will probably go home from surgery after 1 hour in the recovery room. Most people see better in 1 to 3 days. You may be able to go back to work or your normal routine in a few days. It could take 3 to 10 weeks for your eye to completely heal. After your eye heals, you may still need to wear glasses, especially for reading. Follow-up care is a key part of your treatment and safety. Be sure to make and go to all appointments, and call your doctor if you are having problems. It's also a good idea to know your test results and keep a list of the medicines you take. What happens before surgery? Surgery can be stressful. This information will help you understand what you can expect. And it will help you safely prepare for surgery. Preparing for surgery · Understand exactly what surgery is planned, along with the risks, benefits, and other options. · Tell your doctors ALL the medicines, vitamins, supplements, and herbal remedies you take. Some of these can increase the risk of bleeding or interact with anesthesia. · If you take blood thinners, such as warfarin (Coumadin), clopidogrel (Plavix), or aspirin, be sure to talk to your doctor.  He or she will tell you if you should stop taking these medicines before your surgery. Make sure that you understand exactly what your doctor wants you to do. · Your doctor will tell you which medicines to take or stop before your surgery. You may need to stop taking certain medicines a week or more before surgery. So talk to your doctor as soon as you can. · If you have an advance directive, let your doctor know. It may include a living will and a durable power of  for health care. Bring a copy to the hospital. If you don't have one, you may want to prepare one. It lets your doctor and loved ones know your health care wishes. Doctors advise that everyone prepare these papers before any type of surgery or procedure. What happens on the day of surgery? · Follow the instructions exactly about when to stop eating and drinking. If you don't, your surgery may be canceled. If your doctor told you to take your medicines on the day of surgery, take them with only a sip of water. · Take a bath or shower before you come in for your surgery. Do not apply lotions, perfumes, deodorants, or nail polish. · Take off all jewelry and piercings. And take out contact lenses, if you wear them. At the hospital or surgery center · Bring a picture ID. · The area for surgery is often marked to make sure there are no errors. · You will be kept comfortable and safe by your anesthesia provider. The anesthesia may make you sleep. Or it may just numb the area being worked on. · The surgery will take about 20 to 40 minutes. Going home · Be sure you have someone to drive you home. Anesthesia and pain medicine make it unsafe for you to drive. · You will be given more specific instructions about recovering from your surgery. They will cover things like diet, wound care, follow-up care, driving, and getting back to your normal routine. · You may have a bandage or patch over your eye.  You may also have a clear shield over your eye. This prevents you from rubbing it. When should you call your doctor? · You have questions or concerns. · You don't understand how to prepare for your surgery. · You become ill before the surgery (such as fever, flu, or a cold). · You need to reschedule or have changed your mind about having the surgery. Where can you learn more? Go to http://mihai-zach.info/. Enter K474 in the search box to learn more about \"Cataract Surgery: Before Your Surgery. \" Current as of: May 23, 2016 Content Version: 11.2 © 1358-7536 [email or web address]. Care instructions adapted under license by [employer] (which disclaims liability or warranty for this information). If you have questions about a medical condition or this instruction, always ask your healthcare professional. [address] any warranty or liability for your use of this information. Introducing [facility]! Colin Pankaj introduces [employer] patient portal. Now you can access parts of your medical record, email your doctor's office, and request medication refills online. 1. In your internet browser, go to [email or web address] 2. Click on the First Time User? Click Here link in the Sign In box. You will see the New Member Sign Up page. 3. Enter your [employer] Access Code exactly as it appears below. You will not need to use this code after youve completed the sign-up process. If you do not sign up before the expiration date, you must request a new code. · [employer] Access Code: [email or web address] Expires: 7/1/2017  2:26 PM 
 
4. Enter the last four digits of your Social Security Number (xxxx) and Date of Birth (mm/dd/yyyy) as indicated and click Submit. You will be taken to the next sign-up page. 5. Create a [employer] ID. This will be your [employer] login ID and cannot be changed, so think of one that is secure and easy to remember. 6. Create a Georgina Goodman password. You can change your password at any time. 7. Enter your Password Reset Question and Answer. This can be used at a later time if you forget your password. 8. Enter your e-mail address. You will receive e-mail notification when new information is available in [address] 9. Click Sign Up. You can now view and download portions of your medical record. 10. Click the Download Summary menu link to download a portable copy of your medical information. If you have questions, please visit the Frequently Asked Questions section of the [employer] website. Remember, [employer] is NOT to be used for urgent needs. For medical emergencies, dial 911. Now available from your iPhone and Android! Please provide this summary of care documentation to your next provider. Your primary care clinician is listed as Ramez Carrillo. If you have any questions after today's visit, please call [phone].

## 2017-05-30 NOTE — PROGRESS NOTES
Chief Complaint   Patient presents with    Pre-op Exam     for eye surgery        Pt is a 66y.o. year old female who presents for pre op clearance for cataract surgery next week with Dr. Wale Tapia    Current eye sxs: wears eyeglasses but still with BOV    BP Readings from Last 3 Encounters:   05/30/17 179/53   05/04/17 190/55   05/02/17 (!) 161/92   Repeat BP-still high  Meds reviewed: On Losartan and Metoprolol in the morning, Norvasc added on 5/4/2017 and she takes this at night   says BP yesterday was in the 160's at dialysis    Last Point of Care HGB A1C  Hemoglobin A1c (POC)   Date Value Ref Range Status   05/02/2017 5.3 % Final    Denies polyuria, polydipsia and polyphagia    Has not gotten the lower dose of thyroid med  Lab Results   Component Value Date/Time    TSH 0.021 05/02/2017 11:07 AM       Has dialysis 3x a week; left upper arm shunt not yet being used    Had recent EGD-told all was normal    Health Maintenance Due   Topic Date Due    ZOSTER VACCINE AGE 60>  12/06/2003-advised to get this at her pharmacy   [address] Q1  09/28/2016-done recently       ROS:    Pt denies: Wt loss, Fever/Chills, HA, Visual changes, Fatigue, Chest pain, SOB, LEBLANC, Abd pain, N/V/D/C, Blood in stool or urine, Edema. Pertinent positive as above in HPI.  All others were negative    Patient Active Problem List   Diagnosis Code    Gout M10.9    Hyperlipidemia E78.5    Tubular adenoma of colon D12.6    Anemia in chronic kidney disease N18.9, D63.1    Chronic obstructive pulmonary disease ([facility] J44.9    MELANIE on CPAP G47.33, Z99.89    Gastrointestinal hemorrhage associated with peptic ulcer K27.4    Elevated LFTs R94.5    Hyperphosphatemia E83.39    CHF (congestive heart failure) ([facility]) I50.9    Type 2 diabetes mellitus with hyperglycemia, with long-term current use of insulin ([facility]) E11.65, Z79.4    Advance directive discussed with patient Z70.80    Essential hypertension I10    Hypothyroidism due to acquired atrophy of thyroid E03.4    ESRD (end stage renal disease) on dialysis ([facility] N18.6, Z99.2       Past Medical History:   Diagnosis Date    Anemia in CKD (chronic kidney disease)     Aortic stenosis     Asthma     Calculus of kidney     [facility]     right kidney    Cardiomyopathy [facility]     EF 20-25% (10/16), 55% (06/16)    Chronic kidney disease     dialysis MWF    Chronic kidney disease, stage V ([facility]     Diabetes ([facility]     Dialysis patient ([facility]     Dialysis patient ([facility]     M-W-F; LEFT AVF; PERM CATH RIGHT CHEST    Gastrointestinal hemorrhage associated with peptic ulcer     Gout     Hypercholesterolemia     Hypertension     Primary hyperparathyroidism ([facility] 2010    s/p parathyroid adenoma excision    Secondary hyperparathyroidism (of renal origin)     Sleep apnea     C PAP    Status post nephrectomy     right    Thyroid disease        Current Outpatient Prescriptions   Medication Sig Dispense Refill    levothyroxine (SYNTHROID) 50 mcg tablet Take 1 Tab by mouth Daily (before breakfast). 90 Tab 1    metoprolol succinate (TOPROL-XL) 100 mg tablet Take 1 Tab by mouth daily. 30 Tab 6    amLODIPine (NORVASC) 5 mg tablet Take 1 Tab by mouth daily. 30 Tab 6    losartan (COZAAR) 100 mg tablet TAKE 1 TABLET BY MOUTH ONCE DAILY 30 Tab 6    RENVELA 800 mg tab tab Take 800 mg by mouth three (3) times daily. Patient reports medication is taken five times daily with meals and snacks  11    FABBY-DARLIN 0.8 mg tab tablet Take 0.8 mg by mouth daily. 11    OXYGEN-AIR DELIVERY SYSTEMS Take 3 L by inhalation continuous.  atorvastatin (LIPITOR) 40 mg tablet Take 20 mg by mouth daily. 5    insulin lispro (HUMALOG) 100 unit/mL injection by SubCUTAneous route. Sliding scale       budesonide-formoterol (SYMBICORT) 160-4.5 mcg/actuation HFA inhaler Take 2 Puffs by inhalation two (2) times a day.         insulin glargine (LANTUS) 100 unit/mL injection 2 Units by SubCUTAneous route nightly.  diclofenac (VOLTAREN) 1 % gel Apply  to affected area every eight (8) hours as needed. 2 g 0    oxyCODONE-acetaminophen (PERCOCET) 5-325 mg per tablet Take 1 Tab by mouth every four (4) hours as needed for Pain. Max Daily Amount: 6 Tabs. 20 Tab 0       History   Smoking Status    Never Smoker   Smokeless Tobacco    Never Used       Allergies   Allergen Reactions    Ace Inhibitors Cough    Aspirin Other (comments)     Hallucinations    Hydralazine Other (comments)     Hallucinations, dizziness and numbness in legs        Patient Labs were reviewed: yes      Patient Past Records were reviewed:  yes        Objective:     Vitals:    05/30/17 1136   BP: 179/53   Pulse: 66   Resp: 16   Temp: 97.7 °F (36.5 °C)   TempSrc: Oral   SpO2: 95%   Weight: 122 lb (55.3 kg)   Height: 4' 8\" (1.422 m)     Body mass index is 27.35 kg/(m^2). Exam:   Appearance: alert, well appearing,  oriented to person, place, and time, acyanotic, in no respiratory distress and well hydrated. HEENT:  NC/AT, pink conj, anicteric sclerae, bilateral lens opacity  Neck:  No cervical lymphadenopathy, no JVD, no thyromegaly, no carotid bruit  Heart:  RRR without M/R/G  Lungs:  CTAB, no rhonchi, rales, or wheezes with good air exchange   Abdomen:  Non-tender, pos bowel sounds, no hepatosplenomegaly  Ext:  No C/C/E, shunt on the left UE    Skin: no rash  Neuro: no lateralizing signs, CNs II-XII intact      Assessment/ Plan:   Reuben Bonilla was seen today for pre-op exam.    Diagnoses and all orders for this visit:    Essential hypertension-uncontrolled, will increase dose of Toprol XL from 50 mg to 100 mg  -     metoprolol succinate (TOPROL-XL) 100 mg tablet; Take 1 Tab by mouth daily. Hypothyroidism due to acquired atrophy of thyroid-will re-send lower dose of Synthroid  -     levothyroxine (SYNTHROID) 50 mcg tablet; Take 1 Tab by mouth Daily (before breakfast).     ESRD (end stage renal disease) on hemodialysis ([facility]    DM, II-controlled-off Lantus    Age-related cataract of both eyes, unspecified age-related cataract type-symptomatic, worsening vision    Pre-op evaluation-patient is medically cleared for cataract surgery        Follow-up Disposition:  Return for previous appt. I have discussed the diagnosis with the patient and the intended plan as seen in the above orders. The patient has received an After-Visit Summary and questions were answered concerning future plans. Medication Side Effects and Warnings were discussed with patient: yes    Patient verbalized understanding of above instructions.     Yoshi Sheridan MD  Internal Medicine  [facility]

## 2017-05-30 NOTE — PROGRESS NOTES
1. Have you been to the ER, urgent care clinic since your last visit? Hospitalized since your last visit? No    2. Have you seen or consulted any other health care providers outside of the [address] [place] [address] since your last visit? Include any pap smears or colon screening.  No       Pt here today for pre -op for eye surgery scheduled for 06-8-2017

## 2017-06-01 ENCOUNTER — TELEPHONE (OUTPATIENT)
Dept: FAMILY MEDICINE CLINIC | Age: 72
End: 2017-06-01

## 2017-06-01 DIAGNOSIS — E11.65 TYPE 2 DIABETES MELLITUS WITH HYPERGLYCEMIA, WITH LONG-TERM CURRENT USE OF INSULIN (HCC): Primary | ICD-10-CM

## 2017-06-01 DIAGNOSIS — Z79.4 TYPE 2 DIABETES MELLITUS WITH HYPERGLYCEMIA, WITH LONG-TERM CURRENT USE OF INSULIN (HCC): Primary | ICD-10-CM

## 2017-06-01 NOTE — TELEPHONE ENCOUNTER
Dr Nicko Lara,    Patient's  walked in asking for a humana referral for Dr Jet Lennon at [facility], but the last one I see in EPIC is from 2014. If you can please enter a new EPIC referral, I would be happy to obtain her Alvira Collet. HER APPOINTMENT WITH THIS PROVIDER IS TODAY!       Thank you,    Roderick Cool

## 2017-06-01 NOTE — TELEPHONE ENCOUNTER
Entered Verenice Berry (#[phone]) and faxed it to [facility] with confirmation received.     Yudith Mckeon

## 2017-06-05 ENCOUNTER — TELEPHONE (OUTPATIENT)
Dept: FAMILY MEDICINE CLINIC | Age: 72
End: 2017-06-05

## 2017-06-05 NOTE — TELEPHONE ENCOUNTER
Oneyda henry from [facility] eye, would appreciate the pre op that was done be faxed back to them at [phone]

## 2017-06-05 NOTE — TELEPHONE ENCOUNTER
Siri Pro Pre-op  Visit not was faxed over to man at [facility] 1949 [facility] on 06- at 1:00 pm.      Lia Buchanan

## 2017-06-30 ENCOUNTER — TELEPHONE (OUTPATIENT)
Dept: FAMILY MEDICINE CLINIC | Age: 72
End: 2017-06-30

## 2017-06-30 DIAGNOSIS — J44.9 CHRONIC OBSTRUCTIVE PULMONARY DISEASE, UNSPECIFIED COPD TYPE (HCC): Primary | Chronic | ICD-10-CM

## 2017-06-30 NOTE — TELEPHONE ENCOUNTER
Patient is requesting a referral to see Michelle Estrada at [facility] .. ... Phone number:  [phone] and fax [phone]. Please contact patient. ..

## 2017-07-17 ENCOUNTER — ANESTHESIA EVENT (OUTPATIENT)
Dept: SURGERY | Age: 72
End: 2017-07-17
Payer: MEDICARE

## 2017-07-17 NOTE — PERIOP NOTES
Told Dr Jessie Morales that thee is no latest cardiiac consult. The last consult was 06/16,According to PCP notes last 05/30/17,the EF 06/16-20-25%,11/06/16 55%. Dr Jessie Morales talked to Dr Patti Samson and Dr Patti Samson said per their conversation that he does not cancel the patient for not having cardiac clearance for av graft patients.

## 2017-07-18 ENCOUNTER — ANESTHESIA (OUTPATIENT)
Dept: SURGERY | Age: 72
End: 2017-07-18
Payer: MEDICARE

## 2017-07-18 ENCOUNTER — HOSPITAL ENCOUNTER (OUTPATIENT)
Age: 72
Setting detail: OUTPATIENT SURGERY
Discharge: HOME | End: 2017-07-18
Attending: SURGERY | Admitting: SURGERY
Payer: MEDICARE

## 2017-07-18 VITALS
SYSTOLIC BLOOD PRESSURE: 146 MMHG | WEIGHT: 130 LBS | HEIGHT: 56 IN | TEMPERATURE: 98.4 F | DIASTOLIC BLOOD PRESSURE: 51 MMHG | HEART RATE: 54 BPM | RESPIRATION RATE: 16 BRPM | OXYGEN SATURATION: 92 % | BODY MASS INDEX: 29.25 KG/M2

## 2017-07-18 LAB
ANION GAP BLD CALC-SCNC: 10 MMOL/L (ref 3–18)
ATRIAL RATE: 53 BPM
BUN SERPL-MCNC: 38 MG/DL (ref 7–18)
BUN/CREAT SERPL: 7 (ref 12–20)
CALCIUM SERPL-MCNC: 9.4 MG/DL (ref 8.5–10.1)
CALCULATED P AXIS, ECG09: 62 DEGREES
CALCULATED R AXIS, ECG10: -49 DEGREES
CALCULATED T AXIS, ECG11: 43 DEGREES
CHLORIDE SERPL-SCNC: 103 MMOL/L (ref 100–108)
CO2 SERPL-SCNC: 25 MMOL/L (ref 21–32)
CREAT SERPL-MCNC: 5.62 MG/DL (ref 0.6–1.3)
DIAGNOSIS, 93000: NORMAL
ERYTHROCYTE [DISTWIDTH] IN BLOOD BY AUTOMATED COUNT: 16.3 % (ref 11.6–14.5)
GLUCOSE BLD STRIP.AUTO-MCNC: 89 MG/DL (ref 70–110)
GLUCOSE SERPL-MCNC: 88 MG/DL (ref 74–99)
HCT VFR BLD AUTO: 35.2 % (ref 35–45)
HGB BLD-MCNC: 11.5 G/DL (ref 12–16)
MCH RBC QN AUTO: 29.5 PG (ref 24–34)
MCHC RBC AUTO-ENTMCNC: 32.7 G/DL (ref 31–37)
MCV RBC AUTO: 90.3 FL (ref 74–97)
P-R INTERVAL, ECG05: 162 MS
PLATELET # BLD AUTO: 93 K/UL (ref 135–420)
PMV BLD AUTO: 9.3 FL (ref 9.2–11.8)
POTASSIUM SERPL-SCNC: 5.2 MMOL/L (ref 3.5–5.5)
Q-T INTERVAL, ECG07: 488 MS
QRS DURATION, ECG06: 124 MS
QTC CALCULATION (BEZET), ECG08: 457 MS
RBC # BLD AUTO: 3.9 M/UL (ref 4.2–5.3)
SODIUM SERPL-SCNC: 138 MMOL/L (ref 136–145)
VENTRICULAR RATE, ECG03: 53 BPM
WBC # BLD AUTO: 3.2 K/UL (ref 4.6–13.2)

## 2017-07-18 PROCEDURE — 82962 GLUCOSE BLOOD TEST: CPT

## 2017-07-18 PROCEDURE — 77030002996 HC SUT SLK J&J -A: Performed by: SURGERY

## 2017-07-18 PROCEDURE — 74011250636 HC RX REV CODE- 250/636

## 2017-07-18 PROCEDURE — 77030011265 HC ELECTRD BLD HEX COVD -A: Performed by: SURGERY

## 2017-07-18 PROCEDURE — 76010000153 HC OR TIME 1.5 TO 2 HR: Performed by: SURGERY

## 2017-07-18 PROCEDURE — 74011250636 HC RX REV CODE- 250/636: Performed by: ANESTHESIOLOGY

## 2017-07-18 PROCEDURE — 85027 COMPLETE CBC AUTOMATED: CPT

## 2017-07-18 PROCEDURE — 77030010507 HC ADH SKN DERMBND J&J -B: Performed by: SURGERY

## 2017-07-18 PROCEDURE — 93005 ELECTROCARDIOGRAM TRACING: CPT

## 2017-07-18 PROCEDURE — 74011000250 HC RX REV CODE- 250: Performed by: SURGERY

## 2017-07-18 PROCEDURE — 76210000021 HC REC RM PH II 0.5 TO 1 HR: Performed by: SURGERY

## 2017-07-18 PROCEDURE — 77030020256 HC SOL INJ NACL 0.9%  500ML: Performed by: SURGERY

## 2017-07-18 PROCEDURE — 77030013079 HC BLNKT BAIR HGGR 3M -A: Performed by: ANESTHESIOLOGY

## 2017-07-18 PROCEDURE — C1768 GRAFT, VASCULAR: HCPCS | Performed by: SURGERY

## 2017-07-18 PROCEDURE — 74011250636 HC RX REV CODE- 250/636: Performed by: SURGERY

## 2017-07-18 PROCEDURE — 77030018846 HC SOL IRR STRL H20 ICUM -A: Performed by: SURGERY

## 2017-07-18 PROCEDURE — 77030012510 HC MSK AIRWY LMA TELE -B: Performed by: ANESTHESIOLOGY

## 2017-07-18 PROCEDURE — 76060000034 HC ANESTHESIA 1.5 TO 2 HR: Performed by: SURGERY

## 2017-07-18 PROCEDURE — 77030018836 HC SOL IRR NACL ICUM -A: Performed by: SURGERY

## 2017-07-18 PROCEDURE — 77030032490 HC SLV COMPR SCD KNE COVD -B: Performed by: SURGERY

## 2017-07-18 PROCEDURE — 77030011640 HC PAD GRND REM COVD -A: Performed by: SURGERY

## 2017-07-18 PROCEDURE — 76210000000 HC OR PH I REC 2 TO 2.5 HR: Performed by: SURGERY

## 2017-07-18 PROCEDURE — 80048 BASIC METABOLIC PNL TOTAL CA: CPT

## 2017-07-18 PROCEDURE — 74011000272 HC RX REV CODE- 272: Performed by: SURGERY

## 2017-07-18 DEVICE — GORE ACUSEAL VASCULAR GRAFT 4-7MMX45CM TAPERED HEPARIN
Type: IMPLANTABLE DEVICE | Site: ARM | Status: FUNCTIONAL
Brand: GORE ACUSEAL VASCULAR GRAFT

## 2017-07-18 RX ORDER — MAGNESIUM SULFATE 100 %
4 CRYSTALS MISCELLANEOUS AS NEEDED
Status: DISCONTINUED | OUTPATIENT
Start: 2017-07-18 | End: 2017-07-18 | Stop reason: HOSPADM

## 2017-07-18 RX ORDER — ONDANSETRON 2 MG/ML
4 INJECTION INTRAMUSCULAR; INTRAVENOUS ONCE
Status: DISCONTINUED | OUTPATIENT
Start: 2017-07-18 | End: 2017-07-18 | Stop reason: HOSPADM

## 2017-07-18 RX ORDER — FAMOTIDINE 20 MG/1
TABLET, FILM COATED ORAL
Status: DISCONTINUED
Start: 2017-07-18 | End: 2017-07-18 | Stop reason: HOSPADM

## 2017-07-18 RX ORDER — MIDAZOLAM HYDROCHLORIDE 1 MG/ML
INJECTION, SOLUTION INTRAMUSCULAR; INTRAVENOUS
Status: DISCONTINUED
Start: 2017-07-18 | End: 2017-07-18 | Stop reason: HOSPADM

## 2017-07-18 RX ORDER — HEPARIN SODIUM 1000 [USP'U]/ML
2100 INJECTION, SOLUTION INTRAVENOUS; SUBCUTANEOUS ONCE
Status: DISCONTINUED | OUTPATIENT
Start: 2017-07-18 | End: 2017-07-18 | Stop reason: HOSPADM

## 2017-07-18 RX ORDER — SODIUM CHLORIDE 0.9 % (FLUSH) 0.9 %
5-10 SYRINGE (ML) INJECTION EVERY 8 HOURS
Status: DISCONTINUED | OUTPATIENT
Start: 2017-07-18 | End: 2017-07-18 | Stop reason: HOSPADM

## 2017-07-18 RX ORDER — NALOXONE HYDROCHLORIDE 0.4 MG/ML
0.04 INJECTION, SOLUTION INTRAMUSCULAR; INTRAVENOUS; SUBCUTANEOUS AS NEEDED
Status: DISCONTINUED | OUTPATIENT
Start: 2017-07-18 | End: 2017-07-18 | Stop reason: HOSPADM

## 2017-07-18 RX ORDER — OXYCODONE AND ACETAMINOPHEN 5; 325 MG/1; MG/1
1 TABLET ORAL
Qty: 20 TAB | Refills: 0 | Status: SHIPPED | OUTPATIENT
Start: 2017-07-18 | End: 2017-07-18

## 2017-07-18 RX ORDER — ATROPINE SULFATE 0.1 MG/ML
INJECTION INTRAVENOUS
Status: COMPLETED
Start: 2017-07-18 | End: 2017-07-18

## 2017-07-18 RX ORDER — OXYCODONE AND ACETAMINOPHEN 5; 325 MG/1; MG/1
1 TABLET ORAL ONCE
Status: DISCONTINUED | OUTPATIENT
Start: 2017-07-18 | End: 2017-07-18 | Stop reason: HOSPADM

## 2017-07-18 RX ORDER — INSULIN LISPRO 100 [IU]/ML
INJECTION, SOLUTION INTRAVENOUS; SUBCUTANEOUS ONCE
Status: DISCONTINUED | OUTPATIENT
Start: 2017-07-18 | End: 2017-07-18 | Stop reason: HOSPADM

## 2017-07-18 RX ORDER — FENTANYL CITRATE 50 UG/ML
50 INJECTION, SOLUTION INTRAMUSCULAR; INTRAVENOUS AS NEEDED
Status: DISCONTINUED | OUTPATIENT
Start: 2017-07-18 | End: 2017-07-18 | Stop reason: HOSPADM

## 2017-07-18 RX ORDER — CEFAZOLIN SODIUM 2 G/50ML
2 SOLUTION INTRAVENOUS
Status: COMPLETED | OUTPATIENT
Start: 2017-07-18 | End: 2017-07-18

## 2017-07-18 RX ORDER — FENTANYL CITRATE 50 UG/ML
INJECTION, SOLUTION INTRAMUSCULAR; INTRAVENOUS
Status: COMPLETED
Start: 2017-07-18 | End: 2017-07-18

## 2017-07-18 RX ORDER — LABETALOL HYDROCHLORIDE 5 MG/ML
5-20 INJECTION, SOLUTION INTRAVENOUS AS NEEDED
Status: DISCONTINUED | OUTPATIENT
Start: 2017-07-18 | End: 2017-07-18 | Stop reason: HOSPADM

## 2017-07-18 RX ORDER — SODIUM CHLORIDE, SODIUM LACTATE, POTASSIUM CHLORIDE, CALCIUM CHLORIDE 600; 310; 30; 20 MG/100ML; MG/100ML; MG/100ML; MG/100ML
50 INJECTION, SOLUTION INTRAVENOUS CONTINUOUS
Status: DISCONTINUED | OUTPATIENT
Start: 2017-07-19 | End: 2017-07-18

## 2017-07-18 RX ORDER — SODIUM CHLORIDE 0.9 % (FLUSH) 0.9 %
5-10 SYRINGE (ML) INJECTION AS NEEDED
Status: DISCONTINUED | OUTPATIENT
Start: 2017-07-18 | End: 2017-07-18 | Stop reason: HOSPADM

## 2017-07-18 RX ORDER — OXYCODONE AND ACETAMINOPHEN 5; 325 MG/1; MG/1
1 TABLET ORAL
Qty: 20 TAB | Refills: 0 | Status: SHIPPED | OUTPATIENT
Start: 2017-07-18 | End: 2017-10-05

## 2017-07-18 RX ORDER — FENTANYL CITRATE 50 UG/ML
50 INJECTION, SOLUTION INTRAMUSCULAR; INTRAVENOUS
Status: DISCONTINUED | OUTPATIENT
Start: 2017-07-18 | End: 2017-07-18 | Stop reason: HOSPADM

## 2017-07-18 RX ORDER — MIDAZOLAM HYDROCHLORIDE 1 MG/ML
2 INJECTION, SOLUTION INTRAMUSCULAR; INTRAVENOUS ONCE
Status: COMPLETED | OUTPATIENT
Start: 2017-07-18 | End: 2017-07-18

## 2017-07-18 RX ORDER — INSULIN LISPRO 100 [IU]/ML
INJECTION, SOLUTION INTRAVENOUS; SUBCUTANEOUS ONCE
Status: DISCONTINUED | OUTPATIENT
Start: 2017-07-19 | End: 2017-07-18 | Stop reason: HOSPADM

## 2017-07-18 RX ORDER — DEXTROSE 50 % IN WATER (D50W) INTRAVENOUS SYRINGE
25-50 AS NEEDED
Status: DISCONTINUED | OUTPATIENT
Start: 2017-07-18 | End: 2017-07-18 | Stop reason: HOSPADM

## 2017-07-18 RX ORDER — ONDANSETRON 2 MG/ML
INJECTION INTRAMUSCULAR; INTRAVENOUS AS NEEDED
Status: DISCONTINUED | OUTPATIENT
Start: 2017-07-18 | End: 2017-07-18 | Stop reason: HOSPADM

## 2017-07-18 RX ORDER — HYDROMORPHONE HYDROCHLORIDE 2 MG/ML
0.5 INJECTION, SOLUTION INTRAMUSCULAR; INTRAVENOUS; SUBCUTANEOUS
Status: DISCONTINUED | OUTPATIENT
Start: 2017-07-18 | End: 2017-07-18 | Stop reason: HOSPADM

## 2017-07-18 RX ORDER — SODIUM CHLORIDE, SODIUM LACTATE, POTASSIUM CHLORIDE, CALCIUM CHLORIDE 600; 310; 30; 20 MG/100ML; MG/100ML; MG/100ML; MG/100ML
125 INJECTION, SOLUTION INTRAVENOUS CONTINUOUS
Status: DISCONTINUED | OUTPATIENT
Start: 2017-07-18 | End: 2017-07-18 | Stop reason: HOSPADM

## 2017-07-18 RX ORDER — HEPARIN SODIUM 1000 [USP'U]/ML
INJECTION, SOLUTION INTRAVENOUS; SUBCUTANEOUS AS NEEDED
Status: DISCONTINUED | OUTPATIENT
Start: 2017-07-18 | End: 2017-07-18 | Stop reason: HOSPADM

## 2017-07-18 RX ORDER — FAMOTIDINE 20 MG/1
20 TABLET, FILM COATED ORAL ONCE
Status: DISCONTINUED | OUTPATIENT
Start: 2017-07-19 | End: 2017-07-18 | Stop reason: HOSPADM

## 2017-07-18 RX ORDER — ATROPINE SULFATE 0.1 MG/ML
0.4 INJECTION INTRAVENOUS ONCE
Status: COMPLETED | OUTPATIENT
Start: 2017-07-18 | End: 2017-07-18

## 2017-07-18 RX ORDER — SODIUM CHLORIDE 9 MG/ML
500 INJECTION, SOLUTION INTRAVENOUS CONTINUOUS
Status: DISCONTINUED | OUTPATIENT
Start: 2017-07-18 | End: 2017-07-18 | Stop reason: HOSPADM

## 2017-07-18 RX ORDER — PROTAMINE SULFATE 10 MG/ML
INJECTION, SOLUTION INTRAVENOUS AS NEEDED
Status: DISCONTINUED | OUTPATIENT
Start: 2017-07-18 | End: 2017-07-18 | Stop reason: HOSPADM

## 2017-07-18 RX ADMIN — FENTANYL CITRATE 25 MCG: 50 INJECTION, SOLUTION INTRAMUSCULAR; INTRAVENOUS at 15:35

## 2017-07-18 RX ADMIN — ATROPINE SULFATE 0.4 MG: 0.1 INJECTION INTRAVENOUS at 16:00

## 2017-07-18 RX ADMIN — FENTANYL CITRATE 25 MCG: 50 INJECTION, SOLUTION INTRAMUSCULAR; INTRAVENOUS at 15:45

## 2017-07-18 RX ADMIN — MIDAZOLAM HYDROCHLORIDE 2 MG: 1 INJECTION, SOLUTION INTRAMUSCULAR; INTRAVENOUS at 12:24

## 2017-07-18 RX ADMIN — ATROPINE SULFATE 0.4 MG: 0.1 INJECTION, SOLUTION INTRAVENOUS at 16:00

## 2017-07-18 RX ADMIN — SODIUM CHLORIDE 1000 ML: 900 INJECTION, SOLUTION INTRAVENOUS at 12:25

## 2017-07-18 RX ADMIN — CEFAZOLIN SODIUM 2 G: 2 SOLUTION INTRAVENOUS at 13:47

## 2017-07-18 RX ADMIN — HEPARIN SODIUM 3000 UNITS: 1000 INJECTION, SOLUTION INTRAVENOUS; SUBCUTANEOUS at 14:12

## 2017-07-18 RX ADMIN — ONDANSETRON 4 MG: 2 INJECTION INTRAMUSCULAR; INTRAVENOUS at 14:56

## 2017-07-18 RX ADMIN — PROTAMINE SULFATE 20 MG: 10 INJECTION, SOLUTION INTRAVENOUS at 14:48

## 2017-07-18 RX ADMIN — FENTANYL CITRATE 50 MCG: 50 INJECTION, SOLUTION INTRAMUSCULAR; INTRAVENOUS at 12:25

## 2017-07-18 NOTE — DISCHARGE INSTRUCTIONS
Hemodialysis Access: What to Expect at [facility]  Hemodialysis is a way to remove wastes from the blood when your kidneys can no longer do the job. It is not a cure, but it can help you live longer and feel better. It is a lifesaving treatment when you have kidney failure. Hemodialysis is often called dialysis. Your doctor created a place (called an access) in your arm for your blood to flow in and out of your body during your dialysis sessions. Your arm will probably be bruised and swollen. It may hurt. The cut (incision) may bleed. The pain and bleeding will get better over several days. You will probably need only over-the-counter pain medicine. You can reduce swelling by propping your arm on 1 or 2 pillows and keeping your elbow straight. You will have stitches. These may dissolve on their own, or your doctor will tell you when to come in to have them removed. You should also be able to return to work in a few days. You may feel some coolness or numbness in your hand. These feelings usually go away in a few weeks. Your doctor may suggest squeezing a soft object. This will strengthen your access and may make hemodialysis faster and easier. You should always be able to feel blood rushing through the fistula or graft. It feels like a slight vibration when you put your fingers on the skin over the fistula or graft. This feeling is called a thrill or pulse. This care sheet gives you a general idea about how long it will take for you to recover. But each person recovers at a different pace. Follow the steps below to get better as quickly as possible. How can you care for yourself at home? Activity  · Rest when you feel tired. Getting enough sleep will help you recover. Do not lie on or sleep on the arm with the access. · Avoid activities such as washing windows or gardening that put stress on the arm with the access.   · You may use your arm, but do not lift anything that weighs more than about 15 pounds. This may include a child, heavy grocery bags, a heavy briefcase or backpack, cat litter or dog food bags, or a vacuum . · You can shower, but keep the access dry for the first 2 days. Cover the area with a plastic bag to keep it dry. · Do not soak or scrub the incision until it has healed. · Wear an arm guard to protect the area if you play sports or work with your arms. · You may drive when your doctor says it is okay. This is usually in 1 to 2 days. · Most people are able to return to work about 1 or 2 days after surgery. Diet  · Follow an eating plan that is good for your kidneys. A registered dietitian can help you make a meal plan that is right for you. You may need to limit protein, salt, fluids, and certain foods. Medicines  · Your doctor will tell you if and when you can restart your medicines. He or she will also give you instructions about taking any new medicines. · If you take blood thinners, such as warfarin (Coumadin), clopidogrel (Plavix), or aspirin, be sure to talk to your doctor. He or she will tell you if and when to start taking those medicines again. Make sure that you understand exactly what your doctor wants you to do. · Take pain medicines exactly as directed. ¨ If the doctor gave you a prescription medicine for pain, take it as prescribed. ¨ If you are not taking a prescription pain medicine, ask your doctor if you can take acetaminophen (Tylenol). Do not take ibuprofen (Advil, Motrin) or naproxen (Aleve), or similar medicines, unless your doctor tells you to. They may make chronic kidney disease worse. ¨ Do not take two or more pain medicines at the same time unless the doctor told you to. Many pain medicines have acetaminophen, which is Tylenol. Too much acetaminophen (Tylenol) can be harmful. · If you think your pain medicine is making you sick to your stomach:  ¨ Take your medicine after meals (unless your doctor has told you not to).   ¨ Ask your doctor for a different pain medicine. · If your doctor prescribed antibiotics, take them as directed. Do not stop taking them just because you feel better. You need to take the full course of antibiotics. Incision care  · Keep the area dry for 2 days. After 2 days, wash the area with soap and water every day, and always before dialysis. · Do not soak or scrub the incision until it has healed. · If you have a bandage, change it every day or as your doctor recommends. Your doctor will tell you when you can remove it. Exercise  · Squeeze a soft ball or other object as your doctor tells you. This will help blood flow through the access and help prevent blood clots. Elevation  · Prop up the sore arm on a pillow anytime you sit or lie down during the next 3 days. Try to keep it above the level of your heart. This will help reduce swelling. Other instructions  · Every day, check your access for a pulse or thrill in the fistula or graft area. A thrill is a vibration. To feel a pulse or thrill, place the first two fingers of your hand over the access. · Do not bump your arm. · Do not wear tight clothing, jewelry, or anything else that may squeeze the access. · Use your other arm to have blood drawn or blood pressure taken. · Do not put cream or lotion on or near the access. · Make sure all doctors you deal with know you have a vascular access. Follow-up care is a key part of your treatment and safety. Be sure to make and go to all appointments, and call your doctor if you are having problems. It's also a good idea to know your test results and keep a list of the medicines you take. When should you call for help? Call 911 anytime you think you may need emergency care. For example, call if:  · You passed out (lost consciousness). · You have severe trouble breathing. · You have sudden chest pain and shortness of breath, or you cough up blood. · You have a rapid pulse or heartbeat.   Call your doctor now or seek immediate medical care if:  · Your hand or arm is cold or dark-colored. · You have sudden bulging around your access. · You have no pulse or thrill in your access, or you hear no sound of blood in your access. · Bleeding after dialysis lasts longer than usual.  · You have severe pain that does not get better after you take pain medicine. · You have a fever over 100°F.  · You have loose stitches, or your incision comes open. · You are bleeding from the incision more than you had been. · You have signs of infection, such as:  ¨ Increased pain, swelling, warmth, or redness. ¨ Red streaks leading from the incision. ¨ Pus draining from the incision. ¨ Swollen lymph nodes in your neck, armpits, or groin. ¨ A fever. Watch closely for changes in your health, and be sure to contact your doctor if you have any problems. Where can you learn more? Go to http://mihai-zach.info/. Enter P616 in the search box to learn more about \"Hemodialysis Access: What to Expect at Home. \"  Current as of: November 16, 2016  Content Version: 11.3  © 6851-3851 [employer]. Care instructions adapted under license by [employer] (which disclaims liability or warranty for this information). If you have questions about a medical condition or this instruction, always ask your healthcare professional. [address] any warranty or liability for your use of this information.         DISCHARGE SUMMARY from Nurse    The following personal items are in your possession at time of discharge:    Dental Appliances: None  Visual Aid: Glasses                            PATIENT INSTRUCTIONS:    After general anesthesia or intravenous sedation, for 24 hours or while taking prescription Narcotics:  · Limit your activities  · Do not drive and operate hazardous machinery  · Do not make important personal or business decisions  · Do  not drink alcoholic beverages  · If you have not urinated within 8 hours after discharge, please contact your surgeon on call. Report the following to your surgeon:  · Excessive pain, swelling, redness or odor of or around the surgical area  · Temperature over 100.5  · Nausea and vomiting lasting longer than 4 hours or if unable to take medications  · Any signs of decreased circulation or nerve impairment to extremity: change in color, persistent  numbness, tingling, coldness or increase pain  · Any questions        What to do at Home:  These are general instructions for a healthy lifestyle:    No smoking/ No tobacco products/ Avoid exposure to second hand smoke    Surgeon General's Warning:  Quitting smoking now greatly reduces serious risk to your health. Obesity, smoking, and sedentary lifestyle greatly increases your risk for illness    A healthy diet, regular physical exercise & weight monitoring are important for maintaining a healthy lifestyle    You may be retaining fluid if you have a history of heart failure or if you experience any of the following symptoms:  Weight gain of 3 pounds or more overnight or 5 pounds in a week, increased swelling in our hands or feet or shortness of breath while lying flat in bed. Please call your doctor as soon as you notice any of these symptoms; do not wait until your next office visit. Recognize signs and symptoms of STROKE:    F-face looks uneven    A-arms unable to move or move unevenly    S-speech slurred or non-existent    T-time-call 911 as soon as signs and symptoms begin-DO NOT go       Back to bed or wait to see if you get better-TIME IS BRAIN. Warning Signs of HEART ATTACK     Call 911 if you have these symptoms:   Chest discomfort. Most heart attacks involve discomfort in the center of the chest that lasts more than a few minutes, or that goes away and comes back. It can feel like uncomfortable pressure, squeezing, fullness, or pain.  Discomfort in other areas of the upper body.  Symptoms can include pain or discomfort in one or both arms, the back, neck, jaw, or stomach.  Shortness of breath with or without chest discomfort.  Other signs may include breaking out in a cold sweat, nausea, or lightheadedness. Don't wait more than five minutes to call 911 - MINUTES MATTER! Fast action can save your life. Calling 911 is almost always the fastest way to get lifesaving treatment. Emergency Medical Services staff can begin treatment when they arrive -- up to an hour sooner than if someone gets to the hospital by car. The discharge information has been reviewed with the patient. The patient verbalized understanding. Discharge medications reviewed with the patient and appropriate educational materials and side effects teaching were provided. Patient armband removed and given to patient to take home.   Patient was informed of the privacy risks if armband lost or stolen

## 2017-07-18 NOTE — H&P
[facility]  [facility] [address] [facility], [address] [place] [address]  [address], [place]  [address] [place] [address], [address] [place] [address]  Dr. Ammy Castaneda, Dr. Bertin Mohan  [phone] FAX# [phone]    History and Physical    Patient: Ale Mays MRN: [id number]  SSN: [id number]    YOB: 1945  Age: 66 y.o. Sex: female      Subjective:      Ale Mays is a 66 y.o. female who present with ESRD requiring hemodialysis access.      Past Medical History:   Diagnosis Date    Anemia in CKD (chronic kidney disease)     Aortic stenosis     Asthma     Calculus of kidney     Cancer ([address]     right kidney    Cardiomyopathy ([address]     EF 20-25% (10/16), 55% (06/16)    Chronic kidney disease     dialysis MWF    Chronic kidney disease, stage V ([address]     Diabetes ([address]     Dialysis patient ([address]     Dialysis patient ([address]     M-W-F; LEFT AVF; PERM CATH RIGHT CHEST    Gastrointestinal hemorrhage associated with peptic ulcer     Gout     Hypercholesterolemia     Hypertension     Primary hyperparathyroidism ([address] 2010    s/p parathyroid adenoma excision    Secondary hyperparathyroidism (of renal origin)     Sleep apnea     C PAP    Status post nephrectomy     right    Thyroid disease      Past Surgical History:   Procedure Laterality Date    COLONOSCOPY  08/28/2015    Repeat 5 years    HX GYN      C-sections x 3    HX HEENT      total thyroidectomy    HX NEPHRECTOMY      right    HX THYROIDECTOMY      HX VASCULAR ACCESS      dialysis catheter R upper chest    VASCULAR SURGERY PROCEDURE UNLIST      fistula LUE      Family History   Problem Relation Age of Onset    Hypertension Mother     Diabetes Mother     No Known Problems Father     No Known Problems Sister     No Known Problems Brother     No Known Problems Other     No Known Problems Brother     No Known Problems Brother  No Known Problems Daughter     No Known Problems Son     No Known Problems Son      Social History   Substance Use Topics    Smoking status: Never Smoker    Smokeless tobacco: Never Used    Alcohol use No      Prior to Admission medications    Medication Sig Start Date End Date Taking? Authorizing Provider   levothyroxine (SYNTHROID) 50 mcg tablet Take 1 Tab by mouth Daily (before breakfast). 5/30/17  Yes Del Guerra MD   metoprolol succinate (TOPROL-XL) 100 mg tablet Take 1 Tab by mouth daily. 5/30/17  Yes Del Guerra MD   amLODIPine (NORVASC) 5 mg tablet Take 1 Tab by mouth daily. 5/2/17  Yes Del Guerra MD   losartan (COZAAR) 100 mg tablet TAKE 1 TABLET BY MOUTH ONCE DAILY 5/1/17  Yes Del Guerra MD   RENVELA 800 mg tab tab Take 800 mg by mouth three (3) times daily. Patient reports medication is taken five times daily with meals and snacks 3/9/17  Yes Historical Provider   FABBY-DARLIN 0.8 mg tab tablet Take 0.8 mg by mouth daily. 1/18/17  Yes Historical Provider   OXYGEN-AIR DELIVERY SYSTEMS Take 3 L by inhalation continuous. 10/30/16  Yes Historical Provider   atorvastatin (LIPITOR) 40 mg tablet Take 20 mg by mouth daily. 10/21/15  Yes Historical Provider   budesonide-formoterol (SYMBICORT) 160-4.5 mcg/actuation HFA inhaler Take 2 Puffs by inhalation two (2) times a day. Historical Provider        Allergies   Allergen Reactions    Ace Inhibitors Cough    Aspirin Other (comments)     Hallucinations    Hydralazine Other (comments)     Hallucinations, dizziness and numbness in legs        Review of Systems:  Pertinent items are noted in the History of Present Illness.     Objective:     Vitals:    07/18/17 1241 07/18/17 1251 07/18/17 1300 07/18/17 1302   BP: 117/46 178/45     Pulse: (!) 47  (!) 50 (!) 50   Resp: 18 18 18 18   Temp:       SpO2: 100% 100% 100% 100%   Weight:       Height:            Physical Exam:  GENERAL: alert, cooperative, no distress, appears stated age  LUNG: clear to auscultation bilaterally  HEART: regular rate and rhythm, S1, S2 normal, no murmur, click, rub or gallop  ABDOMEN: soft, non-tender.  Bowel sounds normal. No masses,  no organomegaly  EXTREMITIES:  extremities normal, atraumatic, no cyanosis or edema    Assessment:     ESRD    Plan:     LUE AVG    Signed By: Jennifer Roldan MD     July 18, 2017

## 2017-07-18 NOTE — OP NOTES
AV GRAFT Op Note      Date of Surgery: 7/18/2017    Hospital: [facility]   Surgeon(s): Mike Win MD  Assistant(s): Susan Sapp PA-C  Pre-operative Diagnosis: n18.6 end stage renal disease  Post-operative Diagnosis: n18.6 end stage renal disease  Procedure(s) Performed:  Procedure(s):  left arm graft  Anesthesia:  general  Findings:  Good thrill  Complications: none  Estimated Blood Loss:  Minimal <100  Tubes and Drains:  none  Specimens: * No specimens in log *    The patient was placed in the supine position. The left arm was prepped with Chlorhexadine and draped in a sterile manner. Skin incision(s) were made in the  antecubitum and wrist. Underlying soft tissue was dissected and the Left Brachial and axillary were exposed and encircled with vessel loops. A 4-7 mm diameter PTFE was tunneled under the skin in a straight configuration. IV heparin was administered at a dose of  3 thousand units. The arterial and venous vessels were occluded with vascular clamps and opened with a #11 blade. The anastomoses performed in an end-to-side manner utilizing 6-0 PTFE. The clamps were then removed in a manner to prevent debris or air embolism. A pulse and thrill was present over the newly created AV graft. Adequate flow in the runoff vein was present as determined by palpation and doppler signals. Distal arterial perfusion was determined to be adequate by exam and doppler signal. The heparin was reversed with  20 mg of protamine. Suture line bleeding was controlled by digital pressure. The tissues were approximated with 3-0 Vicryl; the skin was closed with 4-0 Monocryl. The skin was dressed with Dermabond. The patient tolerated the procedure well without any complications.     Mike Win MD, FACS

## 2017-07-18 NOTE — H&P
Date of Surgery Update:  Corrie Guardado was seen and examined. History and physical has been reviewed. The patient has been examined.  There have been no significant clinical changes since the completion of the originally dated History and Physical.    Signed By: Jennifer Roldan MD     July 18, 2017 1:07 PM

## 2017-07-18 NOTE — IP AVS SNAPSHOT
[address] [address] Maranda King  
[phone] Patient: David De Paz MRN: [id number] :1945 You are allergic to the following Allergen Reactions Ace Inhibitors Cough Aspirin Other (comments) Hallucinations Hydralazine Other (comments) Hallucinations, dizziness and numbness in legs Recent Documentation Height Weight BMI OB Status Smoking Status 1.422 m 59 kg 29.15 kg/m2 Postmenopausal Never Smoker Emergency Contacts Name Discharge Info Relation Home Work Mobile Pavan Farrar DISCHARGE CAREGIVER [3] Spouse [3] [phone] About your hospitalization You were admitted on:  2017 [facility] on:  2017 Unit phone number:  [phone] Why you were hospitalized Your primary diagnosis was:  Not on File Providers Seen During Your Hospitalizations Provider Role Specialty Primary office phone Radha Jules MD Attending Provider Vascular Surgery [phone] Your Primary Care Physician (PCP) Primary Care Physician Office Phone Office Fax Yumiko Hernandez [phone] Follow-up Information Follow up With Details Comments Contact Info Jose Eduardo Watters MD   [address] [address] [id number] [phone] Current Discharge Medication List  
  
START taking these medications Dose & Instructions Dispensing Information Comments Morning Noon Evening Bedtime  
 oxyCODONE-acetaminophen 5-325 mg per tablet Commonly known as:  PERCOCET Your last dose was: Your next dose is:    
   
   
 Dose:  1 Tab Take 1 Tab by mouth every four (4) hours as needed for Pain. Max Daily Amount: 6 Tabs. Quantity:  20 Tab Refills:  0 CONTINUE these medications which have NOT CHANGED Dose & Instructions Dispensing Information Comments Morning Noon Evening Bedtime  
 amLODIPine 5 mg tablet Commonly known as:  Achilles Cordova Your last dose was: Your next dose is:    
   
   
 Dose:  5 mg Take 1 Tab by mouth daily. Quantity:  30 Tab Refills:  6  
     
   
   
   
  
 atorvastatin 40 mg tablet Commonly known as:  LIPITOR Your last dose was: Your next dose is:    
   
   
 Dose:  20 mg Take 20 mg by mouth daily. Refills:  5  
     
   
   
   
  
 levothyroxine 50 mcg tablet Commonly known as:  SYNTHROID Your last dose was: Your next dose is:    
   
   
 Dose:  50 mcg Take 1 Tab by mouth Daily (before breakfast). Quantity:  90 Tab Refills:  1  
     
   
   
   
  
 losartan 100 mg tablet Commonly known as:  COZAAR Your last dose was: Your next dose is: TAKE 1 TABLET BY MOUTH ONCE DAILY Quantity:  30 Tab Refills:  6  
     
   
   
   
  
 metoprolol succinate 100 mg tablet Commonly known as:  TOPROL-XL Your last dose was: Your next dose is:    
   
   
 Dose:  100 mg Take 1 Tab by mouth daily. Quantity:  30 Tab Refills:  6 OXYGEN-AIR DELIVERY SYSTEMS Your last dose was: Your next dose is:    
   
   
 Dose:  3 L Take 3 L by inhalation continuous. Refills:  0  
     
   
   
   
  
 FABBY-DARLIN 0.8 mg Tab tablet Generic drug:  b complex-vitamin c-folic acid 0.8 mg Your last dose was: Your next dose is:    
   
   
 Dose:  0.8 mg Take 0.8 mg by mouth daily. Refills:  11  
     
   
   
   
  
 RENVELA 800 mg Tab tab Generic drug:  sevelamer carbonate Your last dose was: Your next dose is:    
   
   
 Dose:  800 mg Take 800 mg by mouth three (3) times daily. Patient reports medication is taken five times daily with meals and snacks Refills:  11  
     
   
   
   
  
 SYMBICORT 160-4.5 mcg/actuation HFA inhaler Generic drug:  budesonide-formoterol Your last dose was: Your next dose is:    
   
   
 Dose:  2 Puff Take 2 Puffs by inhalation two (2) times a day. Refills:  0 Where to Get Your Medications Information on where to get these meds will be given to you by the nurse or doctor. ! Ask your nurse or doctor about these medications  
  oxyCODONE-acetaminophen 5-325 mg per tablet Discharge Instructions Hemodialysis Access: What to Expect at [facility] Your Recovery Hemodialysis is a way to remove wastes from the blood when your kidneys can no longer do the job. It is not a cure, but it can help you live longer and feel better. It is a lifesaving treatment when you have kidney failure. Hemodialysis is often called dialysis. Your doctor created a place (called an access) in your arm for your blood to flow in and out of your body during your dialysis sessions. Your arm will probably be bruised and swollen. It may hurt. The cut (incision) may bleed. The pain and bleeding will get better over several days. You will probably need only over-the-counter pain medicine. You can reduce swelling by propping your arm on 1 or 2 pillows and keeping your elbow straight. You will have stitches. These may dissolve on their own, or your doctor will tell you when to come in to have them removed. You should also be able to return to work in a few days. You may feel some coolness or numbness in your hand. These feelings usually go away in a few weeks. Your doctor may suggest squeezing a soft object. This will strengthen your access and may make hemodialysis faster and easier. You should always be able to feel blood rushing through the fistula or graft.  It feels like a slight vibration when you put your fingers on the skin over the fistula or graft. This feeling is called a thrill or pulse. This care sheet gives you a general idea about how long it will take for you to recover. But each person recovers at a different pace. Follow the steps below to get better as quickly as possible. How can you care for yourself at home? Activity · Rest when you feel tired. Getting enough sleep will help you recover. Do not lie on or sleep on the arm with the access. · Avoid activities such as washing windows or gardening that put stress on the arm with the access. · You may use your arm, but do not lift anything that weighs more than about 15 pounds. This may include a child, heavy grocery bags, a heavy briefcase or backpack, cat litter or dog food bags, or a vacuum . · You can shower, but keep the access dry for the first 2 days. Cover the area with a plastic bag to keep it dry. · Do not soak or scrub the incision until it has healed. · Wear an arm guard to protect the area if you play sports or work with your arms. · You may drive when your doctor says it is okay. This is usually in 1 to 2 days. · Most people are able to return to work about 1 or 2 days after surgery. Diet · Follow an eating plan that is good for your kidneys. A registered dietitian can help you make a meal plan that is right for you. You may need to limit protein, salt, fluids, and certain foods. Medicines · Your doctor will tell you if and when you can restart your medicines. He or she will also give you instructions about taking any new medicines. · If you take blood thinners, such as warfarin (Coumadin), clopidogrel (Plavix), or aspirin, be sure to talk to your doctor. He or she will tell you if and when to start taking those medicines again. Make sure that you understand exactly what your doctor wants you to do. · Take pain medicines exactly as directed. ¨ If the doctor gave you a prescription medicine for pain, take it as prescribed. ¨ If you are not taking a prescription pain medicine, ask your doctor if you can take acetaminophen (Tylenol). Do not take ibuprofen (Advil, Motrin) or naproxen (Aleve), or similar medicines, unless your doctor tells you to. They may make chronic kidney disease worse. ¨ Do not take two or more pain medicines at the same time unless the doctor told you to. Many pain medicines have acetaminophen, which is Tylenol. Too much acetaminophen (Tylenol) can be harmful. · If you think your pain medicine is making you sick to your stomach: 
¨ Take your medicine after meals (unless your doctor has told you not to). ¨ Ask your doctor for a different pain medicine. · If your doctor prescribed antibiotics, take them as directed. Do not stop taking them just because you feel better. You need to take the full course of antibiotics. Incision care · Keep the area dry for 2 days. After 2 days, wash the area with soap and water every day, and always before dialysis. · Do not soak or scrub the incision until it has healed. · If you have a bandage, change it every day or as your doctor recommends. Your doctor will tell you when you can remove it. Exercise · Squeeze a soft ball or other object as your doctor tells you. This will help blood flow through the access and help prevent blood clots. Elevation · Prop up the sore arm on a pillow anytime you sit or lie down during the next 3 days. Try to keep it above the level of your heart. This will help reduce swelling. Other instructions · Every day, check your access for a pulse or thrill in the fistula or graft area. A thrill is a vibration. To feel a pulse or thrill, place the first two fingers of your hand over the access. · Do not bump your arm. · Do not wear tight clothing, jewelry, or anything else that may squeeze the access. · Use your other arm to have blood drawn or blood pressure taken. · Do not put cream or lotion on or near the access. · Make sure all doctors you deal with know you have a vascular access. Follow-up care is a key part of your treatment and safety. Be sure to make and go to all appointments, and call your doctor if you are having problems. It's also a good idea to know your test results and keep a list of the medicines you take. When should you call for help? Call 911 anytime you think you may need emergency care. For example, call if: 
· You passed out (lost consciousness). · You have severe trouble breathing. · You have sudden chest pain and shortness of breath, or you cough up blood. · You have a rapid pulse or heartbeat. Call your doctor now or seek immediate medical care if: 
· Your hand or arm is cold or dark-colored. · You have sudden bulging around your access. · You have no pulse or thrill in your access, or you hear no sound of blood in your access. · Bleeding after dialysis lasts longer than usual. 
· You have severe pain that does not get better after you take pain medicine. · You have a fever over 100°F. 
· You have loose stitches, or your incision comes open. · You are bleeding from the incision more than you had been. · You have signs of infection, such as: 
¨ Increased pain, swelling, warmth, or redness. ¨ Red streaks leading from the incision. ¨ Pus draining from the incision. ¨ Swollen lymph nodes in your neck, armpits, or groin. ¨ A fever. Watch closely for changes in your health, and be sure to contact your doctor if you have any problems. Where can you learn more? Go to http://mihai-zach.info/. Enter P616 in the search box to learn more about \"Hemodialysis Access: What to Expect at Home. \" Current as of: November 16, 2016 Content Version: 11.3 © 2242-5659 [employer]. Care instructions adapted under license by [employer] (which disclaims liability or warranty for this information).  If you have questions about a medical condition or this instruction, always ask your healthcare professional. [address] [address] any warranty or liability for your use of this information. DISCHARGE SUMMARY from Nurse The following personal items are in your possession at time of discharge: 
 
Dental Appliances: None Visual Aid: Glasses PATIENT INSTRUCTIONS: 
 
After general anesthesia or intravenous sedation, for 24 hours or while taking prescription Narcotics: · Limit your activities · Do not drive and operate hazardous machinery · Do not make important personal or business decisions · Do  not drink alcoholic beverages · If you have not urinated within 8 hours after discharge, please contact your surgeon on call. Report the following to your surgeon: 
· Excessive pain, swelling, redness or odor of or around the surgical area · Temperature over 100.5 · Nausea and vomiting lasting longer than 4 hours or if unable to take medications · Any signs of decreased circulation or nerve impairment to extremity: change in color, persistent  numbness, tingling, coldness or increase pain · Any questions What to do at Home: These are general instructions for a healthy lifestyle: No smoking/ No tobacco products/ Avoid exposure to second hand smoke Surgeon General's Warning:  Quitting smoking now greatly reduces serious risk to your health. Obesity, smoking, and sedentary lifestyle greatly increases your risk for illness A healthy diet, regular physical exercise & weight monitoring are important for maintaining a healthy lifestyle You may be retaining fluid if you have a history of heart failure or if you experience any of the following symptoms:  Weight gain of 3 pounds or more overnight or 5 pounds in a week, increased swelling in our hands or feet or shortness of breath while lying flat in bed.   Please call your doctor as soon as you notice any of these symptoms; do not wait until your next office visit. Recognize signs and symptoms of STROKE: 
 
F-face looks uneven A-arms unable to move or move unevenly S-speech slurred or non-existent T-time-call 911 as soon as signs and symptoms begin-DO NOT go Back to bed or wait to see if you get better-TIME IS BRAIN. Warning Signs of HEART ATTACK Call 911 if you have these symptoms: 
? Chest discomfort. Most heart attacks involve discomfort in the center of the chest that lasts more than a few minutes, or that goes away and comes back. It can feel like uncomfortable pressure, squeezing, fullness, or pain. ? Discomfort in other areas of the upper body. Symptoms can include pain or discomfort in one or both arms, the back, neck, jaw, or stomach. ? Shortness of breath with or without chest discomfort. ? Other signs may include breaking out in a cold sweat, nausea, or lightheadedness. Don't wait more than five minutes to call [address]! Fast action can save your life. Calling 911 is almost always the fastest way to get lifesaving treatment. Emergency Medical Services staff can begin treatment when they arrive  up to an hour sooner than if someone gets to the hospital by car. The discharge information has been reviewed with the patient. The patient verbalized understanding. Discharge medications reviewed with the patient and appropriate educational materials and side effects teaching were provided. Patient armband removed and given to patient to take home. Patient was informed of the privacy risks if armband lost or stolen Discharge Orders None Introducing [facility] SERVICES! Gustavo Soriano introduces [employer] patient portal. Now you can access parts of your medical record, email your doctor's office, and request medication refills online. 1. In your internet browser, go to [email or web address] 2. Click on the First Time User? Click Here link in the Sign In box.  You will see the New Member Sign Up page. 3. Enter your [employer] Access Code exactly as it appears below. You will not need to use this code after youve completed the sign-up process. If you do not sign up before the expiration date, you must request a new code. · [employer] Access Code: [email or web address] Expires: 10/15/2017  1:18 PM 
 
4. Enter the last four digits of your Social Security Number (xxxx) and Date of Birth (mm/dd/yyyy) as indicated and click Submit. You will be taken to the next sign-up page. 5. Create a [employer] ID. This will be your [employer] login ID and cannot be changed, so think of one that is secure and easy to remember. 6. Create a [employer] password. You can change your password at any time. 7. Enter your Password Reset Question and Answer. This can be used at a later time if you forget your password. 8. Enter your e-mail address. You will receive e-mail notification when new information is available in [address] 9. Click Sign Up. You can now view and download portions of your medical record. 10. Click the Download Summary menu link to download a portable copy of your medical information. If you have questions, please visit the Frequently Asked Questions section of the [employer] website. Remember, [employer] is NOT to be used for urgent needs. For medical emergencies, dial 911. Now available from your iPhone and Android! General Information Please provide this summary of care documentation to your next provider. Patient Signature:  ____________________________________________________________ Date:  ____________________________________________________________  
  
Peewee Delvalle Provider Signature:  ____________________________________________________________ Date:  ____________________________________________________________

## 2017-07-18 NOTE — ANESTHESIA PREPROCEDURE EVALUATION
Anesthetic History   No history of anesthetic complications            Review of Systems / Medical History  Patient summary reviewed and pertinent labs reviewed    Pulmonary    COPD: moderate    Sleep apnea: CPAP    Asthma        Neuro/Psych              Cardiovascular    Hypertension      CHF        Exercise tolerance: <4 METS     GI/Hepatic/Renal         Renal disease: ESRD and dialysis  PUD     Endo/Other    Diabetes: type 2  Hypothyroidism       Other Findings   Comments:   Risk Factors for Postoperative nausea/vomiting:       History of postoperative nausea/vomiting? NO       Female? YES       Motion sickness? NO       Intended opioid administration for postoperative analgesia? YES      Smoking Abstinence  Current Smoker? NO  Elective Surgery? YES  Seen preoperatively by anesthesiologist or proxy prior to day of surgery? YES  Pt abstained from smoking 24 hours prior to anesthesia?  YES               Physical Exam    Airway  Mallampati: II  TM Distance: 4 - 6 cm  Neck ROM: normal range of motion   Mouth opening: Normal     Cardiovascular    Rhythm: regular  Rate: normal         Dental    Dentition: Poor dentition     Pulmonary  Breath sounds clear to auscultation               Abdominal  GI exam deferred       Other Findings            Anesthetic Plan    ASA: 4  Anesthesia type: general and regional - supraclavicular block            Anesthetic plan and risks discussed with: Patient

## 2017-07-18 NOTE — ANESTHESIA POSTPROCEDURE EVALUATION
Post-Anesthesia Evaluation and Assessment    Patient: Bhargav Mendoza MRN: [id number]  SSN: [id number]    YOB: 1945  Age: 66 y.o. Sex: female       Cardiovascular Function/Vital Signs  Visit Vitals    /50    Pulse (!) 51    Temp 36.9 °C (98.4 °F)    Resp 11    Ht 4' 8\" (1.422 m)    Wt 59 kg (130 lb)    SpO2 92%    BMI 29.15 kg/m2       Patient is status post general, regional anesthesia for Procedure(s):  left arm graft. Nausea/Vomiting: None    Postoperative hydration reviewed and adequate. Pain:  Pain Scale 1: Numeric (0 - 10) (07/18/17 1535)  Pain Intensity 1: 10 (07/18/17 1535)   Managed    Neurological Status:   Neuro (WDL): Within Defined Limits (07/18/17 1523)   At baseline    Mental Status and Level of Consciousness: Arousable    Pulmonary Status:   O2 Device: Room air (07/18/17 1711)   Adequate oxygenation and airway patent    Complications related to anesthesia: None    Post-anesthesia assessment completed.  No concerns    Signed By: Daniel Barajas MD     July 18, 2017

## 2017-07-18 NOTE — ANESTHESIA PROCEDURE NOTES
Peripheral Block    Start time: 7/18/2017 12:20 PM  End time: 7/18/2017 12:30 PM  Performed by: Michel Sheehan  Authorized by: [employer]       Pre-procedure: Indications: at surgeon's request and post-op pain management    Preanesthetic Checklist: patient identified, risks and benefits discussed, site marked, timeout performed, anesthesia consent given and patient being monitored    Timeout Time: 12:22          Block Type:   Block Type:  Supraclavicular  Laterality:  Left  Monitoring:  Frequent vital sign checks, heart rate, oxygen, continuous pulse ox and responsive to questions  Injection Technique:  Single shot  Procedures: ultrasound guided and nerve stimulator    Patient Position: supine  Prep: chlorhexidine    Needle Type:  Ultraplex  Needle Gauge:  22 G  Needle Localization:  Ultrasound guidance  Volume (mL):  30    Assessment:  Number of attempts:  1  Injection Assessment:  Incremental injection every 5 mL, negative aspiration for blood, local visualized surrounding nerve on ultrasound, no paresthesia, ultrasound image on chart and no intravascular symptoms  Patient tolerance:  Patient tolerated the procedure well with no immediate complications  For Vitals see nursing notes.      Jazmine Pierce MD  2:36 PM

## 2017-09-27 ENCOUNTER — HOSPITAL ENCOUNTER (OUTPATIENT)
Dept: CT IMAGING | Age: 72
Discharge: HOME | End: 2017-09-27
Attending: INTERNAL MEDICINE
Payer: MEDICARE

## 2017-09-27 DIAGNOSIS — J84.10 PULMONARY FIBROSIS, UNSPECIFIED (HCC): ICD-10-CM

## 2017-09-27 PROCEDURE — 71250 CT THORAX DX C-: CPT

## 2017-10-05 ENCOUNTER — OFFICE VISIT (OUTPATIENT)
Dept: FAMILY MEDICINE CLINIC | Age: 72
End: 2017-10-05

## 2017-10-05 VITALS
WEIGHT: 132 LBS | SYSTOLIC BLOOD PRESSURE: 178 MMHG | RESPIRATION RATE: 18 BRPM | TEMPERATURE: 97.6 F | HEIGHT: 56 IN | BODY MASS INDEX: 29.69 KG/M2 | DIASTOLIC BLOOD PRESSURE: 62 MMHG | HEART RATE: 58 BPM

## 2017-10-05 DIAGNOSIS — N18.6 ESRD (END STAGE RENAL DISEASE) ON DIALYSIS (HCC): ICD-10-CM

## 2017-10-05 DIAGNOSIS — Z01.818 PRE-OP EVALUATION: Primary | ICD-10-CM

## 2017-10-05 DIAGNOSIS — E11.65 TYPE 2 DIABETES MELLITUS WITH HYPERGLYCEMIA, WITHOUT LONG-TERM CURRENT USE OF INSULIN (HCC): ICD-10-CM

## 2017-10-05 DIAGNOSIS — E03.4 HYPOTHYROIDISM DUE TO ACQUIRED ATROPHY OF THYROID: Chronic | ICD-10-CM

## 2017-10-05 DIAGNOSIS — I10 ESSENTIAL HYPERTENSION: Chronic | ICD-10-CM

## 2017-10-05 DIAGNOSIS — Z99.2 ESRD (END STAGE RENAL DISEASE) ON DIALYSIS (HCC): ICD-10-CM

## 2017-10-05 DIAGNOSIS — H26.9 CATARACT OF LEFT EYE, UNSPECIFIED CATARACT TYPE: ICD-10-CM

## 2017-10-05 LAB — HBA1C MFR BLD HPLC: 5.2 %

## 2017-10-05 NOTE — MR AVS SNAPSHOT
Visit Information Date & Time Provider Department Dept. Phone Encounter #  
 10/5/2017 11:30 AM Suzanne Lyons MD [address] [id number] Follow-up Instructions Return in about 3 months (around 1/5/2018) for follow up. Your Appointments 3/19/2018 10:30 AM  
Office Visit with Suzanne Lyons MD  
Benita Torres ([facility]) Appt Note: mwv  
 [address]  
  
   
 [address] Upcoming Health Maintenance Date Due HEMOGLOBIN A1C Q6M 11/2/2017 MICROALBUMIN Q1 1/31/2018 MEDICARE YEARLY EXAM 3/23/2018 FOOT EXAM Q1 5/2/2018 LIPID PANEL Q1 5/2/2018 EYE EXAM RETINAL OR DILATED Q1 6/22/2018 BREAST CANCER SCRN MAMMOGRAM 7/15/2018 GLAUCOMA SCREENING Q2Y 6/22/2019 COLONOSCOPY 8/28/2020 DTaP/Tdap/Td series (2 - Td) 12/1/2026 Allergies as of 10/5/2017  Review Complete On: 10/5/2017 By: Suzanne Lyons MD  
  
 Severity Noted Reaction Type Reactions Ace Inhibitors  08/04/2015    Cough Aspirin  01/09/2013    Other (comments) Hallucinations Hydralazine  08/04/2015    Other (comments) Hallucinations, dizziness and numbness in legs Current Immunizations  Reviewed on 10/5/2017 Name Date Influenza High Dose Vaccine PF 9/12/2017 Influenza Vaccine 9/10/2016, 10/1/2009 Pneumococcal Conjugate (PCV-13) 8/14/2016 Pneumococcal Vaccine (Unspecified Type) 4/8/2010 Tdap 12/1/2016 Reviewed by Suzanne Lyons MD on 10/5/2017 at 12:06 PM  
You Were Diagnosed With   
  
 Codes Comments Pre-op evaluation    -  Primary ICD-10-CM: B84.911 ICD-9-CM: V72.84 Cataract of left eye, unspecified cataract type     ICD-10-CM: H26.9 ICD-9-CM: 366.9 Essential hypertension     ICD-10-CM: I10 
ICD-9-CM: 401.9  Type 2 diabetes mellitus with hyperglycemia, without long-term current use of insulin ([facility]     ICD-10-CM: E11.65 ICD-9-CM: 250.00, 790.29 Hypothyroidism due to acquired atrophy of thyroid     ICD-10-CM: E03.4 ICD-9-CM: 244.8, 246.8 ESRD (end stage renal disease) on dialysis ([facility]     ICD-10-CM: N18.6, Z99.2 ICD-9-CM: 585.6, V45.11 Vitals BP Pulse Temp Resp Height(growth percentile) Weight(growth percentile)  
 178/62 (!) 58 97.6 °F (36.4 °C) (Oral) 18 4' 8\" (1.422 m) 132 lb (59.9 kg) BMI OB Status Smoking Status 29.59 kg/m2 Postmenopausal Never Smoker BMI and BSA Data Body Mass Index Body Surface Area  
 29.59 kg/m 2 1.54 m 2 Preferred Pharmacy Pharmacy Name Phone [id number] - [facility] [address] [facility] [phone] Your Updated Medication List  
  
   
This list is accurate as of: 10/5/17 12:28 PM.  Always use your most recent med list. amLODIPine 5 mg tablet Commonly known as:  Sheila Darting Take 1 Tab by mouth daily. atorvastatin 40 mg tablet Commonly known as:  LIPITOR Take 20 mg by mouth daily. levothyroxine 50 mcg tablet Commonly known as:  SYNTHROID Take 1 Tab by mouth Daily (before breakfast). losartan 100 mg tablet Commonly known as:  COZAAR  
TAKE 1 TABLET BY MOUTH ONCE DAILY  
  
 metoprolol succinate 100 mg tablet Commonly known as:  TOPROL-XL Take 1 Tab by mouth daily. OXYGEN-AIR DELIVERY SYSTEMS Take 3 L by inhalation continuous. FABBY-DARLIN 0.8 mg Tab tablet Generic drug:  b complex-vitamin c-folic acid 0.8 mg Take 0.8 mg by mouth daily. RENVELA 800 mg Tab tab Generic drug:  sevelamer carbonate Take 800 mg by mouth three (3) times daily. Patient reports medication is taken five times daily with meals and snacks SYMBICORT 160-4.5 mcg/actuation Hfaa Generic drug:  budesonide-formoterol Take 2 Puffs by inhalation two (2) times a day. We Performed the Following AMB POC HEMOGLOBIN A1C [89014 CPT(R)] Follow-up Instructions Return in about 3 months (around 1/5/2018) for follow up. To-Do List   
 10/05/2017 Lab:  METABOLIC PANEL, BASIC   
  
 10/05/2017 Lab:  TSH 3RD GENERATION   
  
 10/24/2017 8:00 AM  
  Appointment with [facility] at [address] ([phone]) OUTSIDE FILMS  - Any outside films related to the study being scheduled should be brought with you on the day of the exam.  If this cannot be done there may be a delay in the reading of the study. MEDICATIONS  - Patient must bring a complete list of all medications currently taking to include prescriptions, over-the-counter meds, herbals, vitamins & any dietary supplements  GENERAL INSTRUCTIONS  - On the day of your exam do not use any bath powder, deodorant or lotions on the armpit area. -Tenderness of breasts may cause an increase of discomfort during procedure. If you are experiencing breast tenderness on the day of your appointment and would like to reschedule, please call [phone]. Patient Instructions DASH Diet: Care Instructions Your Care Instructions The DASH diet is an eating plan that can help lower your blood pressure. DASH stands for Dietary Approaches to Stop Hypertension. Hypertension is high blood pressure. The DASH diet focuses on eating foods that are high in calcium, potassium, and magnesium. These nutrients can lower blood pressure. The foods that are highest in these nutrients are fruits, vegetables, low-fat dairy products, nuts, seeds, and legumes. But taking calcium, potassium, and magnesium supplements instead of eating foods that are high in those nutrients does not have the same effect. The DASH diet also includes whole grains, fish, and poultry. The DASH diet is one of several lifestyle changes your doctor may recommend to lower your high blood pressure.  Your doctor may also want you to decrease the amount of sodium in your diet. Lowering sodium while following the DASH diet can lower blood pressure even further than just the DASH diet alone. Follow-up care is a key part of your treatment and safety. Be sure to make and go to all appointments, and call your doctor if you are having problems. It's also a good idea to know your test results and keep a list of the medicines you take. How can you care for yourself at home? Following the DASH diet · Eat 4 to 5 servings of fruit each day. A serving is 1 medium-sized piece of fruit, ½ cup chopped or canned fruit, 1/4 cup dried fruit, or 4 ounces (½ cup) of fruit juice. Choose fruit more often than fruit juice. · Eat 4 to 5 servings of vegetables each day. A serving is 1 cup of lettuce or raw leafy vegetables, ½ cup of chopped or cooked vegetables, or 4 ounces (½ cup) of vegetable juice. Choose vegetables more often than vegetable juice. · Get 2 to 3 servings of low-fat and fat-free dairy each day. A serving is 8 ounces of milk, 1 cup of yogurt, or 1 ½ ounces of cheese. · Eat 6 to 8 servings of grains each day. A serving is 1 slice of bread, 1 ounce of dry cereal, or ½ cup of cooked rice, pasta, or cooked cereal. Try to choose whole-grain products as much as possible. · Limit lean meat, poultry, and fish to 2 servings each day. A serving is 3 ounces, about the size of a deck of cards. · Eat 4 to 5 servings of nuts, seeds, and legumes (cooked dried beans, lentils, and split peas) each week. A serving is 1/3 cup of nuts, 2 tablespoons of seeds, or ½ cup of cooked beans or peas. · Limit fats and oils to 2 to 3 servings each day. A serving is 1 teaspoon of vegetable oil or 2 tablespoons of salad dressing. · Limit sweets and added sugars to 5 servings or less a week. A serving is 1 tablespoon jelly or jam, ½ cup sorbet, or 1 cup of lemonade. · Eat less than 2,300 milligrams (mg) of sodium a day.  If you limit your sodium to 1,500 mg a day, you can lower your blood pressure even more. Tips for success · Start small. Do not try to make dramatic changes to your diet all at once. You might feel that you are missing out on your favorite foods and then be more likely to not follow the plan. Make small changes, and stick with them. Once those changes become habit, add a few more changes. · Try some of the following: ¨ Make it a goal to eat a fruit or vegetable at every meal and at snacks. This will make it easy to get the recommended amount of fruits and vegetables each day. ¨ Try yogurt topped with fruit and nuts for a snack or healthy dessert. ¨ Add lettuce, tomato, cucumber, and onion to sandwiches. ¨ Combine a ready-made pizza crust with low-fat mozzarella cheese and lots of vegetable toppings. Try using tomatoes, squash, spinach, broccoli, carrots, cauliflower, and onions. ¨ Have a variety of cut-up vegetables with a low-fat dip as an appetizer instead of chips and dip. ¨ Sprinkle sunflower seeds or chopped almonds over salads. Or try adding chopped walnuts or almonds to cooked vegetables. ¨ Try some vegetarian meals using beans and peas. Add garbanzo or kidney beans to salads. Make burritos and tacos with mashed barrera beans or black beans. Where can you learn more? Go to http://mihai-zach.info/. Enter I710 in the search box to learn more about \"DASH Diet: Care Instructions. \" Current as of: April 3, 2017 Content Version: 11.3 © 1706-0881 [employer]. Care instructions adapted under license by [employer] (which disclaims liability or warranty for this information). If you have questions about a medical condition or this instruction, always ask your healthcare professional. [address] any warranty or liability for your use of this information. Introducing [facility]! [employer] introduces [employer] patient portal. Now you can access parts of your medical record, email your doctor's office, and request medication refills online. 1. In your internet browser, go to [email or web address] 2. Click on the First Time User? Click Here link in the Sign In box. You will see the New Member Sign Up page. 3. Enter your [employer] Access Code exactly as it appears below. You will not need to use this code after youve completed the sign-up process. If you do not sign up before the expiration date, you must request a new code. · [employer] Access Code: [email or web address] Expires: 10/15/2017  1:18 PM 
 
4. Enter the last four digits of your Social Security Number (xxxx) and Date of Birth (mm/dd/yyyy) as indicated and click Submit. You will be taken to the next sign-up page. 5. Create a [employer] ID. This will be your [employer] login ID and cannot be changed, so think of one that is secure and easy to remember. 6. Create a [employer] password. You can change your password at any time. 7. Enter your Password Reset Question and Answer. This can be used at a later time if you forget your password. 8. Enter your e-mail address. You will receive e-mail notification when new information is available in [address] 9. Click Sign Up. You can now view and download portions of your medical record. 10. Click the Download Summary menu link to download a portable copy of your medical information. If you have questions, please visit the Frequently Asked Questions section of the [employer] website. Remember, [employer] is NOT to be used for urgent needs. For medical emergencies, dial 911. Now available from your iPhone and Android! Please provide this summary of care documentation to your next provider. Your primary care clinician is listed as Janessa Oglesby. If you have any questions after today's visit, please call [phone].

## 2017-10-05 NOTE — PROGRESS NOTES
Chief Complaint   Patient presents with    Follow Up Chronic Condition     Pre op eye surgery clearance     1. Have you been to the ER, urgent care clinic since your last visit? Hospitalized since your last visit? No    2. Have you seen or consulted any other health care providers outside of the [address] [place] [address] since your last visit? Include any pap smears or colon screening. No     Patient is scheduled for eye cataract repair surgery this month. Patient is scheduled with Dr. Rosalie Brantley.

## 2017-10-05 NOTE — PATIENT INSTRUCTIONS

## 2017-10-05 NOTE — PROGRESS NOTES
Chief Complaint   Patient presents with    Follow Up Chronic Condition     Pre op eye surgery clearance       Pt is a 66y.o. year old female who presents for follow up of her chronic medical problems/pre op clearance for left cataract extraction under topical with MAC with Dr. Sheila Harrison on 10/12/2017    Already had right cataract extraction done-success      Health Maintenance Due   Topic Date Due    ZOSTER VACCINE AGE 60>  10/06/2003-declined    HEMOGLOBIN A1C Q6M  11/02/2017-today     Last A1C was 5.2 done in May  Denies polyuria, polydipsia and polyphagia  Not on meds for diabetes    Repeat BP-still elevated  Home BPs-high recently but has been normal prior to these last few days    Lab Results   Component Value Date/Time    TSH 0.021 05/02/2017 11:07 AM   Synthroid dose adjusted since from 75 mcg to 50 mcg    Wt Readings from Last 3 Encounters:   10/05/17 132 lb (59.9 kg)   07/18/17 130 lb (59 kg)   05/30/17 122 lb (55.3 kg)       Lab Results   Component Value Date/Time    Cholesterol, total 171 05/02/2017 11:07 AM    HDL Cholesterol 87 05/02/2017 11:07 AM    LDL, calculated 71 05/02/2017 11:07 AM    VLDL, calculated 13 05/02/2017 11:07 AM    Triglyceride 63 05/02/2017 11:07 AM    CHOL/HDL Ratio 2.3 09/29/2010 02:20 PM   On Lipitor-compliant, no side effects    Currently on HD 3x a week  Quality of life improved a lot after starting dialysis-no longer using her wheelchair or cane! ROS:    Pt denies: Wt loss, Fever/Chills, HA, Visual changes, Fatigue, Chest pain, SOB, LEBLANC, Abd pain, N/V/D/C, Blood in stool or urine, Edema. Pertinent positive as above in HPI.  All others were negative    Patient Active Problem List   Diagnosis Code    Gout M10.9    Hyperlipidemia E78.5    Tubular adenoma of colon D12.6    Anemia in chronic kidney disease N18.9, D63.1    Chronic obstructive pulmonary disease ([facility] J44.9    MELANIE on CPAP G47.33, Z99.89    Gastrointestinal hemorrhage associated with peptic ulcer K27.4    Elevated LFTs R79.89    Hyperphosphatemia E83.39    CHF (congestive heart failure) ([facility]) I50.9    Type 2 diabetes mellitus with hyperglycemia, with long-term current use of insulin ([facility]) E11.65, Z79.4    Advance directive discussed with patient Z70.80    Essential hypertension I10    Hypothyroidism due to acquired atrophy of thyroid E03.4    ESRD (end stage renal disease) on dialysis ([facility] N18.6, Z99.2       Past Medical History:   Diagnosis Date    Anemia in CKD (chronic kidney disease)     Aortic stenosis     Asthma     Calculus of kidney     Cancer ([facility]     right kidney    Cardiomyopathy ([facility]     EF 20-25% (10/16), 55% (06/16)    Chronic kidney disease     dialysis MWF    Chronic kidney disease, stage V ([facility]     Diabetes ([facility]     Dialysis patient ([facility]     Dialysis patient ([facility]     M-W-F; LEFT AVF; PERM CATH RIGHT CHEST    Gastrointestinal hemorrhage associated with peptic ulcer     Gout     Hypercholesterolemia     Hypertension     Primary hyperparathyroidism ([facility] 2010    s/p parathyroid adenoma excision    Secondary hyperparathyroidism (of renal origin)     Sleep apnea     C PAP    Status post nephrectomy     right    Thyroid disease        Current Outpatient Prescriptions   Medication Sig Dispense Refill    levothyroxine (SYNTHROID) 50 mcg tablet Take 1 Tab by mouth Daily (before breakfast). 90 Tab 1    metoprolol succinate (TOPROL-XL) 100 mg tablet Take 1 Tab by mouth daily. 30 Tab 6    amLODIPine (NORVASC) 5 mg tablet Take 1 Tab by mouth daily. 30 Tab 6    losartan (COZAAR) 100 mg tablet TAKE 1 TABLET BY MOUTH ONCE DAILY 30 Tab 6    RENVELA 800 mg tab tab Take 800 mg by mouth three (3) times daily. Patient reports medication is taken five times daily with meals and snacks  11    FABBY-DARLIN 0.8 mg tab tablet Take 0.8 mg by mouth daily. 11    OXYGEN-AIR DELIVERY SYSTEMS Take 3 L by inhalation continuous.       atorvastatin (LIPITOR) 40 mg tablet Take 20 mg by mouth daily.  5    budesonide-formoterol (SYMBICORT) 160-4.5 mcg/actuation HFA inhaler Take 2 Puffs by inhalation two (2) times a day. History   Smoking Status    Never Smoker   Smokeless Tobacco    Never Used       Allergies   Allergen Reactions    Ace Inhibitors Cough    Aspirin Other (comments)     Hallucinations    Hydralazine Other (comments)     Hallucinations, dizziness and numbness in legs        Patient Labs were reviewed: yes      Patient Past Records were reviewed:  yes        Objective:     Vitals:    10/05/17 1202   BP: 178/62   Pulse: (!) 58   Resp: 18   Temp: 97.6 °F (36.4 °C)   TempSrc: Oral   Weight: 132 lb (59.9 kg)   Height: 4' 8\" (1.422 m)     Body mass index is 29.59 kg/(m^2). Exam:   Appearance: alert, well appearing,  oriented to person, place, and time, acyanotic, in no respiratory distress and well hydrated. HEENT:  NC/AT, pink conj, anicteric sclerae, lens opacity on the left  Neck:  No cervical lymphadenopathy, no JVD, no thyromegaly, no carotid bruit  Heart:  RRR without M/R/G  Lungs:  CTAB, no rhonchi, rales, or wheezes with good air exchange   Abdomen:  Non-tender, pos bowel sounds, no hepatosplenomegaly  Ext:  No C/C/E    Skin: no rash, shunt on the left arm  Neuro: no lateralizing signs, CNs II-XII intact    Last Point of Care HGB A1C  Hemoglobin A1c (POC)   Date Value Ref Range Status   10/05/2017 5.2 % Final      Pre op labs reviewed: K normal    Assessment/ Plan:   Diagnoses and all orders for this visit:    1. Pre-op evaluation-patient is medically cleared for left cataract surgery under topical anesthesia    2. Cataract of left eye, unspecified cataract type    3. Essential hypertension-uncontrolled, advised to increase Norvasc to 10 mg daily and continue with home monitoring  -     METABOLIC PANEL, BASIC; Future    4.  Type 2 diabetes mellitus with hyperglycemia, without long-term current use of insulin (HCC)-controlled, continue to watch diet  -     AMB POC HEMOGLOBIN A1C    5. Hypothyroidism due to acquired atrophy of thyroid-on lower dose of Synthroid, check TSH today  -     TSH 3RD GENERATION; Future    6. ESRD (end stage renal disease) on dialysis [facility])    Follow-up Disposition:  Return in about 3 months (around 1/5/2018) for follow up. I have discussed the diagnosis with the patient and the intended plan as seen in the above orders. The patient has received an After-Visit Summary and questions were answered concerning future plans. Medication Side Effects and Warnings were discussed with patient: yes    Patient verbalized understanding of above instructions.     John Sotelo MD  Internal Medicine  [facility]

## 2017-10-06 LAB
BUN SERPL-MCNC: 34 MG/DL (ref 8–27)
BUN/CREAT SERPL: 7 (ref 12–28)
CALCIUM SERPL-MCNC: 10.3 MG/DL (ref 8.7–10.3)
CHLORIDE SERPL-SCNC: 97 MMOL/L (ref 96–106)
CO2 SERPL-SCNC: 26 MMOL/L (ref 18–29)
CREAT SERPL-MCNC: 4.86 MG/DL (ref 0.57–1)
GLUCOSE SERPL-MCNC: 93 MG/DL (ref 65–99)
INTERPRETATION: NORMAL
POTASSIUM SERPL-SCNC: 4.4 MMOL/L (ref 3.5–5.2)
SODIUM SERPL-SCNC: 142 MMOL/L (ref 134–144)
TSH SERPL DL<=0.005 MIU/L-ACNC: 2.68 UIU/ML (ref 0.45–4.5)

## 2017-10-17 ENCOUNTER — HOSPITAL ENCOUNTER (OUTPATIENT)
Dept: MAMMOGRAPHY | Age: 72
Discharge: HOME | End: 2017-10-17
Attending: INTERNAL MEDICINE
Payer: MEDICARE

## 2017-10-17 DIAGNOSIS — Z12.31 VISIT FOR SCREENING MAMMOGRAM: ICD-10-CM

## 2017-10-17 PROCEDURE — 77067 SCR MAMMO BI INCL CAD: CPT

## 2017-10-19 DIAGNOSIS — R92.1 BREAST CALCIFICATIONS ON MAMMOGRAM: Primary | ICD-10-CM

## 2017-10-26 ENCOUNTER — HOSPITAL ENCOUNTER (OUTPATIENT)
Dept: MAMMOGRAPHY | Age: 72
Discharge: HOME | End: 2017-10-26
Attending: INTERNAL MEDICINE
Payer: MEDICARE

## 2017-10-26 DIAGNOSIS — R92.0 BREAST MICROCALCIFICATIONS: ICD-10-CM

## 2017-10-26 PROCEDURE — 77065 DX MAMMO INCL CAD UNI: CPT

## 2017-10-31 ENCOUNTER — HOSPITAL ENCOUNTER (OUTPATIENT)
Dept: MAMMOGRAPHY | Age: 72
Discharge: HOME | End: 2017-10-31
Attending: INTERNAL MEDICINE
Payer: MEDICARE

## 2017-10-31 VITALS — WEIGHT: 132 LBS | HEIGHT: 56 IN | BODY MASS INDEX: 29.69 KG/M2

## 2017-10-31 DIAGNOSIS — R92.0 MICROCALCIFICATION OF LEFT BREAST ON MAMMOGRAM: ICD-10-CM

## 2017-10-31 DIAGNOSIS — R92.0 BREAST MICROCALCIFICATIONS: ICD-10-CM

## 2017-10-31 PROCEDURE — 77065 DX MAMMO INCL CAD UNI: CPT

## 2017-10-31 PROCEDURE — 19081 BX BREAST 1ST LESION STRTCTC: CPT

## 2017-10-31 PROCEDURE — 74011000250 HC RX REV CODE- 250: Performed by: RADIOLOGY

## 2017-10-31 PROCEDURE — 88305 TISSUE EXAM BY PATHOLOGIST: CPT | Performed by: RADIOLOGY

## 2017-10-31 RX ORDER — LIDOCAINE HYDROCHLORIDE AND EPINEPHRINE 10; 10 MG/ML; UG/ML
1.5 INJECTION, SOLUTION INFILTRATION; PERINEURAL
Status: COMPLETED | OUTPATIENT
Start: 2017-10-31 | End: 2017-10-31

## 2017-10-31 RX ORDER — LIDOCAINE HYDROCHLORIDE 10 MG/ML
10 INJECTION INFILTRATION; PERINEURAL
Status: COMPLETED | OUTPATIENT
Start: 2017-10-31 | End: 2017-10-31

## 2017-10-31 RX ADMIN — LIDOCAINE HYDROCHLORIDE,EPINEPHRINE BITARTRATE 10 ML: 10; .01 INJECTION, SOLUTION INFILTRATION; PERINEURAL at 08:42

## 2017-10-31 RX ADMIN — LIDOCAINE HYDROCHLORIDE 8 ML: 10 INJECTION, SOLUTION INFILTRATION; PERINEURAL at 08:36

## 2017-10-31 NOTE — PROGRESS NOTES
Patient arrived for a Left breast stereotactic biopsy with clip placement. Pt arrived in [facility] ambulatory, alert and oriented. Pt tolerated biopsy well without complaint. Specimens obtained, x-rayed and placed in labeled specimen containers for pathology. Direct pressure applied to incision site, bleeding controlled, steri- strips applied and covered with gauze prior to post mammogram films for confirmation of clip placement. Clean dry gauze and Ice pack applied and bra on. Reviewed post procedure discharge instructions with patient. Patient verbalizes understanding and written copy given to patient.  Patient D/C'd at 9:02am.

## 2017-10-31 NOTE — IP AVS SNAPSHOT
[place] 
 
 
 [address] 
[phone] Patient: Dorothy Easton MRN: [id number] :1945 About your hospitalization You were admitted on:  2017 You last received care in the:  [facility] You were discharged on:  2017 Why you were hospitalized Your primary diagnosis was:  Not on File Discharge Orders None A check savana indicates which time of day the medication should be taken. My Medications ASK your physician about these medications Instructions Each Dose to Equal  
 Morning Noon Evening Bedtime  
 amLODIPine 5 mg tablet Commonly known as:  Wandra Ruth Your last dose was: Your next dose is: Take 1 Tab by mouth daily. 5 mg  
    
   
   
   
  
 atorvastatin 40 mg tablet Commonly known as:  LIPITOR Your last dose was: Your next dose is: Take 20 mg by mouth daily. 20 mg  
    
   
   
   
  
 levothyroxine 50 mcg tablet Commonly known as:  SYNTHROID Your last dose was: Your next dose is: Take 1 Tab by mouth Daily (before breakfast). 50 mcg  
    
   
   
   
  
 losartan 100 mg tablet Commonly known as:  COZAAR Your last dose was: Your next dose is: TAKE 1 TABLET BY MOUTH ONCE DAILY  
     
   
   
   
  
 metoprolol succinate 100 mg tablet Commonly known as:  TOPROL-XL Your last dose was: Your next dose is: Take 1 Tab by mouth daily. 100 mg OXYGEN-AIR DELIVERY SYSTEMS Your last dose was: Your next dose is: Take 3 L by inhalation continuous. 3 L  
    
   
   
   
  
 FABBY-DARLIN 0.8 mg Tab tablet Generic drug:  b complex-vitamin c-folic acid 0.8 mg Your last dose was: Your next dose is: Take 0.8 mg by mouth daily. 0.8 mg  
    
   
   
   
  
 RENVELA 800 mg Tab tab Generic drug:  sevelamer carbonate Your last dose was: Your next dose is: Take 800 mg by mouth three (3) times daily. Patient reports medication is taken five times daily with meals and snacks 800 mg  
    
   
   
   
  
 SYMBICORT 160-4.5 mcg/actuation Hfaa Generic drug:  budesonide-formoterol Your last dose was: Your next dose is: Take 2 Puffs by inhalation two (2) times a day. 2 Puff Discharge Instructions POST STEREOTACTIC BIOPSY INSTRUCTIONS 1. WOUND CARE: 
 -Please keep an ice pack on the biopsy site for 20 minutes each hour through the day of the procedure. May     discontinue ice at bedtime. Do not apply ice directly to skin, protect by using thin cloth covering. 
 
-The morning after biopsy, you may remove the gauze pad dressing. DO NOT REMOVE STERI-STRIPS(TAPE    STRIPS ON SKIN) FROM BIOPSY SITE. Allow them to wear off in 3-4 days. 2. Please wear your most supportive bra for 24 hours. Sleep in your bra the night of the biopsy. This will reduce the risk of bleeding, also help to hold ice pack in place. 3. You may shower after 24 hours, otherwise please keep site dry. Steri-strips may get wet. 4. If bleeding occurs from the site: 
    Apply direct pressure to the site, holding it tightly for 5 minutes If bleeding continues after 5 minutes, call your physician 5. NO HEAVY LIFTING (GREATER THAN 5 lbs), vacuuming and/or sports activities for 48 hours after biopsy 6. Your pathology results are usually complete within 48 hours of the procedure time, excluding weekends. 7. You may take TYLENOL extra strength for pain. NO aspirin or aspirin containing products. 8. Call your doctor for a follow up appointment. Introducing [facility]!    
 Jasson Mckinnon introduces CRAiLAR patient portal. Now you can access parts of your medical record, email your doctor's office, and request medication refills online. 1. In your internet browser, go to [email or web address] 2. Click on the First Time User? Click Here link in the Sign In box. You will see the New Member Sign Up page. 3. Enter your Blood cell Storage Access Code exactly as it appears below. You will not need to use this code after youve completed the sign-up process. If you do not sign up before the expiration date, you must request a new code. · Blood cell Storage Access Code: [email or web address] Expires: 1/15/2018  8:04 AM 
 
4. Enter the last four digits of your Social Security Number (xxxx) and Date of Birth (mm/dd/yyyy) as indicated and click Submit. You will be taken to the next sign-up page. 5. Create a Blood cell Storage ID. This will be your Blood cell Storage login ID and cannot be changed, so think of one that is secure and easy to remember. 6. Create a Blood cell Storage password. You can change your password at any time. 7. Enter your Password Reset Question and Answer. This can be used at a later time if you forget your password. 8. Enter your e-mail address. You will receive e-mail notification when new information is available in [address] 9. Click Sign Up. You can now view and download portions of your medical record. 10. Click the Download Summary menu link to download a portable copy of your medical information. If you have questions, please visit the Frequently Asked Questions section of the Blood cell Storage website. Remember, Blood cell Storage is NOT to be used for urgent needs. For medical emergencies, dial 911. Now available from your iPhone and Android! Providers Seen During Your Hospitalization Provider Specialty Primary office phone Lazaro Arevalo MD Internal Medicine [phone] Your Primary Care Physician (PCP) Primary Care Physician Office Phone Office Fax Isai Figueroa [phone] You are allergic to the following Allergen Reactions Ace Inhibitors Cough Aspirin Other (comments) Hallucinations Hydralazine Other (comments) Hallucinations, dizziness and numbness in legs Recent Documentation Height Weight BMI OB Status Smoking Status 1.422 m 59.9 kg 29.59 kg/m2 Postmenopausal Never Smoker Emergency Contacts Name Discharge Info Relation Home Work Mobile Pavan Farrar DISCHARGE CAREGIVER [3] Spouse [3] [phone] Patient Belongings The following personal items are in your possession at time of discharge: 
                             
 
  
  
 Please provide this summary of care documentation to your next provider. Signatures-by signing, you are acknowledging that this After Visit Summary has been reviewed with you and you have received a copy. Patient Signature:  ____________________________________________________________ Date:  ____________________________________________________________  
  
Bing Pop Provider Signature:  ____________________________________________________________ Date:  ____________________________________________________________

## 2017-11-03 DIAGNOSIS — I10 ESSENTIAL HYPERTENSION: Chronic | ICD-10-CM

## 2017-11-03 RX ORDER — METOPROLOL SUCCINATE 100 MG/1
100 TABLET, EXTENDED RELEASE ORAL DAILY
Qty: 30 TAB | Refills: 6 | Status: SHIPPED | OUTPATIENT
Start: 2017-11-03 | End: 2020-01-01 | Stop reason: DRUGHIGH

## 2017-12-12 DIAGNOSIS — I10 ESSENTIAL HYPERTENSION: ICD-10-CM

## 2017-12-12 RX ORDER — AMLODIPINE BESYLATE 5 MG/1
TABLET ORAL
Qty: 30 TAB | Refills: 5 | Status: SHIPPED | OUTPATIENT
Start: 2017-12-12 | End: 2018-07-14 | Stop reason: SDUPTHER

## 2017-12-18 DIAGNOSIS — N18.6 ESRD (END STAGE RENAL DISEASE) ON DIALYSIS (HCC): Primary | ICD-10-CM

## 2017-12-18 DIAGNOSIS — Z99.2 ESRD (END STAGE RENAL DISEASE) ON DIALYSIS (HCC): Primary | ICD-10-CM

## 2018-01-09 ENCOUNTER — OFFICE VISIT (OUTPATIENT)
Dept: FAMILY MEDICINE CLINIC | Age: 73
End: 2018-01-09

## 2018-01-09 VITALS
SYSTOLIC BLOOD PRESSURE: 176 MMHG | HEART RATE: 56 BPM | HEIGHT: 56 IN | WEIGHT: 132 LBS | RESPIRATION RATE: 16 BRPM | TEMPERATURE: 98.2 F | DIASTOLIC BLOOD PRESSURE: 63 MMHG | BODY MASS INDEX: 29.69 KG/M2

## 2018-01-09 DIAGNOSIS — I73.9 PVD (PERIPHERAL VASCULAR DISEASE) (HCC): ICD-10-CM

## 2018-01-09 DIAGNOSIS — E03.4 HYPOTHYROIDISM DUE TO ACQUIRED ATROPHY OF THYROID: Chronic | ICD-10-CM

## 2018-01-09 DIAGNOSIS — C64.2 RENAL CARCINOMA, LEFT (HCC): ICD-10-CM

## 2018-01-09 DIAGNOSIS — Z99.2 ESRD (END STAGE RENAL DISEASE) ON DIALYSIS (HCC): ICD-10-CM

## 2018-01-09 DIAGNOSIS — I10 ESSENTIAL HYPERTENSION: Chronic | ICD-10-CM

## 2018-01-09 DIAGNOSIS — J44.9 CHRONIC OBSTRUCTIVE PULMONARY DISEASE, UNSPECIFIED COPD TYPE (HCC): Chronic | ICD-10-CM

## 2018-01-09 DIAGNOSIS — N18.6 ESRD (END STAGE RENAL DISEASE) ON DIALYSIS (HCC): ICD-10-CM

## 2018-01-09 DIAGNOSIS — Z79.4 TYPE 2 DIABETES MELLITUS WITH HYPERGLYCEMIA, WITH LONG-TERM CURRENT USE OF INSULIN (HCC): Primary | ICD-10-CM

## 2018-01-09 DIAGNOSIS — E11.65 TYPE 2 DIABETES MELLITUS WITH HYPERGLYCEMIA, WITH LONG-TERM CURRENT USE OF INSULIN (HCC): Primary | ICD-10-CM

## 2018-01-09 DIAGNOSIS — E78.5 HYPERLIPIDEMIA, UNSPECIFIED HYPERLIPIDEMIA TYPE: Chronic | ICD-10-CM

## 2018-01-09 DIAGNOSIS — I50.9 CONGESTIVE HEART FAILURE, UNSPECIFIED CONGESTIVE HEART FAILURE CHRONICITY, UNSPECIFIED CONGESTIVE HEART FAILURE TYPE: ICD-10-CM

## 2018-01-09 PROBLEM — E11.21 TYPE 2 DIABETES MELLITUS WITH NEPHROPATHY (HCC): Status: ACTIVE | Noted: 2018-01-09

## 2018-01-09 NOTE — PATIENT INSTRUCTIONS

## 2018-01-09 NOTE — MR AVS SNAPSHOT
Visit Information Date & Time Provider Department Dept. Phone Encounter #  
 1/9/2018 10:00 AM Rich Davenport MD [facility] [id number] Follow-up Instructions Return in about 3 months (around 4/9/2018) for follow up. Your Appointments 3/19/2018 10:30 AM  
Office Visit with Rich Davenport MD  
[place] [address] ([address]) Appt Note: mwv  
 [address]  
  
   
 [address] Upcoming Health Maintenance Date Due  
 MEDICARE YEARLY EXAM 3/23/2018 HEMOGLOBIN A1C Q6M 4/5/2018 FOOT EXAM Q1 5/2/2018 LIPID PANEL Q1 5/2/2018 EYE EXAM RETINAL OR DILATED Q1 6/22/2018 MICROALBUMIN Q1 1/9/2019 GLAUCOMA SCREENING Q2Y 6/22/2019 BREAST CANCER SCRN MAMMOGRAM 10/26/2019 COLONOSCOPY 8/28/2020 DTaP/Tdap/Td series (2 - Td) 12/1/2026 Allergies as of 1/9/2018  Review Complete On: 1/9/2018 By: Rich Davenport MD  
  
 Severity Noted Reaction Type Reactions Ace Inhibitors  08/04/2015    Cough Aspirin  01/09/2013    Other (comments) Hallucinations Hydralazine  08/04/2015    Other (comments) Hallucinations, dizziness and numbness in legs Current Immunizations  Reviewed on 10/5/2017 Name Date Influenza High Dose Vaccine PF 9/12/2017 Influenza Vaccine 9/10/2016, 10/1/2009 Pneumococcal Conjugate (PCV-13) 8/14/2016 Pneumococcal Vaccine (Unspecified Type) 4/8/2010 Tdap 12/1/2016 Not reviewed this visit You Were Diagnosed With   
  
 Codes Comments Type 2 diabetes mellitus with hyperglycemia, with long-term current use of insulin (HCC)    -  Primary ICD-10-CM: E11.65, Z79.4 ICD-9-CM: 250.00, 790.29, V58.67 Essential hypertension     ICD-10-CM: I10 
ICD-9-CM: 401.9 Chronic obstructive pulmonary disease, unspecified COPD type ([facility]     ICD-10-CM: J44.9 ICD-9-CM: 771 Hypothyroidism due to acquired atrophy of thyroid     ICD-10-CM: E03.4 ICD-9-CM: 244.8, 246.8 Hyperlipidemia, unspecified hyperlipidemia type     ICD-10-CM: E78.5 ICD-9-CM: 272.4 ESRD (end stage renal disease) on dialysis ([facility]     ICD-10-CM: N18.6, Z99.2 ICD-9-CM: 585.6, V45.11 PVD (peripheral vascular disease) ([facility]     ICD-10-CM: I73.9 ICD-9-CM: 443.9 Congestive heart failure, unspecified congestive heart failure chronicity, unspecified congestive heart failure type ([facility]     ICD-10-CM: I50.9 ICD-9-CM: 428.0 Vitals BP Pulse Temp Resp Height(growth percentile) Weight(growth percentile) 176/63 (!) 56 98.2 °F (36.8 °C) (Oral) 16 4' 8\" (1.422 m) 132 lb (59.9 kg) BMI OB Status Smoking Status 29.59 kg/m2 Postmenopausal Never Smoker Vitals History BMI and BSA Data Body Mass Index Body Surface Area  
 29.59 kg/m 2 1.54 m 2 Preferred Pharmacy Pharmacy Name Phone [id number] [place] [address] [address] [facility] [place] [phone] Your Updated Medication List  
  
   
This list is accurate as of: 1/9/18 11:18 AM.  Always use your most recent med list. amLODIPine 5 mg tablet Commonly known as:  Lynnell Manual TAKE ONE TABLET BY MOUTH ONE TIME DAILY  
  
 atorvastatin 40 mg tablet Commonly known as:  LIPITOR Take 20 mg by mouth daily. levothyroxine 50 mcg tablet Commonly known as:  SYNTHROID Take 1 Tab by mouth Daily (before breakfast). losartan 100 mg tablet Commonly known as:  COZAAR  
TAKE 1 TABLET BY MOUTH ONCE DAILY  
  
 metoprolol succinate 100 mg tablet Commonly known as:  TOPROL-XL Take 1 Tab by mouth daily. OXYGEN-AIR DELIVERY SYSTEMS Take 3 L by inhalation continuous. FABBY-DARLIN 0.8 mg Tab tablet Generic drug:  b complex-vitamin c-folic acid 0.8 mg Take 0.8 mg by mouth daily. RENVELA 800 mg Tab tab Generic drug:  sevelamer carbonate Take 800 mg by mouth three (3) times daily. Patient reports medication is taken five times daily with meals and snacks SYMBICORT 160-4.5 mcg/actuation Hfaa Generic drug:  budesonide-formoterol Take 2 Puffs by inhalation two (2) times a day. We Performed the Following AMB POC HEMOGLOBIN A1C [48961 CPT(R)] Follow-up Instructions Return in about 3 months (around 4/9/2018) for follow up. Patient Instructions DASH Diet: Care Instructions Your Care Instructions The DASH diet is an eating plan that can help lower your blood pressure. DASH stands for Dietary Approaches to Stop Hypertension. Hypertension is high blood pressure. The DASH diet focuses on eating foods that are high in calcium, potassium, and magnesium. These nutrients can lower blood pressure. The foods that are highest in these nutrients are fruits, vegetables, low-fat dairy products, nuts, seeds, and legumes. But taking calcium, potassium, and magnesium supplements instead of eating foods that are high in those nutrients does not have the same effect. The DASH diet also includes whole grains, fish, and poultry. The DASH diet is one of several lifestyle changes your doctor may recommend to lower your high blood pressure. Your doctor may also want you to decrease the amount of sodium in your diet. Lowering sodium while following the DASH diet can lower blood pressure even further than just the DASH diet alone. Follow-up care is a key part of your treatment and safety. Be sure to make and go to all appointments, and call your doctor if you are having problems. It's also a good idea to know your test results and keep a list of the medicines you take. How can you care for yourself at home? Following the DASH diet · Eat 4 to 5 servings of fruit each day.  A serving is 1 medium-sized piece of fruit, ½ cup chopped or canned fruit, 1/4 cup dried fruit, or 4 ounces (½ cup) of fruit juice. Choose fruit more often than fruit juice. · Eat 4 to 5 servings of vegetables each day. A serving is 1 cup of lettuce or raw leafy vegetables, ½ cup of chopped or cooked vegetables, or 4 ounces (½ cup) of vegetable juice. Choose vegetables more often than vegetable juice. · Get 2 to 3 servings of low-fat and fat-free dairy each day. A serving is 8 ounces of milk, 1 cup of yogurt, or 1 ½ ounces of cheese. · Eat 6 to 8 servings of grains each day. A serving is 1 slice of bread, 1 ounce of dry cereal, or ½ cup of cooked rice, pasta, or cooked cereal. Try to choose whole-grain products as much as possible. · Limit lean meat, poultry, and fish to 2 servings each day. A serving is 3 ounces, about the size of a deck of cards. · Eat 4 to 5 servings of nuts, seeds, and legumes (cooked dried beans, lentils, and split peas) each week. A serving is 1/3 cup of nuts, 2 tablespoons of seeds, or ½ cup of cooked beans or peas. · Limit fats and oils to 2 to 3 servings each day. A serving is 1 teaspoon of vegetable oil or 2 tablespoons of salad dressing. · Limit sweets and added sugars to 5 servings or less a week. A serving is 1 tablespoon jelly or jam, ½ cup sorbet, or 1 cup of lemonade. · Eat less than 2,300 milligrams (mg) of sodium a day. If you limit your sodium to 1,500 mg a day, you can lower your blood pressure even more. Tips for success · Start small. Do not try to make dramatic changes to your diet all at once. You might feel that you are missing out on your favorite foods and then be more likely to not follow the plan. Make small changes, and stick with them. Once those changes become habit, add a few more changes. · Try some of the following: ¨ Make it a goal to eat a fruit or vegetable at every meal and at snacks. This will make it easy to get the recommended amount of fruits and vegetables each day. ¨ Try yogurt topped with fruit and nuts for a snack or healthy dessert. ¨ Add lettuce, tomato, cucumber, and onion to sandwiches. ¨ Combine a ready-made pizza crust with low-fat mozzarella cheese and lots of vegetable toppings. Try using tomatoes, squash, spinach, broccoli, carrots, cauliflower, and onions. ¨ Have a variety of cut-up vegetables with a low-fat dip as an appetizer instead of chips and dip. ¨ Sprinkle sunflower seeds or chopped almonds over salads. Or try adding chopped walnuts or almonds to cooked vegetables. ¨ Try some vegetarian meals using beans and peas. Add garbanzo or kidney beans to salads. Make burritos and tacos with mashed barrera beans or black beans. Where can you learn more? Go to http://mihai-zach.info/. Enter B902 in the search box to learn more about \"DASH Diet: Care Instructions. \" Current as of: September 21, 2016 Content Version: 11.4 © 7442-0878 [employer]. Care instructions adapted under license by [employer] (which disclaims liability or warranty for this information). If you have questions about a medical condition or this instruction, always ask your healthcare professional. [address] any warranty or liability for your use of this information. Introducing [facility] HE[facility]! [facility] introduces AllSource Analysis patient portal. Now you can access parts of your medical record, email your doctor's office, and request medication refills online. 1. In your internet browser, go to [email or web address] ([email or web address] 2. Click on the First Time User? Click Here link in the Sign In box. You will see the New Member Sign Up page. 3. Enter your AllSource Analysis Access Code exactly as it appears below. You will not need to use this code after youve completed the sign-up process. If you do not sign up before the expiration date, you must request a new code. · AllSource Analysis Access Code: [email or web address] Expires: 1/15/2018  7:04 AM 
 
 4. Enter the last four digits of your Social Security Number (xxxx) and Date of Birth (mm/dd/yyyy) as indicated and click Submit. You will be taken to the next sign-up page. 5. Create a Napartner ID. This will be your Napartner login ID and cannot be changed, so think of one that is secure and easy to remember. 6. Create a Napartner password. You can change your password at any time. 7. Enter your Password Reset Question and Answer. This can be used at a later time if you forget your password. 8. Enter your e-mail address. You will receive e-mail notification when new information is available in [address] 9. Click Sign Up. You can now view and download portions of your medical record. 10. Click the Download Summary menu link to download a portable copy of your medical information. If you have questions, please visit the Frequently Asked Questions section of the Napartner website. Remember, Napartner is NOT to be used for urgent needs. For medical emergencies, dial 911. Now available from your iPhone and Android! Please provide this summary of care documentation to your next provider. Your primary care clinician is listed as Sade Squibb. If you have any questions after today's visit, please call [phone].

## 2018-01-09 NOTE — PROGRESS NOTES
Chief Complaint   Patient presents with    Follow Up Chronic Condition     3 month       Pt is a 74y.o. year old female who presents for follow up of her chronic medical problems    Health Maintenance Due   Topic Date Due    MICROALBUMIN Q1  2018     BP Readings from Last 3 Encounters:   18 176/63   10/05/17 178/62   17 146/51   high and low at dialysis  Repeat BP-still elevated  Compliant with meds -told not to take Metoprolol during dialysis days bec BP goes down    Lab Results   Component Value Date/Time    TSH 2.680 10/05/2017 12:45 PM   Has to take Synthroid it 4 hrs after Renvela      Last Point of Care HGB A1C  Hemoglobin A1c (POC)   Date Value Ref Range Status   10/05/2017 5.2 % Final   Off Lantus  Blood sugars at home ok-per     BMI 29  Wt Readings from Last 3 Encounters:   18 132 lb (59.9 kg)   10/31/17 132 lb (59.9 kg)   10/05/17 132 lb (59.9 kg)     Lab Results   Component Value Date/Time    Cholesterol, total 171 2017 11:07 AM    HDL Cholesterol 87 2017 11:07 AM    LDL, calculated 71 2017 11:07 AM    VLDL, calculated 13 2017 11:07 AM    Triglyceride 63 2017 11:07 AM    CHOL/HDL Ratio 2.3 2010 02:20 PM       Lab Results   Component Value Date/Time    Calcium 10.3 10/05/2017 12:45 PM    Phosphorus 4.9 10/09/2016 10:08 AM       Still on hemodialysis 3x a week  No longer produces urine    Hx of left renal cell carcinoma-s/p nephrectomy in ; \"small spot\" per     CHF-EF 25 % in   Sees Dr. Naa Oleary for years once a year   Medina Lees MD - 2016 1:00 PM EDT  Formatting of this note may be different from the original.  [facility]  Office Visit Note  Patient: Savanna Collins   : 1945   MRN: [id number]   2016, 1:03 PM   Identifier:  Savanna Collins is a 65 y.o. female seen 2016 for CHF  Chief Complaint   Patient presents with    CHF   Impression:  Patient Active Problem List   Diagnosis Date Noted    Hypertensive heart disease with congestive heart failure ([facility] [I11.0, I50.9] 11/11/2013   Priority: A   1. Echo 2012--EF 50-55%, moderate aortic and mild-to-moderate mitral regurgitation, RVSP 40 mmHg  2. Echo 3/26/14--EF 55%, mild AR, mod MR, RVSP 40-46   Aortic valve disease [I35.9] 11/11/2013   Priority: B   · Aortic regurgitation and aortic stenosis   · Moderate aortic regurgitation on echo 2005 and 11/2012  · Echo 3/26/2014--EF 55%, mild-moderate AR, mild AS, peak 26 mmHg, mean 10 mmHg, area 1.5 cm2   Mitral regurgitation [I34.0] 11/11/2013   Priority: B   · Mild on echo 2005, mild-moderate 2012  · Echo 3/26/2014--EF 55%, moderate mitral regurgitation, mild TR, RVSP 40-45 mmHg   Type 2 diabetes mellitus ([facility] [E11.9] 08/27/2012   Priority: C    Essential hypertension [I10] 08/27/2012   Priority: C    Hypercholesterolemia [E78.0] 05/21/2012   Priority: C    Hypothyroidism [E03.9] 08/27/2012   Priority: D    CKD (chronic kidney disease) stage 5, GFR less than 15 ml/min ([address] [N18.5] 08/26/2015   Priority: E   · Single kidney after nephrectomy for renal cell carcinoma on the right   Pulmonary hypertension ([address] [I27.2] 11/11/2013   Priority: F   · RVSP 54 2005  · RVSP 40 2012   MELANIE (obstructive sleep apnea) [G47.33] 11/11/2013   Priority: F    Diabetes mellitus due to underlying condition with diabetic chronic kidney disease ([address] [E08.22] 12/15/2015    Hypothyroidism following radioiodine therapy [E89.0] 12/15/2015   Recommendations:  Ms. Saint Clair was seen 6/9/2016 for the following conditions with recommendations as follows:  Hypertensive heart disease with congestive heart failure ([address]  blood pressure seems to be controlled outside the office. I have encouraged her take her clonidine dose. She will continue monitor this. We will see her back here in 6 months. Sooner if symptoms require. Aortic valve disease  echo cardiac gram is being completed today.  We will contact her with results. Given her chronic kidney disease it is unlikely that any intervention to the aortic valve will alter her prognosis favorably. If she ends up on dialysis then further valve treatments could be considered if required. Essential hypertension  blood pressure suboptimally controlled. Continue clonidine therapy and other medications as described below. Hypercholesterolemia  statin treatment and follow-up labs with primary care provider  CKD (chronic kidney disease) stage 5, GFR less than 15 ml/min ([facility])  continue follow-up with nephrology (Dr. Meagan Bonner)  Return in about 6 months (around 12/9/2016), or if symptoms worsen or fail to improve. COPD-sees Dr. Zhane Beebe, on symbicort; on home O2 prn  Told no sleep apnea    PVD-? Cataract surgery since last visit, both eyes done- successful    ROS:    Pt denies: Wt loss, Fever/Chills, HA, Visual changes, Fatigue, Chest pain, SOB, LEBLANC, Abd pain, N/V/D/C, Blood in stool or urine, Edema. Pertinent positive as above in HPI.  All others were negative    Patient Active Problem List   Diagnosis Code    Gout M10.9    Hyperlipidemia E78.5    Tubular adenoma of colon D12.6    Anemia in chronic kidney disease N18.9, D63.1    Chronic obstructive pulmonary disease ([address] J44.9    MELANIE on CPAP G47.33, Z99.89    CHF (congestive heart failure) ([facility]) I50.9    Type 2 diabetes mellitus with hyperglycemia, with long-term current use of insulin ([facility]) E11.65, Z79.4    Advance directive discussed with patient Z70.80    Essential hypertension I10    Hypothyroidism due to acquired atrophy of thyroid E03.4    ESRD (end stage renal disease) on dialysis ([address] N18.6, Z99.2    Type 2 diabetes mellitus with nephropathy ([address] E11.21       Past Medical History:   Diagnosis Date    Anemia in CKD (chronic kidney disease)     Aortic stenosis     Asthma     Calculus of kidney     Cancer ([address]     right kidney    Cardiomyopathy ([address]     EF 20-25% (10/16), 55% (06/16)    Chronic kidney disease     dialysis MWF    Chronic kidney disease, stage V ([facility]     Diabetes ([facility]     Dialysis patient ([facility]     Dialysis patient ([facility]     M-W-F; LEFT AVF; PERM CATH RIGHT CHEST    Gastrointestinal hemorrhage associated with peptic ulcer     Gastrointestinal hemorrhage associated with peptic ulcer 10/5/2016    Gout     Hypercholesterolemia     Hypertension     Primary hyperparathyroidism ([facility] 2010    s/p parathyroid adenoma excision    Secondary hyperparathyroidism (of renal origin)     Sleep apnea     C PAP    Status post nephrectomy     right    Thyroid disease        Current Outpatient Prescriptions   Medication Sig Dispense Refill    amLODIPine (NORVASC) 5 mg tablet TAKE ONE TABLET BY MOUTH ONE TIME DAILY  30 Tab 5    metoprolol succinate (TOPROL-XL) 100 mg tablet Take 1 Tab by mouth daily. 30 Tab 6    levothyroxine (SYNTHROID) 50 mcg tablet Take 1 Tab by mouth Daily (before breakfast). 90 Tab 1    losartan (COZAAR) 100 mg tablet TAKE 1 TABLET BY MOUTH ONCE DAILY 30 Tab 6    RENVELA 800 mg tab tab Take 800 mg by mouth three (3) times daily. Patient reports medication is taken five times daily with meals and snacks  11    FABBY-DARLIN 0.8 mg tab tablet Take 0.8 mg by mouth daily. 11    OXYGEN-AIR DELIVERY SYSTEMS Take 3 L by inhalation continuous.  atorvastatin (LIPITOR) 40 mg tablet Take 20 mg by mouth daily. 5    budesonide-formoterol (SYMBICORT) 160-4.5 mcg/actuation HFA inhaler Take 2 Puffs by inhalation two (2) times a day.            History   Smoking Status    Never Smoker   Smokeless Tobacco    Never Used       Allergies   Allergen Reactions    Ace Inhibitors Cough    Aspirin Other (comments)     Hallucinations    Hydralazine Other (comments)     Hallucinations, dizziness and numbness in legs        Patient Labs were reviewed: yes      Patient Past Records were reviewed:  yes        Objective:     Vitals:    01/09/18 1007   BP: 176/63   Pulse: (!) 56   Resp: 16   Temp: 98.2 °F (36.8 °C)   TempSrc: Oral   Weight: 132 lb (59.9 kg)   Height: 4' 8\" (1.422 m)     Body mass index is 29.59 kg/(m^2). Exam:   Appearance: alert, well appearing,  oriented to person, place, and time, acyanotic, in no respiratory distress and well hydrated. HEENT:  NC/AT, pink conj, anicteric sclerae  Neck:  No cervical lymphadenopathy, no JVD, no thyromegaly, no carotid bruit  Heart:  RRR without M/R/G, positive for murmur  Lungs:  CTAB, no rhonchi, rales, or wheezes with good air exchange   Abdomen:  Non-tender, pos bowel sounds, no hepatosplenomegaly  Ext:  No C/C/E, LUE bruit from the AV fistula    Skin: no rash  Neuro: no lateralizing signs, CNs II-XII intact    Last Point of Care HGB A1C  Hemoglobin A1c (POC)   Date Value Ref Range Status   10/05/2017 5.2 % Final      Assessment/ Plan:   Diagnoses and all orders for this visit:    1. Type 2 diabetes mellitus with hyperglycemia, with long-term current use of insulin (HCC)-off meds, continue with watching diet  -     AMB POC HEMOGLOBIN A1C    2. Essential hypertension-uncontrolled; advised to discuss this with Renal as  says whenever I adjust her meds, Renal undoes it; she is also seeing Cardio shortly; continue with Toprol    3. Chronic obstructive pulmonary disease, unspecified COPD type (HCC)-asymptomatic on current inhalers including Symbicort, Dr. Bharath Franklin following    4. Hypothyroidism due to acquired atrophy of thyroid-on Synthroid  Lab Results   Component Value Date/Time    TSH 2.680 10/05/2017 12:45 PM     Wt Readings from Last 3 Encounters:   01/09/18 132 lb (59.9 kg)   10/31/17 132 lb (59.9 kg)   10/05/17 132 lb (59.9 kg)       5.  Hyperlipidemia, unspecified hyperlipidemia type-at goal on Lipitor  Lab Results   Component Value Date/Time    Cholesterol, total 171 05/02/2017 11:07 AM    HDL Cholesterol 87 05/02/2017 11:07 AM    LDL, calculated 71 05/02/2017 11:07 AM    VLDL, calculated 13 05/02/2017 11:07 AM Triglyceride 63 05/02/2017 11:07 AM    CHOL/HDL Ratio 2.3 09/29/2010 02:20 PM       6. ESRD (end stage renal disease) on dialysis ([facility])-has done really well after dialysis was started    7. PVD (peripheral vascular disease) ([facility])-per , patient does not have this; I have also reviewed the note from Micah Frazier on 8/2017 and there was no mention of this; they saw her for the Left AVG    8. Congestive heart failure, unspecified congestive heart failure chronicity, unspecified congestive heart failure type ([facility])-will be seeing Cardio shortly; currently asymptomatic  Cardio note reviewed: also with AS and AR        Follow-up Disposition:  Return in about 3 months (around 4/9/2018) for follow up. I have discussed the diagnosis with the patient and the intended plan as seen in the above orders. The patient has received an After-Visit Summary and questions were answered concerning future plans. Medication Side Effects and Warnings were discussed with patient: yes    Patient verbalized understanding of above instructions.     Gabe Valderrama MD  Internal Medicine  [facility]

## 2018-01-09 NOTE — PROGRESS NOTES
1. Have you been to the ER, urgent care clinic since your last visit? Hospitalized since your last visit? No    2. Have you seen or consulted any other health care providers outside of the [address] [place] [address] since your last visit? Include any pap smears or colon screening.  No

## 2018-01-15 PROBLEM — C64.2 RENAL CARCINOMA, LEFT (HCC): Status: RESOLVED | Noted: 2018-01-15 | Resolved: 2018-01-15

## 2018-01-15 PROBLEM — C64.2 RENAL CARCINOMA, LEFT (HCC): Status: ACTIVE | Noted: 2018-01-15

## 2018-01-29 RX ORDER — LOSARTAN POTASSIUM 100 MG/1
TABLET ORAL
Qty: 30 TAB | Refills: 5 | Status: SHIPPED | OUTPATIENT
Start: 2018-01-29 | End: 2018-11-01 | Stop reason: SDUPTHER

## 2018-03-26 ENCOUNTER — TELEPHONE (OUTPATIENT)
Dept: FAMILY MEDICINE CLINIC | Age: 73
End: 2018-03-26

## 2018-05-03 ENCOUNTER — TELEPHONE (OUTPATIENT)
Dept: FAMILY MEDICINE CLINIC | Age: 73
End: 2018-05-03

## 2018-05-04 DIAGNOSIS — Z79.4 TYPE 2 DIABETES MELLITUS WITH HYPERGLYCEMIA, WITH LONG-TERM CURRENT USE OF INSULIN (HCC): ICD-10-CM

## 2018-05-04 DIAGNOSIS — E11.65 TYPE 2 DIABETES MELLITUS WITH HYPERGLYCEMIA, WITH LONG-TERM CURRENT USE OF INSULIN (HCC): ICD-10-CM

## 2018-05-04 DIAGNOSIS — J44.9 CHRONIC OBSTRUCTIVE PULMONARY DISEASE, UNSPECIFIED COPD TYPE (HCC): Primary | Chronic | ICD-10-CM

## 2018-05-10 ENCOUNTER — OFFICE VISIT (OUTPATIENT)
Dept: FAMILY MEDICINE CLINIC | Age: 73
End: 2018-05-10

## 2018-05-10 VITALS
HEIGHT: 56 IN | HEART RATE: 73 BPM | TEMPERATURE: 98 F | OXYGEN SATURATION: 94 % | SYSTOLIC BLOOD PRESSURE: 159 MMHG | BODY MASS INDEX: 30.82 KG/M2 | RESPIRATION RATE: 19 BRPM | WEIGHT: 137 LBS | DIASTOLIC BLOOD PRESSURE: 65 MMHG

## 2018-05-10 DIAGNOSIS — Z79.4 TYPE 2 DIABETES MELLITUS WITH HYPERGLYCEMIA, WITH LONG-TERM CURRENT USE OF INSULIN (HCC): ICD-10-CM

## 2018-05-10 DIAGNOSIS — I10 ESSENTIAL HYPERTENSION: Chronic | ICD-10-CM

## 2018-05-10 DIAGNOSIS — E03.4 HYPOTHYROIDISM DUE TO ACQUIRED ATROPHY OF THYROID: Chronic | ICD-10-CM

## 2018-05-10 DIAGNOSIS — Z99.2 ESRD (END STAGE RENAL DISEASE) ON DIALYSIS (HCC): ICD-10-CM

## 2018-05-10 DIAGNOSIS — M89.9 DISORDER OF BONE AND CARTILAGE: ICD-10-CM

## 2018-05-10 DIAGNOSIS — E78.5 HYPERLIPIDEMIA, UNSPECIFIED HYPERLIPIDEMIA TYPE: Chronic | ICD-10-CM

## 2018-05-10 DIAGNOSIS — E11.65 TYPE 2 DIABETES MELLITUS WITH HYPERGLYCEMIA, WITH LONG-TERM CURRENT USE OF INSULIN (HCC): ICD-10-CM

## 2018-05-10 DIAGNOSIS — Z71.89 ADVANCE DIRECTIVE DISCUSSED WITH PATIENT: ICD-10-CM

## 2018-05-10 DIAGNOSIS — N18.6 ESRD (END STAGE RENAL DISEASE) ON DIALYSIS (HCC): ICD-10-CM

## 2018-05-10 DIAGNOSIS — Z00.00 MEDICARE ANNUAL WELLNESS VISIT, SUBSEQUENT: Primary | ICD-10-CM

## 2018-05-10 DIAGNOSIS — M10.00 IDIOPATHIC GOUT, UNSPECIFIED CHRONICITY, UNSPECIFIED SITE: Chronic | ICD-10-CM

## 2018-05-10 DIAGNOSIS — M94.9 DISORDER OF BONE AND CARTILAGE: ICD-10-CM

## 2018-05-10 LAB — HBA1C MFR BLD HPLC: 5.5 %

## 2018-05-10 RX ORDER — ATORVASTATIN CALCIUM 40 MG/1
20 TABLET, FILM COATED ORAL DAILY
Qty: 90 TAB | Refills: 3 | Status: SHIPPED | OUTPATIENT
Start: 2018-05-10 | End: 2019-05-16 | Stop reason: SDUPTHER

## 2018-05-10 NOTE — PATIENT INSTRUCTIONS

## 2018-05-10 NOTE — MR AVS SNAPSHOT
Taisha Stanford [place] [address] [facility] [address] [facility] [address] [id number] 
[phone] Patient: Amrita Ferguson MRN: [id number] :1945 Visit Information Date & Time Provider Department Dept. Phone Encounter #  
 5/10/2018  9:15 AM Lolita George MD Fabiola Jarrell [id number] Follow-up Instructions Return in about 3 months (around 8/10/2018) for follow up. Upcoming Health Maintenance Date Due  
 MEDICARE YEARLY EXAM 3/23/2018 HEMOGLOBIN A1C Q6M 2018 FOOT EXAM Q1 2018 LIPID PANEL Q1 2018 EYE EXAM RETINAL OR DILATED Q1 2018 Influenza Age 5 to Adult 2018 MICROALBUMIN Q1 2019 GLAUCOMA SCREENING Q2Y 2019 BREAST CANCER SCRN MAMMOGRAM 10/26/2019 COLONOSCOPY 2020 DTaP/Tdap/Td series (2 - Td) 2026 Allergies as of 5/10/2018  Review Complete On: 5/10/2018 By: Sujit Vega LPN Severity Noted Reaction Type Reactions Ace Inhibitors  2015    Cough Aspirin  2013    Other (comments) Hallucinations Hydralazine  2015    Other (comments) Hallucinations, dizziness and numbness in legs Current Immunizations  Reviewed on 5/10/2018 Name Date Influenza High Dose Vaccine PF 2017 Influenza Vaccine 9/10/2016, 10/1/2009 Pneumococcal Conjugate (PCV-13) 2016 Pneumococcal Vaccine (Unspecified Type) 2010 Tdap 2016 Reviewed by Lolita George MD on 5/10/2018 at 10:03 AM  
You Were Diagnosed With   
  
 Codes Comments Medicare annual wellness visit, subsequent    -  Primary ICD-10-CM: Z00.00 ICD-9-CM: V70.0 Type 2 diabetes mellitus with hyperglycemia, with long-term current use of insulin (HCC)     ICD-10-CM: E11.65, Z79.4 ICD-9-CM: 250.00, 790.29, V58.67 Hyperlipidemia, unspecified hyperlipidemia type     ICD-10-CM: E78.5 ICD-9-CM: 272.4 Idiopathic gout, unspecified chronicity, unspecified site     ICD-10-CM: M10.00 ICD-9-CM: 274.9 Essential hypertension     ICD-10-CM: I10 
ICD-9-CM: 401.9 ESRD (end stage renal disease) on dialysis ([facility]     ICD-10-CM: N18.6, Z99.2 ICD-9-CM: 585.6, V45.11 Hypothyroidism due to acquired atrophy of thyroid     ICD-10-CM: E03.4 ICD-9-CM: 244.8, 246.8 Advance directive discussed with patient     ICD-10-CM: Z71.89 ICD-9-CM: V65.49 Disorder of bone and cartilage     ICD-10-CM: M89.9, M94.9 ICD-9-CM: 733.90 Vitals BP Pulse Temp Resp Height(growth percentile) Weight(growth percentile) 159/65 73 98 °F (36.7 °C) (Oral) 19 4' 8\" (1.422 m) 137 lb (62.1 kg) SpO2 BMI OB Status Smoking Status 94% 30.71 kg/m2 Postmenopausal Never Smoker Vitals History BMI and BSA Data Body Mass Index Body Surface Area 30.71 kg/m 2 1.57 m 2 Preferred Pharmacy Pharmacy Name Phone [facility] [address] [place] [address] Cory Elena Your Updated Medication List  
  
   
This list is accurate as of 5/10/18 10:20 AM.  Always use your most recent med list. amLODIPine 5 mg tablet Commonly known as:  Unknown Elfrida TAKE ONE TABLET BY MOUTH ONE TIME DAILY  
  
 atorvastatin 40 mg tablet Commonly known as:  LIPITOR Take 0.5 Tabs by mouth daily. levothyroxine 50 mcg tablet Commonly known as:  SYNTHROID Take 1 Tab by mouth Daily (before breakfast). losartan 100 mg tablet Commonly known as:  COZAAR  
TAKE ONE TABLET BY MOUTH ONE TIME DAILY  
  
 metoprolol succinate 100 mg tablet Commonly known as:  TOPROL-XL Take 1 Tab by mouth daily. OXYGEN-AIR DELIVERY SYSTEMS Take 3 L by inhalation continuous. FABBY-DARLIN 0.8 mg Tab tablet Generic drug:  b complex-vitamin c-folic acid 0.8 mg Take 0.8 mg by mouth daily. RENVELA 800 mg Tab tab Generic drug:  sevelamer carbonate Take 800 mg by mouth three (3) times daily. Patient reports medication is taken five times daily with meals and snacks SYMBICORT 160-4.5 mcg/actuation Hfaa Generic drug:  budesonide-formoterol Take 2 Puffs by inhalation two (2) times a day. Prescriptions Sent to Pharmacy Refills  
 atorvastatin (LIPITOR) 40 mg tablet 3 Sig: Take 0.5 Tabs by mouth daily. Class: Normal  
 Pharmacy: [facility] [address] [place] [address] #: [phone] Route: Oral  
  
We Performed the Following AMB POC HEMOGLOBIN A1C [52229 CPT(R)] HM DIABETES FOOT EXAM [HM7 Custom] Follow-up Instructions Return in about 3 months (around 8/10/2018) for follow up. To-Do List   
 05/10/2018 Lab:  CBC WITH AUTOMATED DIFF   
  
 05/10/2018 Lab:  LIPID PANEL   
  
 05/10/2018 Lab:  METABOLIC PANEL, COMPREHENSIVE   
  
 05/10/2018 Lab:  TSH 3RD GENERATION   
  
 05/10/2018 Lab:  URIC ACID   
  
 05/13/2018 Imaging:  DEXA BONE DENSITY STUDY AXIAL Patient Instructions Medicare Wellness Visit, Female The best way to live healthy is to have a healthy lifestyle by eating a well-balanced diet, exercising regularly, limiting alcohol and stopping smoking. Regular physical exams and screening tests are another way to keep healthy. Preventive exams provided by your health care provider can find health problems before they become diseases or illnesses. Preventive services including immunizations, screening tests, monitoring and exams can help you take care of your own health. All people over age 72 should have a pneumovax  and and a prevnar shot to prevent pneumonia. These are once in a lifetime unless you and your provider decide differently. All people over 65 should have a yearly flu shot and a tetanus vaccine every 10 years. A bone mass density to screen for osteoporosis or thinning of the bones should be done every 2 years after 65. Screening for diabetes mellitus with a blood sugar test should be done every year. Glaucoma is a disease of the eye due to increased ocular pressure that can lead to blindness and it should be done every year by an eye professional. 
 
Cardiovascular screening tests that check for elevated lipids (fatty part of blood) which can lead to heart disease and strokes should be done every 5 years. Colorectal screening that evaluates for blood or polyps in your colon should be done yearly as a stool test or every five years as a flexible sigmoidoscope or every 10 years as a colonoscopy up to age 76. Breast cancer screening with a mammogram is recommended biennially  for women age 54-69. Screening for cervical cancer with a pap smear and pelvic exam is recommended for women after age 72 years every 2 years up to age 79 or when the provider and patient decide to stop. If there is a history of cervical abnormalities or other increased risk for cancer then the test is recommended yearly. Hepatitis C screening is also recommended for anyone born between [birth year] through [address]. A shingles vaccine is also recommended once in a lifetime after age 61. Your Medicare Wellness Exam is recommended annually. Here is a list of your current Health Maintenance items with a due date: 
Health Maintenance Due Topic Date Due  
 Annual Well Visit  03/23/2018  Hemoglobin A1C    04/05/2018  Diabetic Foot Care  05/02/2018  Cholesterol Test   05/02/2018 Medicare Wellness Visit, Female The best way to live healthy is to have a healthy lifestyle by eating a well-balanced diet, exercising regularly, limiting alcohol and stopping smoking. Regular physical exams and screening tests are another way to keep healthy.  Preventive exams provided by your health care provider can find health problems before they become diseases or illnesses. Preventive services including immunizations, screening tests, monitoring and exams can help you take care of your own health. All people over age 72 should have a pneumovax  and and a prevnar shot to prevent pneumonia. These are once in a lifetime unless you and your provider decide differently. All people over 65 should have a yearly flu shot and a tetanus vaccine every 10 years. A bone mass density to screen for osteoporosis or thinning of the bones should be done every 2 years after 65. Screening for diabetes mellitus with a blood sugar test should be done every year. Glaucoma is a disease of the eye due to increased ocular pressure that can lead to blindness and it should be done every year by an eye professional. 
 
Cardiovascular screening tests that check for elevated lipids (fatty part of blood) which can lead to heart disease and strokes should be done every 5 years. Colorectal screening that evaluates for blood or polyps in your colon should be done yearly as a stool test or every five years as a flexible sigmoidoscope or every 10 years as a colonoscopy up to age 76. Breast cancer screening with a mammogram is recommended biennially  for women age 54-69. Screening for cervical cancer with a pap smear and pelvic exam is recommended for women after age 72 years every 2 years up to age 79 or when the provider and patient decide to stop. If there is a history of cervical abnormalities or other increased risk for cancer then the test is recommended yearly. Hepatitis C screening is also recommended for anyone born between [birth year] through [address]. A shingles vaccine is also recommended once in a lifetime after age 61. Your Medicare Wellness Exam is recommended annually. Here is a list of your current Health Maintenance items with a due date: 
Health Maintenance Due Topic Date Due  
 Annual Well Visit  03/23/2018  Hemoglobin A1C    04/05/2018  Diabetic Foot Care  05/02/2018  Cholesterol Test   05/02/2018 Introducing [address] Amber Peoples introduces [employer] patient portal. Now you can access parts of your medical record, email your doctor's office, and request medication refills online. 1. In your internet browser, go to [email or web address] 2. Click on the First Time User? Click Here link in the Sign In box. You will see the New Member Sign Up page. 3. Enter your [employer] Access Code exactly as it appears below. You will not need to use this code after youve completed the sign-up process. If you do not sign up before the expiration date, you must request a new code. · [employer] Access Code: [email or web address] Expires: 8/8/2018 10:19 AM 
 
4. Enter the last four digits of your Social Security Number (xxxx) and Date of Birth (mm/dd/yyyy) as indicated and click Submit. You will be taken to the next sign-up page. 5. Create a [employer] ID. This will be your [employer] login ID and cannot be changed, so think of one that is secure and easy to remember. 6. Create a [employer] password. You can change your password at any time. 7. Enter your Password Reset Question and Answer. This can be used at a later time if you forget your password. 8. Enter your e-mail address. You will receive e-mail notification when new information is available in [address] 9. Click Sign Up. You can now view and download portions of your medical record. 10. Click the Download Summary menu link to download a portable copy of your medical information. If you have questions, please visit the Frequently Asked Questions section of the [employer] website. Remember, [employer] is NOT to be used for urgent needs. For medical emergencies, dial 911. Now available from your iPhone and Android! Please provide this summary of care documentation to your next provider. Your primary care clinician is listed as Mansoor Weinstein. If you have any questions after today's visit, please call [phone].

## 2018-05-10 NOTE — PROGRESS NOTES
Chief Complaint   Patient presents with    Annual Wellness Visit     Check A1C, patient is fasting for labs if neeeded       Pt is a 74y.o. year old female who presents for follow up of her chronic medical problems    Lab Results   Component Value Date/Time    TSH 2.680 10/05/2017 12:45 PM       Goes to dialysis 3x a week  Feeling better  BP low during dialysis    No longer on a walker/wheelchair/cane-now ambulating on her own     Last Point of Care HGB A1C  Hemoglobin A1c (POC)   Date Value Ref Range Status   05/10/2018 5.5 % Final    Off meds  ROS:    Pt denies: Wt loss, Fever/Chills, HA, Visual changes, Fatigue, Chest pain, SOB, LEBLANC, Abd pain, N/V/D/C, Blood in stool or urine, Edema. Pertinent positive as above in HPI.  All others were negative    Patient Active Problem List   Diagnosis Code    Gout M10.9    Hyperlipidemia E78.5    Tubular adenoma of colon D12.6    Anemia in chronic kidney disease N18.9, D63.1    MELANIE on CPAP G47.33, Z99.89    CHF (congestive heart failure) ([facility]) I50.9    Type 2 diabetes mellitus with hyperglycemia, with long-term current use of insulin ([facility]) E11.65, Z79.4    Advance directive discussed with patient Z70.80    Essential hypertension I10    Hypothyroidism due to acquired atrophy of thyroid E03.4    ESRD (end stage renal disease) on dialysis ([address] N18.6, Z99.2    Type 2 diabetes mellitus with nephropathy ([facility]) E11.21    Chronic obstructive pulmonary disease ([facility]) J44.9       Past Medical History:   Diagnosis Date    Anemia in CKD (chronic kidney disease)     Aortic stenosis     Asthma     Calculus of kidney     Cancer ([address]     right kidney    Cardiomyopathy ([address]     EF 20-25% (10/16), 55% (06/16)    Chronic kidney disease     dialysis MWF    Chronic kidney disease, stage V ([address]     Diabetes ([address]     Dialysis patient ([address]     Dialysis patient ([address]     M-W-F; LEFT AVF; PERM CATH RIGHT CHEST    Gastrointestinal hemorrhage associated with peptic ulcer     Gastrointestinal hemorrhage associated with peptic ulcer 10/5/2016    Gout     Hypercholesterolemia     Hypertension     Primary hyperparathyroidism ([facility] 2010    s/p parathyroid adenoma excision    Secondary hyperparathyroidism (of renal origin)     Sleep apnea     C PAP    Status post nephrectomy     right    Thyroid disease        Current Outpatient Prescriptions   Medication Sig Dispense Refill    losartan (COZAAR) 100 mg tablet TAKE ONE TABLET BY MOUTH ONE TIME DAILY  30 Tab 5    amLODIPine (NORVASC) 5 mg tablet TAKE ONE TABLET BY MOUTH ONE TIME DAILY  30 Tab 5    metoprolol succinate (TOPROL-XL) 100 mg tablet Take 1 Tab by mouth daily. 30 Tab 6    levothyroxine (SYNTHROID) 50 mcg tablet Take 1 Tab by mouth Daily (before breakfast). 90 Tab 1    RENVELA 800 mg tab tab Take 800 mg by mouth three (3) times daily. Patient reports medication is taken five times daily with meals and snacks  11    FABBY-DARLIN 0.8 mg tab tablet Take 0.8 mg by mouth daily. 11    OXYGEN-AIR DELIVERY SYSTEMS Take 3 L by inhalation continuous.  atorvastatin (LIPITOR) 40 mg tablet Take 20 mg by mouth daily. 5    budesonide-formoterol (SYMBICORT) 160-4.5 mcg/actuation HFA inhaler Take 2 Puffs by inhalation two (2) times a day. History   Smoking Status    Never Smoker   Smokeless Tobacco    Never Used       Allergies   Allergen Reactions    Ace Inhibitors Cough    Aspirin Other (comments)     Hallucinations    Hydralazine Other (comments)     Hallucinations, dizziness and numbness in legs        Patient Labs were reviewed: yes      Patient Past Records were reviewed:  yes        Objective:     Vitals:    05/10/18 0922   BP: 159/65   Pulse: 73   Resp: 19   Temp: 98 °F (36.7 °C)   TempSrc: Oral   SpO2: 94%   Weight: 137 lb (62.1 kg)   Height: 4' 8\" (1.422 m)     Body mass index is 30.71 kg/(m^2).     Exam:   Appearance: alert, well appearing,  oriented to person, place, and time, acyanotic, in no respiratory distress and well hydrated. HEENT:  NC/AT, pink conj, anicteric sclerae  Neck:  No cervical lymphadenopathy, no JVD, no thyromegaly, no carotid bruit  Heart:  RRR without M/R/G  Lungs:  CTAB, no rhonchi, rales, or wheezes with good air exchange   Abdomen:  Non-tender, pos bowel sounds, no hepatosplenomegaly  Ext:  No C/C/E    Skin: no rash  Neuro: no lateralizing signs, CNs II-XII intact    Diabetic foot exam:     Left: Reflexes 2+     Vibratory sensation normal    Proprioception normal   Sharp/dull discrimination normal    Filament test normal sensation with micro filament   Pulse DP: 2+ (normal)   Pulse PT: 2+ (normal)   Deformities: None  Right: Reflexes 2+   Vibratory sensation normal   Proprioception normal   Sharp/dull discrimination normal   Filament test normal sensation with micro filament   Pulse DP: 2+ (normal)   Pulse PT: 2+ (normal)   Deformities: None    Last Point of Care HGB A1C  Hemoglobin A1c (POC)   Date Value Ref Range Status   05/10/2018 5.5 % Final      Assessment/ Plan:   Diagnoses and all orders for this visit:    1. Medicare annual wellness visit, subsequent-see note below    2. Type 2 diabetes mellitus with hyperglycemia, with long-term current use of insulin (HCC)-not on meds, continue to watch diet  -     AMB POC HEMOGLOBIN A1C  -      DIABETES FOOT EXAM    3. Hyperlipidemia, unspecified hyperlipidemia type-on Lipitor  -     LIPID PANEL; Future    4. Idiopathic gout, unspecified chronicity, unspecified site-no recent sxs  -     URIC ACID; Future    5. Essential hypertension-advised on how to take meds when she is not ahving dialysis  -     METABOLIC PANEL, COMPREHENSIVE; Future  -     CBC WITH AUTOMATED DIFF; Future    6. ESRD (end stage renal disease) on dialysis ([facility]    7. Hypothyroidism due to acquired atrophy of thyroid-on Synthroid  -     TSH 3RD GENERATION; Future      Follow-up Disposition:  Return in about 3 months (around 8/10/2018) for follow up.         I have discussed the diagnosis with the patient and the intended plan as seen in the above orders. The patient has received an After-Visit Summary and questions were answered concerning future plans. Medication Side Effects and Warnings were discussed with patient: yes    Patient verbalized understanding of above instructions. Kajal Rutherford MD  Internal Medicine  [facility]      This is the Subsequent Medicare Annual Wellness Exam, performed 12 months or more after the Initial AWV or the last Subsequent AWV    I have reviewed the patient's medical history in detail and updated the computerized patient record.      Discussed Shingrix      History     Past Medical History:   Diagnosis Date    Anemia in CKD (chronic kidney disease)     Aortic stenosis     Asthma     Calculus of kidney     Cancer ([address]     right kidney    Cardiomyopathy ([address]     EF 20-25% (10/16), 55% (06/16)    Chronic kidney disease     dialysis MWF    Chronic kidney disease, stage V ([address]     Diabetes ([address]     Dialysis patient ([address]     Dialysis patient ([address]     M-W-F; LEFT AVF; PERM CATH RIGHT CHEST    Gastrointestinal hemorrhage associated with peptic ulcer     Gastrointestinal hemorrhage associated with peptic ulcer 10/5/2016    Gout     Hypercholesterolemia     Hypertension     Primary hyperparathyroidism ([address] 2010    s/p parathyroid adenoma excision    Secondary hyperparathyroidism (of renal origin)     Sleep apnea     C PAP    Status post nephrectomy     right    Thyroid disease       Past Surgical History:   Procedure Laterality Date    COLONOSCOPY  08/28/2015    Repeat 5 years    HX GYN      C-sections x 3    HX HEENT      total thyroidectomy    HX NEPHRECTOMY      right    HX THYROIDECTOMY      HX VASCULAR ACCESS      dialysis catheter R upper chest    VASCULAR SURGERY PROCEDURE UNLIST      fistula LUE     Current Outpatient Prescriptions   Medication Sig Dispense Refill    losartan (COZAAR) 100 mg tablet TAKE ONE TABLET BY MOUTH ONE TIME DAILY  30 Tab 5    amLODIPine (NORVASC) 5 mg tablet TAKE ONE TABLET BY MOUTH ONE TIME DAILY  30 Tab 5    metoprolol succinate (TOPROL-XL) 100 mg tablet Take 1 Tab by mouth daily. 30 Tab 6    levothyroxine (SYNTHROID) 50 mcg tablet Take 1 Tab by mouth Daily (before breakfast). 90 Tab 1    RENVELA 800 mg tab tab Take 800 mg by mouth three (3) times daily. Patient reports medication is taken five times daily with meals and snacks  11    FABBY-DARLIN 0.8 mg tab tablet Take 0.8 mg by mouth daily. 11    OXYGEN-AIR DELIVERY SYSTEMS Take 3 L by inhalation continuous.  atorvastatin (LIPITOR) 40 mg tablet Take 20 mg by mouth daily. 5    budesonide-formoterol (SYMBICORT) 160-4.5 mcg/actuation HFA inhaler Take 2 Puffs by inhalation two (2) times a day.          Allergies   Allergen Reactions    Ace Inhibitors Cough    Aspirin Other (comments)     Hallucinations    Hydralazine Other (comments)     Hallucinations, dizziness and numbness in legs      Family History   Problem Relation Age of Onset    Hypertension Mother     Diabetes Mother     No Known Problems Father     No Known Problems Sister     No Known Problems Brother     No Known Problems Other     No Known Problems Brother     No Known Problems Brother     No Known Problems Daughter     No Known Problems Son     No Known Problems Son      Social History   Substance Use Topics    Smoking status: Never Smoker    Smokeless tobacco: Never Used    Alcohol use No     Patient Active Problem List   Diagnosis Code    Gout M10.9    Hyperlipidemia E78.5    Tubular adenoma of colon D12.6    Anemia in chronic kidney disease N18.9, D63.1    MELANIE on CPAP G47.33, Z99.89    CHF (congestive heart failure) ([facility]) I50.9    Type 2 diabetes mellitus with hyperglycemia, with long-term current use of insulin ([facility]) E11.65, Z79.4    Advance directive discussed with patient Z70.80    Essential hypertension I10    Hypothyroidism due to acquired atrophy of thyroid E03.4    ESRD (end stage renal disease) on dialysis ([facility] N18.6, Z99.2    Type 2 diabetes mellitus with nephropathy (HCC) E11.21    Chronic obstructive pulmonary disease (HCC) J44.9       Depression Risk Factor Screening:     PHQ over the last two weeks 1/9/2018   Little interest or pleasure in doing things Not at all   Feeling down, depressed or hopeless Not at all   Total Score PHQ 2 0     Alcohol Risk Factor Screening: You do not drink alcohol or very rarely. Functional Ability and Level of Safety:   Hearing Loss  Hearing is good. Activities of Daily Living  The home contains: handrails and grab bars  Patient does total self care    Fall Risk  Fall Risk Assessment, last 12 mths 5/10/2018   Able to walk? Yes   Fall in past 12 months? No       Abuse Screen  Patient is not abused    Cognitive Screening   Evaluation of Cognitive Function:  Has your family/caregiver stated any concerns about your memory: no  Normal    Patient Care Team   Patient Care Team:  Halie Silvestre MD as PCP - General (Internal Medicine)  Brigitte Sanchez MD (Nephrology)  Khanh Hutchins MD (Cardiology)  Jennifer Singleton MD (Pulmonary Disease)  Kaitlynn Ro NP (Nurse Practitioner)  Lyubov Fitzgerald RN as Ambulatory Care Navigator  Lev Kurtz MD (Endocrinology)  Paz Guerra MD (Ophthalmology)    Assessment/Plan   Education and counseling provided:  Are appropriate based on today's review and evaluation  End-of-Life planning (with patient's consent)-see ACP note  Pneumococcal Vaccine-done  Influenza Vaccine-in Sept  Screening Mammography-due Oct 2018  Colorectal cancer screening tests-up to date  Cardiovascular screening blood test-fasting lipids today  Bone mass measurement (DEXA)-due, 2015-osteopenia  Screening for glaucoma-went last week to Dr. Farzana Barrera    Diagnoses and all orders for this visit:    1.  Medicare annual wellness visit, subsequent-Refer to above for plan and to patient instructions for recommendations on HM    2.  Advance directive discussed with patient        Health Maintenance Due   Topic Date Due    MEDICARE YEARLY EXAM  03/23/2018    HEMOGLOBIN A1C Q6M  04/05/2018    FOOT EXAM Q1  05/02/2018    LIPID PANEL Q1  05/02/2018     RTC yearly for wellness visit

## 2018-05-10 NOTE — PROGRESS NOTES
Chief Complaint   Patient presents with    Annual Wellness Visit     Check A1C, patient is fasting for labs if neeeded     1. Have you been to the ER, urgent care clinic since your last visit? Hospitalized since your last visit? No    2. Have you seen or consulted any other health care providers outside of the [address] [place] [address] since your last visit? Include any pap smears or colon screening.  No

## 2018-05-10 NOTE — ACP (ADVANCE CARE PLANNING)
Advance Care Planning (ACP) Provider Conversation Snapshot    Date of ACP Conversation: 05/10/18  Persons included in Conversation:  patient and family  Length of ACP Conversation in minutes:  <16 minutes (Non-Billable)    Authorized Decision Maker (if patient is incapable of making informed decisions):    This person is:   Sera Daniel          For Patients with Decision Making Capacity:   Values/Goals: Exploration of values, goals, and preferences if recovery is not expected, even with continued medical treatment in the event of:  Imminent death  Severe, permanent brain injury    Conversation Outcomes / Follow-Up Plan:   Recommended completion of Advance Directive form after review of ACP materials and conversation with prospective healthcare agent

## 2018-05-11 LAB
ALBUMIN SERPL-MCNC: 4.2 G/DL (ref 3.5–4.8)
ALBUMIN/GLOB SERPL: 1.3 {RATIO} (ref 1.2–2.2)
ALP SERPL-CCNC: 261 IU/L (ref 39–117)
ALT SERPL-CCNC: 8 IU/L (ref 0–32)
AST SERPL-CCNC: 15 IU/L (ref 0–40)
BASOPHILS # BLD AUTO: 0 X10E3/UL (ref 0–0.2)
BASOPHILS NFR BLD AUTO: 0 %
BILIRUB SERPL-MCNC: 0.3 MG/DL (ref 0–1.2)
BUN SERPL-MCNC: 15 MG/DL (ref 8–27)
BUN/CREAT SERPL: 3 (ref 12–28)
CALCIUM SERPL-MCNC: 10.8 MG/DL (ref 8.7–10.3)
CHLORIDE SERPL-SCNC: 100 MMOL/L (ref 96–106)
CHOLEST SERPL-MCNC: 248 MG/DL (ref 100–199)
CO2 SERPL-SCNC: 20 MMOL/L (ref 18–29)
CREAT SERPL-MCNC: 4.4 MG/DL (ref 0.57–1)
EOSINOPHIL # BLD AUTO: 0.1 X10E3/UL (ref 0–0.4)
EOSINOPHIL NFR BLD AUTO: 3 %
ERYTHROCYTE [DISTWIDTH] IN BLOOD BY AUTOMATED COUNT: 16.4 % (ref 12.3–15.4)
GFR SERPLBLD CREATININE-BSD FMLA CKD-EPI: 11 ML/MIN/1.73
GFR SERPLBLD CREATININE-BSD FMLA CKD-EPI: 9 ML/MIN/1.73
GLOBULIN SER CALC-MCNC: 3.2 G/DL (ref 1.5–4.5)
GLUCOSE SERPL-MCNC: 87 MG/DL (ref 65–99)
HCT VFR BLD AUTO: 32.2 % (ref 34–46.6)
HDLC SERPL-MCNC: 70 MG/DL
HGB BLD-MCNC: 10.3 G/DL (ref 11.1–15.9)
IMM GRANULOCYTES # BLD: 0 X10E3/UL (ref 0–0.1)
IMM GRANULOCYTES NFR BLD: 1 %
INTERPRETATION, 910389: NORMAL
INTERPRETATION: NORMAL
LDLC SERPL CALC-MCNC: 152 MG/DL (ref 0–99)
LYMPHOCYTES # BLD AUTO: 1.3 X10E3/UL (ref 0.7–3.1)
LYMPHOCYTES NFR BLD AUTO: 31 %
MCH RBC QN AUTO: 29.8 PG (ref 26.6–33)
MCHC RBC AUTO-ENTMCNC: 32 G/DL (ref 31.5–35.7)
MCV RBC AUTO: 93 FL (ref 79–97)
MONOCYTES # BLD AUTO: 0.2 X10E3/UL (ref 0.1–0.9)
MONOCYTES NFR BLD AUTO: 5 %
NEUTROPHILS # BLD AUTO: 2.6 X10E3/UL (ref 1.4–7)
NEUTROPHILS NFR BLD AUTO: 60 %
PDF IMAGE, 910387: NORMAL
PLATELET # BLD AUTO: 126 X10E3/UL (ref 150–379)
POTASSIUM SERPL-SCNC: 4.8 MMOL/L (ref 3.5–5.2)
PROT SERPL-MCNC: 7.4 G/DL (ref 6–8.5)
RBC # BLD AUTO: 3.46 X10E6/UL (ref 3.77–5.28)
SODIUM SERPL-SCNC: 143 MMOL/L (ref 134–144)
TRIGL SERPL-MCNC: 129 MG/DL (ref 0–149)
TSH SERPL DL<=0.005 MIU/L-ACNC: 4.01 UIU/ML (ref 0.45–4.5)
URATE SERPL-MCNC: 3.5 MG/DL (ref 2.5–7.1)
VLDLC SERPL CALC-MCNC: 26 MG/DL (ref 5–40)
WBC # BLD AUTO: 4.3 X10E3/UL (ref 3.4–10.8)

## 2018-05-14 NOTE — PROGRESS NOTES
pls let patient know labs stable except for cholesterol levels being up; she needs to get back to taking her cholesterol med 1 pill daily instead of half a pill

## 2018-10-23 ENCOUNTER — HOSPITAL ENCOUNTER (OUTPATIENT)
Dept: MAMMOGRAPHY | Age: 73
Discharge: HOME | End: 2018-10-23
Attending: INTERNAL MEDICINE
Payer: MEDICARE

## 2018-10-23 DIAGNOSIS — Z12.31 VISIT FOR SCREENING MAMMOGRAM: ICD-10-CM

## 2018-10-23 PROCEDURE — 77063 BREAST TOMOSYNTHESIS BI: CPT

## 2019-04-23 ENCOUNTER — OFFICE VISIT (OUTPATIENT)
Dept: FAMILY MEDICINE CLINIC | Age: 74
End: 2019-04-23

## 2019-04-23 VITALS
HEIGHT: 56 IN | TEMPERATURE: 97.3 F | RESPIRATION RATE: 10 BRPM | HEART RATE: 74 BPM | BODY MASS INDEX: 29.92 KG/M2 | SYSTOLIC BLOOD PRESSURE: 130 MMHG | DIASTOLIC BLOOD PRESSURE: 64 MMHG | WEIGHT: 133 LBS

## 2019-04-23 DIAGNOSIS — E11.65 TYPE 2 DIABETES MELLITUS WITH HYPERGLYCEMIA, WITH LONG-TERM CURRENT USE OF INSULIN (HCC): Primary | ICD-10-CM

## 2019-04-23 DIAGNOSIS — M85.80 OSTEOPENIA, UNSPECIFIED LOCATION: ICD-10-CM

## 2019-04-23 DIAGNOSIS — I73.9 PVD (PERIPHERAL VASCULAR DISEASE) (HCC): ICD-10-CM

## 2019-04-23 DIAGNOSIS — E66.3 OVERWEIGHT (BMI 25.0-29.9): ICD-10-CM

## 2019-04-23 DIAGNOSIS — E78.5 HYPERLIPIDEMIA, UNSPECIFIED HYPERLIPIDEMIA TYPE: ICD-10-CM

## 2019-04-23 DIAGNOSIS — R93.0 ABNORMAL CT OF THE HEAD: ICD-10-CM

## 2019-04-23 DIAGNOSIS — J44.9 CHRONIC OBSTRUCTIVE PULMONARY DISEASE, UNSPECIFIED COPD TYPE (HCC): ICD-10-CM

## 2019-04-23 DIAGNOSIS — Z99.2 ESRD (END STAGE RENAL DISEASE) ON DIALYSIS (HCC): ICD-10-CM

## 2019-04-23 DIAGNOSIS — I50.9 CONGESTIVE HEART FAILURE, UNSPECIFIED HF CHRONICITY, UNSPECIFIED HEART FAILURE TYPE (HCC): ICD-10-CM

## 2019-04-23 DIAGNOSIS — E03.4 HYPOTHYROIDISM DUE TO ACQUIRED ATROPHY OF THYROID: ICD-10-CM

## 2019-04-23 DIAGNOSIS — Z79.4 TYPE 2 DIABETES MELLITUS WITH HYPERGLYCEMIA, WITH LONG-TERM CURRENT USE OF INSULIN (HCC): Primary | ICD-10-CM

## 2019-04-23 DIAGNOSIS — M25.562 ACUTE PAIN OF LEFT KNEE: ICD-10-CM

## 2019-04-23 DIAGNOSIS — I10 ESSENTIAL HYPERTENSION: ICD-10-CM

## 2019-04-23 DIAGNOSIS — N18.6 ESRD (END STAGE RENAL DISEASE) ON DIALYSIS (HCC): ICD-10-CM

## 2019-04-23 LAB — HBA1C MFR BLD HPLC: 5.5 %

## 2019-04-23 NOTE — PROGRESS NOTES
Chief Complaint Patient presents with  Fall 3-15-19 [facility]  Back Pain  
  since fall  Hypotension  
  at Dialysis for more than 3 months Pt is a 74y.o. year old female who presents for follow up of her chronic medical problems Wt Readings from Last 3 Encounters:  
04/23/19 133 lb (60.3 kg) 05/10/18 137 lb (62.1 kg) 01/09/18 132 lb (59.9 kg) BP Readings from Last 3 Encounters:  
04/23/19 130/64  
05/10/18 159/65  
01/09/18 176/63  
decreased BP during dialysis-MWF Taken off Amlodipine by Renal 
Usually takes Losartan at night as well as BID Metoprolol Last Point of Care HGB A1C Hemoglobin A1c (POC) Date Value Ref Range Status 04/23/2019 5.5 % Final  
 off DM meds Denies polyuria, polydipsia and polyphagia Fasting?yes On Lipitor COPD-on Symbicort; sees Pulmo; on O2 at  Night and during dialysis PVD-no sxs CHF-June appt with Cardio Lab Results Component Value Date/Time TSH 4.010 05/10/2018 10:28 AM  
Dr Bettye Prince took her off of Synthroid per  Meds reviewed: off Norvasc and Synthroid Will do labs at dialysis New problem: fall on 2/2019 Walks with a cane Still with left knee pain ED CT HEAD NO CONTRAST (02/24/2019 7:38 PM EST) ED CT HEAD NO CONTRAST (02/24/2019 7:38 PM EST) Impressions Performed At  
   
No clearly acute findings.   
   
Prominent cerebral level ventricles slightly disproportionate to degree of cerebral atrophy, may be due to combination of cortical and central atrophy. However correlate clinically for possibility of normal pressure hydrocephalus.   
   
Sclerotic calvarium, nonspecific, possibly due to renal osteodystrophy in this setting of chronic renal disease. Clinical correlation advised and consider further investigation if warranted.   
     
 
KNEE 3 VIEWS LT (02/24/2019 7:08 PM EST) KNEE 3 VIEWS LT (02/24/2019 7:08 PM EST) Impressions Performed At  
   
 Fibular head fracture not completely excluded. Clinical correlation for point tenderness?     
 
 
  
ANKLE COMPLETE LT (02/24/2019 7:08 PM EST) ANKLE COMPLETE LT (02/24/2019 7:08 PM EST) Impressions Performed At  
   
Limited study due to osteoporosis. Soft tissue swelling present. Questionable calcaneal fracture.     
 
 
Not on med for osteoporosis Last Dexa was 2015-osteopenia Health Maintenance Due Topic Date Due  Shingrix Vaccine Age 50> (1 of 2)-advised to get this at her [facility] 12/06/1993  
 EYE EXAM RETINAL OR DILATED -[facility]; seen in Feb 09/28/2017  
 HEMOGLOBIN A1C Q6M -today 11/10/2018  MICROALBUMIN Q1-on HD; very little urine production 01/09/2019  LIPID PANEL Q1 -with next labs 05/10/2019  MEDICARE YEARLY EXAM -next visit 05/11/2019  
 FOOT EXAM Q1 -next visit 05/13/2019  GLAUCOMA SCREENING Q2Y -done 06/22/2019 Gets diarrhea about 2x a week-given Lomotil at dialysis ROS: 
 
Pt denies: Wt loss, Fever/Chills, HA, Visual changes, Fatigue, Chest pain, SOB, LEBLANC, Abd pain, N/V/D/C, Blood in stool or urine, Edema. Pertinent positive as above in HPI. All others were negative Patient Active Problem List  
Diagnosis Code  Gout M10.9  Hyperlipidemia E78.5  Tubular adenoma of colon D12.6  Anemia in chronic kidney disease N18.9, D63.1  MELANIE on CPAP G47.33, Z99.89  
 CHF (congestive heart failure) ([facility]) I50.9  Type 2 diabetes mellitus with hyperglycemia, with long-term current use of insulin ([facility]) E11.65, Z79.4  Advance directive discussed with patient Z70.80  
 Essential hypertension I10  
 Hypothyroidism due to acquired atrophy of thyroid E03.4  ESRD (end stage renal disease) on dialysis ([facility]) N18.6, Z99.2  Type 2 diabetes mellitus with nephropathy ([facility]) E11.21  
 Chronic obstructive pulmonary disease ([facility]) J44.9 Past Medical History:  
Diagnosis Date  Anemia in CKD (chronic kidney disease)  Aortic stenosis  Asthma  Calculus of kidney  Cancer ([facility] right kidney  Cardiomyopathy ([facility] EF 20-25% (10/16), 55% (06/16)  Chronic kidney disease   
 dialysis MWF  Chronic kidney disease, stage V ([facility]  Diabetes ([facility]  Dialysis patient [facility])  Dialysis patient [facility]) M-W-F; LEFT AVF; PERM CATH RIGHT CHEST  Gastrointestinal hemorrhage associated with peptic ulcer  Gastrointestinal hemorrhage associated with peptic ulcer 10/5/2016  Gout  Hypercholesterolemia  Hypertension  Primary hyperparathyroidism ([facility] 2010  
 s/p parathyroid adenoma excision  Secondary hyperparathyroidism (of renal origin)  Sleep apnea C PAP  Status post nephrectomy   
 right  Thyroid disease Current Outpatient Medications Medication Sig Dispense Refill  losartan (COZAAR) 100 mg tablet TAKE 1 TABLET BY MOUTH ONCE DAILY 90 Tab 1  
 atorvastatin (LIPITOR) 40 mg tablet Take 0.5 Tabs by mouth daily. 90 Tab 3  
 metoprolol succinate (TOPROL-XL) 100 mg tablet Take 1 Tab by mouth daily. (Patient taking differently: Take 100 mg by mouth two (2) times a day.) 30 Tab 6  
 RENVELA 800 mg tab tab Take 800 mg by mouth three (3) times daily. Patient reports medication is taken five times daily with meals and snacks  11  
 FABBY-DARLIN 0.8 mg tab tablet Take 0.8 mg by mouth daily. 11  
 OXYGEN-AIR DELIVERY SYSTEMS Take 3 L by inhalation continuous.  budesonide-formoterol (SYMBICORT) 160-4.5 mcg/actuation HFA inhaler Take 2 Puffs by inhalation two (2) times a day. Social History Tobacco Use Smoking Status Never Smoker Smokeless Tobacco Never Used Allergies Allergen Reactions  Ace Inhibitors Cough  Aspirin Other (comments) Hallucinations  Hydralazine Other (comments) Hallucinations, dizziness and numbness in legs Patient Labs were reviewed: yes Patient Past Records were reviewed:  yes Objective:  
 
Vitals: 04/23/19 3354 BP: 130/64 Pulse: 74 Resp: 10 Temp: 97.3 °F (36.3 °C) TempSrc: Oral  
Weight: 133 lb (60.3 kg) Height: 4' 8\" (1.422 m) Body mass index is 29.82 kg/m². Exam:  
Appearance: alert, well appearing,  oriented to person, place, and time, acyanotic, in no respiratory distress and well hydrated. HEENT:  NC/AT, pink conj, anicteric sclerae Neck:  No cervical lymphadenopathy, no JVD, no thyromegaly, no carotid bruit Heart:  RRR without M/R/G Lungs:  CTAB, no rhonchi, rales, or wheezes with good air exchange Abdomen:  Non-tender, pos bowel sounds, no hepatosplenomegaly Ext:  No C/C/E Skin: no rash Neuro: no lateralizing signs, CNs II-XII intact Last Point of Care HGB A1C Hemoglobin A1c (POC) Date Value Ref Range Status 04/23/2019 5.5 % Final  
  
Assessment/ Plan:  
Diagnoses and all orders for this visit: 
 
1. Type 2 diabetes mellitus with hyperglycemia, with long-term current use of insulin ([facility])-controlled, not on meds -     AMB POC HEMOGLOBIN A1C 
 
2. Essential hypertension-advised to hold off on night time dose of Metoprolol the night before hemodialysis to avoid hypotensive episodes 3. Hypothyroidism due to acquired atrophy of thyroid-off Synthroid 
-     TSH 3RD GENERATION; Future 4. PVD (peripheral vascular disease) ([facility])-Vasc following 5. Congestive heart failure, unspecified HF chronicity, unspecified heart failure type ([facility])-see Dr Jessica Berkowitz, currently asymptomatic 
-     REFERRAL TO CARDIOLOGY 6. ESRD (end stage renal disease) on dialysis ([facility])-Renal following 7. Chronic obstructive pulmonary disease, unspecified COPD type ([facility])-sees Pulmo 8. Overweight (BMI 25.0-29. 9)-discussed limiting abi to [id number]/day as she is unable to exercise much 9. Hyperlipidemia, unspecified hyperlipidemia type-on Lipitor -     LIPID PANEL; Future 10. Abnormal CT of the head-suggestion of NPH on CT head after recent fall -     REFERRAL TO NEUROLOGY 
-     REFERRAL TO OPHTHALMOLOGY 11. Acute pain of left knee-?fibular fracture on Xray 
-     REFERRAL TO ORTHOPEDICS Follow-up and Dispositions · Return in about 2 months (around 6/23/2019) for annual wellness, follow up. I have discussed the diagnosis with the patient and the intended plan as seen in the above orders. The patient has received an After-Visit Summary and questions were answered concerning future plans. Medication Side Effects and Warnings were discussed with patient: yes Patient verbalized understanding of above instructions. Mary Cunningham MD 
Internal Medicine [address] [facility]

## 2019-04-23 NOTE — PATIENT INSTRUCTIONS
High Blood Pressure: Care Instructions Overview It's normal for blood pressure to go up and down throughout the day. But if it stays up, you have high blood pressure. Another name for high blood pressure is hypertension. Despite what a lot of people think, high blood pressure usually doesn't cause headaches or make you feel dizzy or lightheaded. It usually has no symptoms. But it does increase your risk of stroke, heart attack, and other problems. You and your doctor will talk about your risks of these problems based on your blood pressure. Your doctor will give you a goal for your blood pressure. Your goal will be based on your health and your age. Lifestyle changes, such as eating healthy and being active, are always important to help lower blood pressure. You might also take medicine to reach your blood pressure goal. 
Follow-up care is a key part of your treatment and safety. Be sure to make and go to all appointments, and call your doctor if you are having problems. It's also a good idea to know your test results and keep a list of the medicines you take. How can you care for yourself at home? Medical treatment · If you stop taking your medicine, your blood pressure will go back up. You may take one or more types of medicine to lower your blood pressure. Be safe with medicines. Take your medicine exactly as prescribed. Call your doctor if you think you are having a problem with your medicine. · Talk to your doctor before you start taking aspirin every day. Aspirin can help certain people lower their risk of a heart attack or stroke. But taking aspirin isn't right for everyone, because it can cause serious bleeding. · See your doctor regularly. You may need to see the doctor more often at first or until your blood pressure comes down. · If you are taking blood pressure medicine, talk to your doctor before you take decongestants or anti-inflammatory medicine, such as ibuprofen. Some of these medicines can raise blood pressure. · Learn how to check your blood pressure at home. Lifestyle changes · Stay at a healthy weight. This is especially important if you put on weight around the waist. Losing even 10 pounds can help you lower your blood pressure. · If your doctor recommends it, get more exercise. Walking is a good choice. Bit by bit, increase the amount you walk every day. Try for at least 30 minutes on most days of the week. You also may want to swim, bike, or do other activities. · Avoid or limit alcohol. Talk to your doctor about whether you can drink any alcohol. · Try to limit how much sodium you eat to less than 2,300 milligrams (mg) a day. Your doctor may ask you to try to eat less than 1,500 mg a day. · Eat plenty of fruits (such as bananas and oranges), vegetables, legumes, whole grains, and low-fat dairy products. · Lower the amount of saturated fat in your diet. Saturated fat is found in animal products such as milk, cheese, and meat. Limiting these foods may help you lose weight and also lower your risk for heart disease. · Do not smoke. Smoking increases your risk for heart attack and stroke. If you need help quitting, talk to your doctor about stop-smoking programs and medicines. These can increase your chances of quitting for good. When should you call for help? Call 911 anytime you think you may need emergency care. This may mean having symptoms that suggest that your blood pressure is causing a serious heart or blood vessel problem. Your blood pressure may be over 180/120. 
 For example, call 911 if: 
  · You have symptoms of a heart attack. These may include: 
? Chest pain or pressure, or a strange feeling in the chest. 
? Sweating. ? Shortness of breath. ? Nausea or vomiting. ? Pain, pressure, or a strange feeling in the back, neck, jaw, or upper belly or in one or both shoulders or arms. ? Lightheadedness or sudden weakness. ? A fast or irregular heartbeat.  
  · You have symptoms of a stroke. These may include: 
? Sudden numbness, tingling, weakness, or loss of movement in your face, arm, or leg, especially on only one side of your body. ? Sudden vision changes. ? Sudden trouble speaking. ? Sudden confusion or trouble understanding simple statements. ? Sudden problems with walking or balance. ? A sudden, severe headache that is different from past headaches.  
  · You have severe back or belly pain.  
 Do not wait until your blood pressure comes down on its own. Get help right away. 
 Call your doctor now or seek immediate care if: 
  · Your blood pressure is much higher than normal (such as 180/120 or higher), but you don't have symptoms.  
  · You think high blood pressure is causing symptoms, such as: 
? Severe headache. 
? Blurry vision.  
 Watch closely for changes in your health, and be sure to contact your doctor if: 
  · Your blood pressure measures higher than your doctor recommends at least 2 times. That means the top number is higher or the bottom number is higher, or both.  
  · You think you may be having side effects from your blood pressure medicine. Where can you learn more? Go to http://mihai-zach.info/. Enter A697 in the search box to learn more about \"High Blood Pressure: Care Instructions. \" Current as of: July 22, 2018 Content Version: 11.9 © 1185-9855 [employer]. Care instructions adapted under license by [employer] (which disclaims liability or warranty for this information). If you have questions about a medical condition or this instruction, always ask your healthcare professional. [place] [address] any warranty or liability for your use of this information.

## 2019-05-08 ENCOUNTER — LAB ONLY (OUTPATIENT)
Dept: FAMILY MEDICINE CLINIC | Age: 74
End: 2019-05-08

## 2019-05-08 DIAGNOSIS — E78.5 HYPERLIPIDEMIA, UNSPECIFIED HYPERLIPIDEMIA TYPE: Chronic | ICD-10-CM

## 2019-05-08 DIAGNOSIS — E03.4 HYPOTHYROIDISM DUE TO ACQUIRED ATROPHY OF THYROID: Chronic | ICD-10-CM

## 2019-05-08 DIAGNOSIS — I10 ESSENTIAL HYPERTENSION: Chronic | ICD-10-CM

## 2019-05-08 DIAGNOSIS — M10.00 IDIOPATHIC GOUT, UNSPECIFIED CHRONICITY, UNSPECIFIED SITE: Chronic | ICD-10-CM

## 2019-05-09 LAB
ALBUMIN SERPL-MCNC: 4.1 G/DL (ref 3.5–4.8)
ALBUMIN/GLOB SERPL: 1.5 {RATIO} (ref 1.2–2.2)
ALP SERPL-CCNC: 447 IU/L (ref 39–117)
ALT SERPL-CCNC: 7 IU/L (ref 0–32)
AST SERPL-CCNC: 18 IU/L (ref 0–40)
BASOPHILS # BLD AUTO: 0 X10E3/UL (ref 0–0.2)
BASOPHILS NFR BLD AUTO: 0 %
BILIRUB SERPL-MCNC: 0.2 MG/DL (ref 0–1.2)
BUN SERPL-MCNC: 8 MG/DL (ref 8–27)
BUN/CREAT SERPL: 3 (ref 12–28)
CALCIUM SERPL-MCNC: 9.1 MG/DL (ref 8.7–10.3)
CHLORIDE SERPL-SCNC: 95 MMOL/L (ref 96–106)
CHOLEST SERPL-MCNC: 143 MG/DL (ref 100–199)
CO2 SERPL-SCNC: 24 MMOL/L (ref 20–29)
CREAT SERPL-MCNC: 2.56 MG/DL (ref 0.57–1)
EOSINOPHIL # BLD AUTO: 0.1 X10E3/UL (ref 0–0.4)
EOSINOPHIL NFR BLD AUTO: 2 %
ERYTHROCYTE [DISTWIDTH] IN BLOOD BY AUTOMATED COUNT: 16.5 % (ref 12.3–15.4)
GLOBULIN SER CALC-MCNC: 2.8 G/DL (ref 1.5–4.5)
GLUCOSE SERPL-MCNC: 99 MG/DL (ref 65–99)
HCT VFR BLD AUTO: 34.6 % (ref 34–46.6)
HDLC SERPL-MCNC: 59 MG/DL
HGB BLD-MCNC: 11.1 G/DL (ref 11.1–15.9)
IMM GRANULOCYTES # BLD AUTO: 0 X10E3/UL (ref 0–0.1)
IMM GRANULOCYTES NFR BLD AUTO: 0 %
INTERPRETATION, 910389: NORMAL
INTERPRETATION: NORMAL
LDLC SERPL CALC-MCNC: 56 MG/DL (ref 0–99)
LYMPHOCYTES # BLD AUTO: 1.3 X10E3/UL (ref 0.7–3.1)
LYMPHOCYTES NFR BLD AUTO: 27 %
MCH RBC QN AUTO: 28.2 PG (ref 26.6–33)
MCHC RBC AUTO-ENTMCNC: 32.1 G/DL (ref 31.5–35.7)
MCV RBC AUTO: 88 FL (ref 79–97)
MONOCYTES # BLD AUTO: 0.3 X10E3/UL (ref 0.1–0.9)
MONOCYTES NFR BLD AUTO: 6 %
NEUTROPHILS # BLD AUTO: 3 X10E3/UL (ref 1.4–7)
NEUTROPHILS NFR BLD AUTO: 65 %
PDF IMAGE, 910387: NORMAL
PLATELET # BLD AUTO: 112 X10E3/UL (ref 150–379)
POTASSIUM SERPL-SCNC: 4.4 MMOL/L (ref 3.5–5.2)
PROT SERPL-MCNC: 6.9 G/DL (ref 6–8.5)
RBC # BLD AUTO: 3.93 X10E6/UL (ref 3.77–5.28)
SODIUM SERPL-SCNC: 136 MMOL/L (ref 134–144)
TRIGL SERPL-MCNC: 142 MG/DL (ref 0–149)
TSH SERPL DL<=0.005 MIU/L-ACNC: 5.85 UIU/ML (ref 0.45–4.5)
URATE SERPL-MCNC: 1.7 MG/DL (ref 2.5–7.1)
VLDLC SERPL CALC-MCNC: 28 MG/DL (ref 5–40)
WBC # BLD AUTO: 4.7 X10E3/UL (ref 3.4–10.8)

## 2019-05-09 NOTE — PROGRESS NOTES
pls let patient know-labs look good except for slightly elevated thyroid test; will observe this for now and recheck next visit prior to restarting thyroid medication

## 2019-06-03 ENCOUNTER — OFFICE VISIT (OUTPATIENT)
Dept: FAMILY MEDICINE CLINIC | Age: 74
End: 2019-06-03

## 2019-06-03 VITALS
RESPIRATION RATE: 12 BRPM | SYSTOLIC BLOOD PRESSURE: 128 MMHG | OXYGEN SATURATION: 90 % | TEMPERATURE: 98.2 F | HEIGHT: 56 IN | BODY MASS INDEX: 29.92 KG/M2 | DIASTOLIC BLOOD PRESSURE: 47 MMHG | HEART RATE: 87 BPM | WEIGHT: 133 LBS

## 2019-06-03 DIAGNOSIS — I50.9 CONGESTIVE HEART FAILURE, UNSPECIFIED HF CHRONICITY, UNSPECIFIED HEART FAILURE TYPE (HCC): ICD-10-CM

## 2019-06-03 DIAGNOSIS — I35.0 NONRHEUMATIC AORTIC VALVE STENOSIS: ICD-10-CM

## 2019-06-03 DIAGNOSIS — I10 ESSENTIAL HYPERTENSION: ICD-10-CM

## 2019-06-03 DIAGNOSIS — E11.65 TYPE 2 DIABETES MELLITUS WITH HYPERGLYCEMIA, WITHOUT LONG-TERM CURRENT USE OF INSULIN (HCC): ICD-10-CM

## 2019-06-03 DIAGNOSIS — Z00.00 MEDICARE ANNUAL WELLNESS VISIT, SUBSEQUENT: Primary | ICD-10-CM

## 2019-06-03 DIAGNOSIS — M94.9 DISORDER OF BONE AND CARTILAGE: ICD-10-CM

## 2019-06-03 DIAGNOSIS — M89.9 DISORDER OF BONE AND CARTILAGE: ICD-10-CM

## 2019-06-03 DIAGNOSIS — R93.0 ABNORMAL CT OF THE HEAD: ICD-10-CM

## 2019-06-03 DIAGNOSIS — M25.562 ACUTE PAIN OF LEFT KNEE: ICD-10-CM

## 2019-06-03 DIAGNOSIS — E03.4 HYPOTHYROIDISM DUE TO ACQUIRED ATROPHY OF THYROID: ICD-10-CM

## 2019-06-03 DIAGNOSIS — Z71.89 ADVANCE DIRECTIVE DISCUSSED WITH PATIENT: ICD-10-CM

## 2019-06-03 NOTE — PROGRESS NOTES
This is the Subsequent Medicare Annual Wellness Exam, performed 12 months or more after the Initial AWV or the last Subsequent AWV    I have reviewed the patient's medical history in detail and updated the computerized patient record. History     Past Medical History:   Diagnosis Date    Anemia in CKD (chronic kidney disease)     Aortic stenosis     Asthma     Calculus of kidney     Cancer (HCC)     right kidney    Cardiomyopathy ([facility]     EF 20-25% (10/16), 55% (06/16)    Chronic kidney disease     dialysis MWF    Chronic kidney disease, stage V ([facility]     Diabetes ([facility]     Dialysis patient ([facility]     Dialysis patient ([facility]     M-W-F; LEFT AVF; PERM CATH RIGHT CHEST    Gastrointestinal hemorrhage associated with peptic ulcer     Gastrointestinal hemorrhage associated with peptic ulcer 10/5/2016    Gout     Hypercholesterolemia     Hypertension     Primary hyperparathyroidism ([facility] 2010    s/p parathyroid adenoma excision    Secondary hyperparathyroidism (of renal origin)     Sleep apnea     C PAP    Status post nephrectomy     right    Thyroid disease       Past Surgical History:   Procedure Laterality Date    COLONOSCOPY  08/28/2015    Repeat 5 years    HX GYN      C-sections x 3    HX HEENT      total thyroidectomy    HX NEPHRECTOMY      right    HX THYROIDECTOMY      HX VASCULAR ACCESS      dialysis catheter R upper chest    VASCULAR SURGERY PROCEDURE UNLIST      fistula LUE     Current Outpatient Medications   Medication Sig Dispense Refill    atorvastatin (LIPITOR) 40 mg tablet TAKE 1/2 TABLET BY MOUTH DAILY 90 Tab 1    losartan (COZAAR) 100 mg tablet TAKE 1 TABLET BY MOUTH ONCE DAILY 90 Tab 1    metoprolol succinate (TOPROL-XL) 100 mg tablet Take 1 Tab by mouth daily. (Patient taking differently: Take 100 mg by mouth two (2) times a day.) 30 Tab 6    RENVELA 800 mg tab tab Take 800 mg by mouth three (3) times daily.  Patient reports medication is taken five times daily with meals and snacks  11    FABBY-DARLIN 0.8 mg tab tablet Take 0.8 mg by mouth daily. 11    OXYGEN-AIR DELIVERY SYSTEMS Take 3 L by inhalation continuous.  budesonide-formoterol (SYMBICORT) 160-4.5 mcg/actuation HFA inhaler Take 2 Puffs by inhalation two (2) times a day. Allergies   Allergen Reactions    Ace Inhibitors Cough    Aspirin Other (comments)     Hallucinations    Hydralazine Other (comments)     Hallucinations, dizziness and numbness in legs      Family History   Problem Relation Age of Onset    Hypertension Mother     Diabetes Mother     No Known Problems Father     No Known Problems Sister     No Known Problems Brother     No Known Problems Other     No Known Problems Brother     No Known Problems Brother     No Known Problems Daughter     No Known Problems Son     No Known Problems Son      Social History     Tobacco Use    Smoking status: Never Smoker    Smokeless tobacco: Never Used   Substance Use Topics    Alcohol use: No     Patient Active Problem List   Diagnosis Code    Gout M10.9    Hyperlipidemia E78.5    Tubular adenoma of colon D12.6    Anemia in chronic kidney disease N18.9, D63.1    MELANIE on CPAP G47.33, Z99.89    CHF (congestive heart failure) ([facility]) I50.9    Type 2 diabetes mellitus with hyperglycemia, with long-term current use of insulin ([facility]) E11.65, Z79.4    Advance directive discussed with patient Z70.80    Essential hypertension I10    Hypothyroidism due to acquired atrophy of thyroid E03.4    ESRD (end stage renal disease) on dialysis ([facility] [address] N18.6, Z99.2    Type 2 diabetes mellitus with nephropathy ([facility] [address] [facility] E11.21    Chronic obstructive pulmonary disease ([facility] [facility] J44.9       Depression Risk Factor Screening:     3 most recent PHQ Screens 6/3/2019   Little interest or pleasure in doing things Not at all   Feeling down, depressed, irritable, or hopeless Not at all   Total Score PHQ 2 0     Alcohol Risk Factor Screening:    You do not drink alcohol or very rarely. Functional Ability and Level of Safety:   Hearing Loss  Hearing is good. Activities of Daily Living  The home contains: handrails and grab bars  Patient needs help with:  transportation, shopping, preparing meals, laundry, housework and walking    Fall Risk  Fall Risk Assessment, last 12 mths 6/3/2019   Able to walk? Yes   Fall in past 12 months? Yes   Fall with injury? Yes   Number of falls in past 12 months 2   Fall Risk Score 3       Abuse Screen  Patient is not abused    Cognitive Screening   Evaluation of Cognitive Function:  Has your family/caregiver stated any concerns about your memory: no  Clock Drawing test    Patient Care Team   Patient Care Team:  Zabrina Vargas MD as PCP - General (Internal Medicine)  Shakeel Patel MD (Nephrology)  Ricardo Small MD (Cardiology)  Liliana Vasquez MD (Pulmonary Disease)  Jenifer King, EWA (Nurse Practitioner)  Anahy Westbrook MD (Endocrinology)  Megan Jones MD (Ophthalmology)    Assessment/Plan   Education and counseling provided:  Are appropriate based on today's review and evaluation  End-of-Life planning (with patient's consent)  Pneumococcal Vaccine-done  Influenza Vaccine-done  Screening Mammography-done 10/2018  Colorectal cancer screening tests-done  Cardiovascular screening blood test-lipids up to date  Bone mass measurement (DEXA)-osteoporosis  Screening for glaucoma-sees Dr Sami Young; being treated by glaucoma    Diagnoses and all orders for this visit:    1. Medicare annual wellness visit, subsequent-Refer to above for plan and to patient instructions for recommendations on HM    2. Disorder of bone and cartilage  -     DEXA BONE DENSITY STUDY AXIAL; Future    3.  Advance directive discussed with patient        Health Maintenance Due   Topic Date Due    Shingrix Vaccine Age 49> (1 of 2)-at her pharmacy 12/06/1993    EYE EXAM RETINAL OR DILATED -will get from Dr Sami Young 09/28/2017    FOOT EXAM Q1 -today 05/13/2019    MEDICARE YEARLY EXAM -today 05/11/2019    GLAUCOMA SCREENING Q2Y  06/22/2019     RTC for wellness visit

## 2019-06-03 NOTE — PROGRESS NOTES
Chief Complaint   Patient presents with   [facility] Annual Wellness Visit         Follow Up Chronic Condition       Pt is a 74y.o. year old female who presents for follow up of her chronic medical problems    Had another fall (bec of swivel chair per )  ? Saw neuro re possible NPH on CT scan-has an appt shortly  ?saw Ophtha-saw Dr José Acevedo; follow up in 1 yr she was told (so I supposed no papilledema seen)-will obtain copy  ED CT HEAD NO CONTRAST2/24/2019  [facility] [employer]  Result Impression     No clearly acute findings. Prominent cerebral level ventricles slightly disproportionate to degree of cerebral atrophy, may be due to combination of cortical and central atrophy. However correlate clinically for possibility of normal pressure hydrocephalus. Sclerotic calvarium, nonspecific, possibly due to renal osteodystrophy in this setting of chronic renal disease. Clinical correlation advised and consider further investigation if warranted. BP Readings from Last 3 Encounters:   06/03/19 128/47   04/23/19 130/64   05/10/18 159/65   BPs better; has been off Toprol the night prior to HD and this has improved the low BP readings    Saw ortho for possible fibular fracture? Has not heard yet; occasional pain on the left knee and left ankle  Able to walk with her cane at home since the fall  ANKLE COMPLETE LT2/24/2019  [address]  Result Impression     Limited study due to osteoporosis. Soft tissue swelling present. Questionable calcaneal fracture. KNEE 3 VIEWS LT2/24/2019  [facility] [employer]  Result Impression     Fibular head fracture not completely excluded. Clinical correlation for point tenderness?        Last Point of Care HGB A1C  Hemoglobin A1c (POC)   Date Value Ref Range Status   04/23/2019 5.5 % Final    needs foot check-sees Podiatry? no    Lab Results   Component Value Date/Time    TSH 5.850 (H) 05/08/2019 12:00 AM   off Synthroid by Renal per  bec of possible drug interaction-advised to ask again    Lab Results   Component Value Date/Time    Cholesterol, total 143 05/08/2019 12:00 AM    HDL Cholesterol 59 05/08/2019 12:00 AM    LDL, calculated 56 05/08/2019 12:00 AM    VLDL, calculated 28 05/08/2019 12:00 AM    Triglyceride 142 05/08/2019 12:00 AM    CHOL/HDL Ratio 2.3 09/29/2010 02:20 PM   On Lipitor-compliant    Saw Cardio for CHF? Not yet; cardio note on 5/10 said unable to reach;  will call Dr Devon Tan office    Abril Dior  Component Name Value Ref Range   EF Echo 60     Result Impression   :   NORMAL LEFT VENTRICULAR CAVITY SIZE AND SYSTOLIC FUNCTION WITH AN EJECTION FRACTION OF  60 %. MILD LEFT VENTRICULAR HYPERTROPHY. GRADE 2 DIASTOLIC DYSFUNCTION   LEFT ATRIAL ENLARGEMENT. MILD TO MODERATE MITRAL REGURGITATION. MILD AORTIC STENOSIS. MILD AORTIC, TRICUSPID AND PULMONIC REGURGITATION. MILDLY ELEVATED  PULMONARY ARTERY PRESSURE OF 43  MMHG. PRIOR STUDY DATED 3/26/14 REPORTED AORTIC GRADIENTS OF 26 MMHG/10 MMHG WITH VALVE AREA OF 1.5   CM AND ONLY MILDLY DILATED LEFT ATRIUM. ROS:    Pt denies: Wt loss, Fever/Chills, HA, Visual changes, Fatigue, Chest pain, SOB, LEBLANC, Abd pain, N/V/D/C, Blood in stool or urine, Edema. Pertinent positive as above in HPI.  All others were negative    Patient Active Problem List   Diagnosis Code    Gout M10.9    Hyperlipidemia E78.5    Tubular adenoma of colon D12.6    Anemia in chronic kidney disease N18.9, D63.1    MELANIE on CPAP G47.33, Z99.89    CHF (congestive heart failure) ([facility]) I50.9    Type 2 diabetes mellitus with hyperglycemia, with long-term current use of insulin ([facility]) E11.65, Z79.4    Advance directive discussed with patient Z70.80    Essential hypertension I10    Hypothyroidism due to acquired atrophy of thyroid E03.4    ESRD (end stage renal disease) on dialysis ([facility]) N18.6, Z99.2    Type 2 diabetes mellitus with nephropathy ([facility] [address] E11.21  Chronic obstructive pulmonary disease (HCC) J44.9       Past Medical History:   Diagnosis Date    Anemia in CKD (chronic kidney disease)     Aortic stenosis     Asthma     Calculus of kidney     Cancer ([facility] [address]     right kidney    [facility]     EF 20-25% (10/16), 55% (06/16)    Chronic kidney disease     dialysis MWF    Chronic kidney disease, stage V ([facility] [address]     Diabetes [facility]     [facility]     D[facility]     M-W-F; LEFT AVF; PERM CATH RIGHT CHEST    Gastrointestinal hemorrhage associated with peptic ulcer     Gastrointestinal hemorrhage associated with peptic ulcer 10/5/2016    Gout     Hypercholesterolemia     Hypertension     [facility]par[facility]roidism [facility] 2010    s/p parathyroid adenoma excision    Secondary hyperparathyroidism (of renal origin)     Sleep apnea     C PAP    Status post nephrectomy     right    Thyroid disease        Current Outpatient Medications   Medication Sig Dispense Refill    atorvastatin (LIPITOR) 40 mg tablet TAKE 1/2 TABLET BY MOUTH DAILY 90 Tab 1    losartan (COZAAR) 100 mg tablet TAKE 1 TABLET BY MOUTH ONCE DAILY 90 Tab 1    metoprolol succinate (TOPROL-XL) 100 mg tablet Take 1 Tab by mouth daily. (Patient taking differently: Take 100 mg by mouth two (2) times a day.) 30 Tab 6    RENVELA 800 mg tab tab Take 800 mg by mouth three (3) times daily. Patient reports medication is taken five times daily with meals and snacks  11    FABBY-DARLIN 0.8 mg tab tablet Take 0.8 mg by mouth daily. 11    OXYGEN-AIR DELIVERY SYSTEMS Take 3 L by inhalation continuous.  budesonide-formoterol (SYMBICORT) 160-4.5 mcg/actuation HFA inhaler Take 2 Puffs by inhalation two (2) times a day.            Social History     Tobacco Use   Smoking Status Never Smoker   Smokeless Tobacco Never Used       Allergies   Allergen Reactions    Ace Inhibitors Cough    Aspirin Other (comments)     Hallucinations    Hydralazine Other (comments) Hallucinations, dizziness and numbness in legs        Patient Labs were reviewed: yes      Patient Past Records were reviewed:  yes        Objective:     Vitals:    06/03/19 1314   BP: 128/47   Pulse: 87   Resp: 12   Temp: 98.2 °F (36.8 °C)   TempSrc: Oral   SpO2: 90%   Weight: 133 lb (60.3 kg)   Height: 4' 8\" (1.422 m)     Body mass index is 29.82 kg/m². Exam:   Appearance: alert, well appearing,  oriented to person, place, and time, acyanotic, in no respiratory distress and well hydrated. HEENT:  NC/AT, pink conj, anicteric sclerae  Neck:  No cervical lymphadenopathy, no JVD, no thyromegaly, no carotid bruit  Heart:  RRR, AS murmur heard  Lungs:  CTAB, no rhonchi, rales, or wheezes with good air exchange   Abdomen:  Non-tender, pos bowel sounds, no hepatosplenomegaly  Ext:  No C/C/E , shunt on the LUE   Skin: no rash  Neuro: no lateralizing signs, CNs II-XII intact  Diabetic foot exam:     Left Foot:   Visual Exam: normal    Pulse DP: 2+ (normal)   Filament test: normal sensation    Vibratory sensation: normal      Right Foot:   Visual Exam: normal    Pulse DP: 2+ (normal)   Filament test: normal sensation    Vibratory sensation: normal      Assessment/ Plan:   Diagnoses and all orders for this visit:    1. Medicare annual wellness visit, subsequent-see note below    2. Hypothyroidism due to acquired atrophy of thyroid-currently off Synthroid; advised to ask Renal why this was taken off; check TSh next visit as it was slightly high recently    3. Essential hypertension-controlled, continue with current regimen    4. Type 2 diabetes mellitus with hyperglycemia, without long-term current use of insulin (HCC)-at goal on diet/HD  -      DIABETES FOOT EXAM    5. Abnormal CT of the head-keep appt with neuro (?NPH)    6. Acute pain of left knee-will follow up on Ortho referral (possible fibular and calcaneal fracture after fall)    7.  Nonrheumatic aortic valve stenosis-advised to follow up with Cardio; needs follow up Echo    8. Congestive heart failure, unspecified HF chronicity, unspecified heart failure type (HCC)-as above          Follow-up and Dispositions    · Return in about 3 months (around 9/3/2019) for follow up. I have discussed the diagnosis with the patient and the intended plan as seen in the above orders. The patient has received an After-Visit Summary and questions were answered concerning future plans. Medication Side Effects and Warnings were discussed with patient: yes    Patient verbalized understanding of above instructions.     Nate Henson MD  Internal Medicine  [facility]

## 2019-06-03 NOTE — ACP (ADVANCE CARE PLANNING)
Advance Care Planning (ACP) Provider Conversation Snapshot    Date of ACP Conversation: 06/03/19  Persons included in Conversation:  patient and family  Length of ACP Conversation in minutes:  <16 minutes (Non-Billable)    Authorized Decision Maker (if patient is incapable of making informed decisions):    This person is:   her             For Patients with Decision Making Capacity:   Values/Goals: Exploration of values, goals, and preferences if recovery is not expected, even with continued medical treatment in the event of:  Imminent death  Severe, permanent brain injury    Conversation Outcomes / Follow-Up Plan:   Recommended completion of Advance Directive form after review of ACP materials and conversation with prospective healthcare agent

## 2019-06-03 NOTE — PATIENT INSTRUCTIONS
Medicare Wellness Visit, Female     The best way to live healthy is to have a lifestyle where you eat a well-balanced diet, exercise regularly, limit alcohol use, and quit all forms of tobacco/nicotine, if applicable. Regular preventive services are another way to keep healthy. Preventive services (vaccines, screening tests, monitoring & exams) can help personalize your care plan, which helps you manage your own care. Screening tests can find health problems at the earliest stages, when they are easiest to treat. [facility] follows the current, evidence-based guidelines published by the [facility] Amrit Mily ([facility]) when recommending preventive services for our patients. Because we follow these guidelines, sometimes recommendations change over time as research supports it. (For example, mammograms used to be recommended annually. Even though Medicare will still pay for an annual mammogram, the newer guidelines recommend a mammogram every two years for women of average risk.)  Of course, you and your doctor may decide to screen more often for some diseases, based on your risk and your health status. Preventive services for you include:  - Medicare offers their members a free annual wellness visit, which is time for you and your primary care provider to discuss and plan for your preventive service needs. Take advantage of this benefit every year!  -All adults over the age of 72 should receive the recommended pneumonia vaccines. Current USPSTF guidelines recommend a series of two vaccines for the best pneumonia protection.   -All adults should have a flu vaccine yearly and a tetanus vaccine every 10 years. All adults age 61 and older should receive a shingles vaccine once in their lifetime.    -A bone mass density test is recommended when a woman turns 65 to screen for osteoporosis. This test is only recommended one time, as a screening.  Some providers will use this same test as a disease monitoring tool if you already have osteoporosis. -All adults age 38-68 who are overweight should have a diabetes screening test once every three years.   -Other screening tests and preventive services for persons with diabetes include: an eye exam to screen for diabetic retinopathy, a kidney function test, a foot exam, and stricter control over your cholesterol.   -Cardiovascular screening for adults with routine risk involves an electrocardiogram (ECG) at intervals determined by your doctor.   -Colorectal cancer screenings should be done for adults age 54-65 with no increased risk factors for colorectal cancer. There are a number of acceptable methods of screening for this type of cancer. Each test has its own benefits and drawbacks. Discuss with your doctor what is most appropriate for you during your annual wellness visit. The different tests include: colonoscopy (considered the best screening method), a fecal occult blood test, a fecal DNA test, and sigmoidoscopy. -Breast cancer screenings are recommended every other year for women of normal risk, age 54-69.  -Cervical cancer screenings for women over age 72 are only recommended with certain risk factors.   -All adults born between [facility] should be screened once for Hepatitis C. Here is a list of your current Health Maintenance items (your personalized list of preventive services) with a due date:  Health Maintenance Due   Topic Date Due    Shingles Vaccine (1 of 2) 12/06/1993    Eye Exam  09/28/2017    Diabetic Foot Care  05/13/2019    Annual Well Visit  05/11/2019    Glaucoma Screening   06/22/2019         Medicare Wellness Visit, Female     The best way to live healthy is to have a lifestyle where you eat a well-balanced diet, exercise regularly, limit alcohol use, and quit all forms of tobacco/nicotine, if applicable. Regular preventive services are another way to keep healthy.  Preventive services (vaccines, screening tests, monitoring & exams) can help personalize your care plan, which helps you manage your own care. Screening tests can find health problems at the earliest stages, when they are easiest to treat. Skip Ortiz follows the current, evidence-based guidelines published by the [facility] Amrit Minor ([facility]) when recommending preventive services for our patients. Because we follow these guidelines, sometimes recommendations change over time as research supports it. (For example, mammograms used to be recommended annually. Even though Medicare will still pay for an annual mammogram, the newer guidelines recommend a mammogram every two years for women of average risk.)  Of course, you and your doctor may decide to screen more often for some diseases, based on your risk and your health status. Preventive services for you include:  - Medicare offers their members a free annual wellness visit, which is time for you and your primary care provider to discuss and plan for your preventive service needs. Take advantage of this benefit every year!  -All adults over the age of 72 should receive the recommended pneumonia vaccines. Current USPSTF guidelines recommend a series of two vaccines for the best pneumonia protection.   -All adults should have a flu vaccine yearly and a tetanus vaccine every 10 years. All adults age 61 and older should receive a shingles vaccine once in their lifetime.    -A bone mass density test is recommended when a woman turns 65 to screen for osteoporosis. This test is only recommended one time, as a screening. Some providers will use this same test as a disease monitoring tool if you already have osteoporosis.   -All adults age 38-68 who are overweight should have a diabetes screening test once every three years.   -Other screening tests and preventive services for persons with diabetes include: an eye exam to screen for diabetic retinopathy, a kidney function test, a foot exam, and stricter control over your cholesterol.   -Cardiovascular screening for adults with routine risk involves an electrocardiogram (ECG) at intervals determined by your doctor.   -Colorectal cancer screenings should be done for adults age 54-65 with no increased risk factors for colorectal cancer. There are a number of acceptable methods of screening for this type of cancer. Each test has its own benefits and drawbacks. Discuss with your doctor what is most appropriate for you during your annual wellness visit. The different tests include: colonoscopy (considered the best screening method), a fecal occult blood test, a fecal DNA test, and sigmoidoscopy. -Breast cancer screenings are recommended every other year for women of normal risk, age 54-69.  -Cervical cancer screenings for women over age 72 are only recommended with certain risk factors.   -All adults born between [facility] should be screened once for Hepatitis C.      Here is a list of your current Health Maintenance items (your personalized list of preventive services) with a due date:  Health Maintenance Due   Topic Date Due    Shingles Vaccine (1 of 2) 12/06/1993    Eye Exam  09/28/2017    Diabetic Foot Care  05/13/2019    Annual Well Visit  05/11/2019    Glaucoma Screening   06/22/2019

## 2019-06-03 NOTE — PROGRESS NOTES
1. Have you been to the ER, urgent care clinic since your last visit? Hospitalized since your last visit? No    2. Have you seen or consulted any other health care providers outside of the [address] [place] [address] since your last visit? Include any pap smears or colon screening.  No

## 2019-06-27 ENCOUNTER — APPOINTMENT (OUTPATIENT)
Dept: PHYSICAL THERAPY | Age: 74
End: 2019-06-27

## 2019-07-25 RX ORDER — LOSARTAN POTASSIUM 100 MG/1
TABLET ORAL
Qty: 90 TAB | Refills: 0 | Status: SHIPPED | OUTPATIENT
Start: 2019-07-25 | End: 2020-01-21

## 2019-08-15 ENCOUNTER — HOSPITAL ENCOUNTER (OUTPATIENT)
Dept: PHYSICAL THERAPY | Age: 74
Discharge: HOME | End: 2019-08-15
Payer: MEDICARE

## 2019-08-15 PROCEDURE — 97162 PT EVAL MOD COMPLEX 30 MIN: CPT

## 2019-08-15 NOTE — PROGRESS NOTES
[address]  [facility] PHYSICAL THERAPY  [address] [facility] [address], [address] - Phone: [phone]  Fax: [phone] / [address] [facility]  Patient Name: Rui Valera : 1945   Medical   Diagnosis: Gait instability [R26.81] Treatment Diagnosis: Apraxia following unspecified cerebrovascular disease   Onset Date: ~ 3 months ago     Referral Source: Renee Andrade MD Start of Replaced by [facility]): 8/15/2019   Prior Hospitalization: See medical history Provider #: [id number]   Prior Level of Function: Ambulating without assistive device; no back or LE pain   Comorbidities: s/p thyroidectomy, hypercholesterolemia, gout, sleep apnea, s/p partial right nephrectomy, HTN   Medications: Verified on Patient Summary List   The Plan of Care and following information is based on the information from the initial evaluation.   ===========================================================================================  Assessment / key information:  Patient is a 74y.o. year old female with chief complaint of low back pain, B LE pain, and imbalance. Patient reports 2 falls in the past 3 months. States she has started walking with a SPC. She reports low back and B LE pain after her first fall. She reports she needs help getting in/out of bed, negotiating steps, and standing up from chair. Functional limitations: 1) decreased transfers, 2) impaired bed mobility, 3) impaired stair negotiaton. Objective findings: 1) decreased B LE strength, 2) requires min A with stair negotiation, 3) impaired static standing balance, 4) gait speed of 0.17 meters/sec which indicates she is a household ambulator, 5) score of 28\" on the Five Times Sit to Stand test, indicating increased fall risk, 6) score of 12/28 on the Tinetti, indicating increased fall risk, 7) (+) form. force closure B, indicating decreased core stability, 8) poor bridge, indicating decreased core strength. Patient will benefit from skilled physical therapy services to address these issues. Thank you for this referral.     Sofy May (Focused on Therapeutic Outcomes) Functional Status score = 34/100, which corresponds to a functional limitation of 66%. Balance/ Equilibrium:       Eyes Open Eyes Closed   Romberg 30\" 30\"   Stance on Rail NT NT   Romberg on Foam 30\" 30\"                                                                                            Eyes Open              Eyes Closed                            Right            Left           Right             Left  Sharpened Romberg 12\" 15\" NT NT   Single Leg Stance NT NT NT NT     Strength (MMT): measured in sitting  Shoulder L (1-5) R (1-5)   Shoulder Flexion 4 4   Shoulder Ext       Shoulder ABD 4 4   Shoulder ADD       Shoulder IR       Shoulder ER                                                 Hip L (1-5) R (1-5)   Hip Flexion 4 3 p!    Hip Ext 3 3   Hip ABD 3 3   Hip ADD 4 4   Hip ER 3 3   Hip IR 3 3      Knee L (1-5) R (1-5)   Knee Flexion 4- 4-   Knee Extension 4- 4-   Ankle PF       Ankle DF 3 3       ===========================================================================================  Eval Complexity: History: HIGH Complexity :3+ comorbidities / personal factors will impact the outcome/ POC Exam:MEDIUM Complexity : 3 Standardized tests and measures addressing body structure, function, activity limitation and / or participation in recreation  Presentation: MEDIUM Complexity : Evolving with changing characteristics  Clinical Decision Making:MEDIUM Complexity : FOTO score of 26-74Overall Complexity:MEDIUM    Problem List: pain affecting function, decrease ROM, decrease strength, impaired gait/ balance, decrease ADL/ functional abilitiies, decrease activity tolerance, decrease flexibility/ joint mobility and decrease transfer abilities   Treatment Plan may include any combination of the following: Therapeutic exercise, Therapeutic activities, Neuromuscular re-education, Physical agent/modality, Gait/balance training, Manual therapy and Patient education  Patient / Family readiness to learn indicated by: asking questions, trying to perform skills and interest  Persons(s) to be included in education: patient (P)  Barriers to Learning/Limitations: None  Measures taken:    Patient Goal (s): \"less pain, walking better, able to climb stairs, walking without a cane\"   Patient self reported health status: fair  Rehabilitation Potential: good   Short Term Goals: To be accomplished in  4  weeks:  1. Patient will score > 14/28 on the Tinetti, indicating increased safety with gait. 2.  Patient will be compliant with home exercise program.  3.  Patient will demonstrate ability to perform 10 reps of supine bridge with good body mechanics to indicate increased core stability.  Long Term Goals: To be accomplished in  8  weeks:  1. Patient will increase FOTO Functional Status score to 50/100 to indicate decreased functional limitations. 2.  Patient will score > 16/28 on the Tinetti indicating decreased fall risk. 3.  Patient will be independent with home exercise program for self management of symptoms. 4.  Patient will demonstrate ability to perform stair negotiation with 1 HR and supervision to increase safety and independence in her home. Frequency / Duration:   Patient to be seen  2  times per week for 8  weeks:  Patient / Caregiver education and instruction: self care and exercises    Therapist Signature: Vladimir Henson PT Date: 5/22/1486   Certification Period: 8/15/2019 - 11/14/2019 Time: 2:58 PM   ===========================================================================================  I certify that the above Physical Therapy Services are being furnished while the patient is under my care. I agree with the treatment plan and certify that this therapy is necessary.     Physician Signature:        Date: Time:     Please sign and return to [facility] or you may fax the signed copy to [phone]. Thank you.

## 2019-08-15 NOTE — PROGRESS NOTES
PHYSICAL THERAPY - DAILY TREATMENT NOTE    Patient Name: Tomer Anderson        Date: 8/15/2019  : 1945   YES Patient  Verified  Visit #:      [id number]  Insurance: Payor: Roque Saunders / Plan: [address] [place] [address] HMO / Product Type: Managed Care Medicare /      In time: 1:32 Out time: 2:35   Total Treatment Time: 63     Medicare Time Tracking (below)   Total Timed Codes (min):  5 1:1 Treatment Time:  5     TREATMENT AREA =  Apraxic gait    SUBJECTIVE    Pain Level (on 0 to 10 scale):  7  / 10   Medication Changes/New allergies or changes in medical history, any new surgeries or procedures? NO    If yes, update Summary List   Subjective Functional Status/Changes:  []  No changes reported     States she has fallen twice in the past year. She is a poor historian and cannot remember the dates or months when the falls occurred. She fell in [facility] wouldn't move across floors\"). The second fall occurred when she sat down in a chair and the chair moved. States she injured her left ankle on the first fall. and she was given an ankle brace. States she also injured B knees. States her right ankle no longer hurts, but she continues to have B knee pain. States her knees hurts when negotiating steps and getting out of bed. States she hurt her lower back in the second fall but did not seek medical treatment. She ambulates with a SPC which she has used for the past 2 months. She lives with her  in a 2 story home with bedroom upstairs. She has B HR on steps inside. There are 7 GEOVANY with B HR (too wide to use both). States she needs help from her  to help negotiate steps. She states \"He pulls me up so I won't fall back. \"  She reports mild dizziness occasionally with headaches. Goal: \"walk without cane\"  States pain in low back and B knees is hindering her walking. States her  has to help her with sit to stand transfers and getting out of bed. OBJECTIVE    Physical Therapy Evaluation  Neurologic    Posture: [] Poor    [x] Fair    [] Good    Describe: Increased thoracic kyphosis, forward head, flexed trunk    Gait: [] Normal    [x] Abnormal    Device:  SPC  Describe:  Decreased gait speed, trunk flexion, decreased stride length B, decreased foot clearance B    ROM:                             AROM    PROM   Shoulder Left Right Left Right   Flex Grossly 90 deg Grossly 90 deg Grossly 110 deg Grossly 90 deg   Ext       ABD dec dec     ER       IR                AROM    PROM   Knee Left Right Left Right   Ext [facility]   Flex [place] WFL             AROM                           PROM  Hip Left Right Left Right   Flex [facility] WFL   Ext [facility] WFL   ABD [place]   ER [place] WFL WFL   IR WFL WFL WFL WFL                                            AROM      PROM   Ankle Left Right Left Right   Ext [facility] WFL   Flex [facility] WFL WFL     Strength (MMT): measured in sitting  Shoulder L (1-5) R (1-5)   Shoulder Flexion 4 4   Shoulder Ext     Shoulder ABD 4 4   Shoulder ADD     Shoulder IR     Shoulder ER                                               Hip L (1-5) R (1-5)   Hip Flexion 4 3 p!    Hip Ext 3 3   Hip ABD 3 3   Hip ADD 4 4   Hip ER 3 3   Hip IR 3 3     Knee L (1-5) R (1-5)   Knee Flexion 4- 4-   Knee Extension 4- 4-   Ankle PF     Ankle DF 3 3   Other       Tone: WNL    Motor Control: WNL    Sensation:    Reflexes: [x] Not Tested   Left Right   Biceps (C5)     Brachioradiais (C6)     Triceps (C7)     Knee Jerk (L4)     Ankle Jerk (SI)       Balance/ Equilibrium:     Eyes Open Eyes Closed   Romberg 30\" 30\"   Stance on Rail NT NT   Romberg on Foam 30\" 30\"              Eyes Open         Eyes Closed                            Right            Left           Right             Left  Sharpened Romberg 12\" 15\" NT NT   Single Leg Stance NT NT NT NT     Functional Mobility      Bed Mobility:      Scooting:        Rolling: mod I Sit-Supine: min A (help with B LE's)      Transfers:       Sit-Stand: B UE assist       Floor-Stand:       Gait:       Tandem:       Backwards:  Requires min A with stair negotiation for balance       Behavior: [x] Cooperative    [] Impulsive    [] Agitated    [] Perseverative    [] Confused   Oriented x:    Cognition: [] One Step Commands   [x] Multiple Commands   [] Displays Neglect [] R  [] L    Other:       Impaired Judgement: [] Y    [x] N      Impaired Vision:  [] Y    [x] N      Safety Awareness Deficits  [] Y    [x] N      Impaired Hearing  [] Y    [x] N      Able to Express Needs [x] Y    [] N    Optional Tests:       Dynamic Gait Index (24pt scale): Functional Gait Assessment (30pt scale):       Carrizales Balance Scale (56pt scale):      5x Sit to Stand: 28\" (using B armrests on standard chair)      Tinetti: 12/28      10 Meter Walk Test: 58\" which correlates to a gait speed of 0.17 m/sec and indicates patient is a household ambulator      Form/Force Closure: (+) B      Poor bridge    Coordination Testing:       Disdiadochokinesia [] WFL    [] Impaired    Describe:       Heel - Wise  [] WFL    [] Impaired    Describe:       FNF   [] WFL    [] Impaired    Describe: Toe Tap   [] WFL    [] Impaired    Describe:    Oculomotor Tests: (Fixation Not Blocked)       Ocular ROM:   [] WFL    [] Limited    Describe:       Spontaneous Nystag. [] Neg     [] Pos    [] Left    [] Right       Gaze Holding Nystag.  [] Neg     [] Pos    [] Left    [] Right        Smooth Pursuit  [] Neg     [] Pos    [] Left    [] Right        Saccades   [] Neg     [] Pos    [] Left    [] Right        VOR - Slow Head Mvmt [] Neg     [] Pos    [] Left    [] Right        VOR - Fast Head Mvmt [] Neg     [] Pos    [] Left    [] Right        Head Thrust  [] Neg     [] Pos    [] Left    [] Right        Static Visual Acuity [] Neg     [] Pos    [] Left    [] Right        Dynamic Visual Acuity [] Neg     [] Pos    [] Left    [] Right     5 min Therapeutic Exercise:  [x]  See flow sheet   Rationale:      increase ROM and increase strength to improve the patients ability to perform ADL's, gait, and functional mobility with increased safety/independence and decreased pain. min Patient Education:  YES  Reviewed HEP   []  Progressed/Changed HEP based on: Other Objective/Functional Measures:    See eval     Post Treatment Pain Level (on 0 to 10) scale:   7  / 10     ASSESSMENT  Assessment/Changes in Function:     Justification for Eval Code Complexity:  Patient History (low 0, mod 1-2, high 3-4): high (s/p thyroidectomy, hypercholesterolemia, gout, sleep apnea, s/p partial right nephrectomy, HTN)  Examination (low 1-2, mod 3+, high 4+): mod (see above)  Clinical Presentation (low stable or uncomplicated, mod evolving or changing, high unstable or unpredictable): mod   Clinical Decision Making (low , mod 26-74, high 1-25): FOTO = 34/100 mod     []  See Progress Note/Recertification   Patient will continue to benefit from skilled PT services to modify and progress therapeutic interventions, address functional mobility deficits, address ROM deficits, address strength deficits, analyze and address soft tissue restrictions, analyze and cue movement patterns, analyze and modify body mechanics/ergonomics, assess and modify postural abnormalities, address imbalance/dizziness and instruct in home and community integration to attain remaining goals. Progress toward goals / Updated goals:    Goals established.       PLAN    [x]  Upgrade activities as tolerated YES Continue plan of care   []  Discharge due to :    []  Other:      Therapist: Dipika Herrmann PT    Date: 8/15/2019 Time: 1:41 PM     Future Appointments   Date Time Provider Marla Pollard   9/4/2019  1:15 PM MD CEDRIC Dean   6/3/2020  1:00 PM Mauri Garcia MD [id number] [facility] [address]

## 2019-08-20 ENCOUNTER — HOSPITAL ENCOUNTER (OUTPATIENT)
Dept: PHYSICAL THERAPY | Age: 74
Discharge: HOME | End: 2019-08-20
Payer: MEDICARE

## 2019-08-20 PROCEDURE — 97110 THERAPEUTIC EXERCISES: CPT

## 2019-08-20 NOTE — PROGRESS NOTES
PHYSICAL THERAPY - DAILY TREATMENT NOTE    Patient Name: Que Barcenas        Date: 2019  : 1945   YES Patient  Verified  Visit #:   2   of   16  Insurance: Payor: Ramona Rubio / Plan: [address] [place] [address] HMO / Product Type: Managed Care Medicare /      In time: 1:00 Out time: 1:28   Total Treatment Time: 28     Medicare/BCBS [place] Time Tracking (below)   Total Timed Codes (min):  28 1:1 Treatment Time:  28     TREATMENT AREA =  Gait instability [R26.81]  SUBJECTIVE    Pain Level (on 0 to 10 scale):  8  / 10   Medication Changes/New allergies or changes in medical history, any new surgeries or procedures? NO    If yes, update Summary List   Subjective Functional Status/Changes:  []  No changes reported     Pt reports 8/10 lower back and B LE pain.           OBJECTIVE    28 min Therapeutic Exercise:  [x]  See flow sheet   Rationale:      increase ROM, increase strength, improve coordination, improve balance and increase proprioception to improve the patients ability to perform ADLs/IADLs, functional mobility and gait safely and independently without increased pain/symptoms     During TE min Patient Education:  YES  Reviewed HEP   []  Progressed/Changed HEP based on:   Initiated HEP     Other Objective/Functional Measures:    Patient presents in transport w/c  Patient transfers sit to stand with S  Patient ambulates ~ 3 steps w/c to mat and mat to w/c with SPC with CG, attempts to place B UE on SPC and demonstrates flexed posture, decreased step length, decreased foot clearance, right lateral trunk lean to advance left LE  Patient transfers sit to supine with min A for B LE, supine to sit with mod A for B LUE and trunk  Initiated exercises per flowsheet     Post Treatment Pain Level (on 0 to 10) scale:   7  / 10     ASSESSMENT    Assessment/Changes in Function:     Tolerated exercises with report of decreased pain at completion of treatment session     []  See Progress Note/Recertification   Patient will continue to benefit from skilled PT services to modify and progress therapeutic interventions, address functional mobility deficits, address ROM deficits, address strength deficits, analyze and address soft tissue restrictions, analyze and cue movement patterns, analyze and modify body mechanics/ergonomics, assess and modify postural abnormalities, address imbalance/dizziness and instruct in home and community integration to attain remaining goals. Progress toward goals / Updated goals:    Progressing slowly toward goals: · Short Term Goals: To be accomplished in  4  weeks:  1. Patient will score > 14/28 on the Tinetti, indicating increased safety with gait. 2.  Patient will be compliant with home exercise program. - Initiated HEP  3. Patient will demonstrate ability to perform 10 reps of supine bridge with good body mechanics to indicate increased core stability. - Initiated bridge     · Long Term Goals: To be accomplished in  8  weeks:  1. Patient will increase FOTO Functional Status score to 50/100 to indicate decreased functional limitations. 2.  Patient will score > 16/28 on the Tinetti indicating decreased fall risk. 3.  Patient will be independent with home exercise program for self management of symptoms. 4.  Patient will demonstrate ability to perform stair negotiation with 1 HR and supervision to increase safety and independence in her home.        PLAN    [x]  Upgrade activities as tolerated YES Continue plan of care   []  Discharge due to :    []  Other:      Therapist: Carlos Candelario PT    Date: 8/20/2019 Time: 1:12 PM     Future Appointments   Date Time Provider Marla Pollard   8/27/2019  1:00 PM Aguust Barcenas [address] [place] [address]   8/29/2019  2:30 PM [facility] AT [facility]   9/3/2019  1:00 PM Arie Betts [address] [place] [address]   9/4/2019  1:15 PM Leona Sullivan MD [address] [facility]   9/5/2019  1:00 PM Arie Betts PT [facility] 9/10/2019  1:00 PM Jacinta Devlin, PT [facility]   9/12/2019  1:00 PM Jacinta Devlin, PT [facility]   9/17/2019  1:00 PM Jacinta Devlin, PT [facility]   9/19/2019  1:00 PM Jacinta Devlin, PT [facility]   9/24/2019  1:00 PM Jacinta Devlin, PT [facility]   9/26/2019  1:00 PM Jacinta Devlin, [address] [place] [address]   6/3/2020  1:00 PM Keiko Dukes MD [address] [facility] 2299

## 2019-08-27 ENCOUNTER — HOSPITAL ENCOUNTER (OUTPATIENT)
Dept: PHYSICAL THERAPY | Age: 74
Discharge: HOME | End: 2019-08-27
Payer: MEDICARE

## 2019-08-27 PROCEDURE — 97110 THERAPEUTIC EXERCISES: CPT

## 2019-08-27 PROCEDURE — 97530 THERAPEUTIC ACTIVITIES: CPT

## 2019-08-27 NOTE — PROGRESS NOTES
PHYSICAL THERAPY - DAILY TREATMENT NOTE    Patient Name: Blessing Null        Date: 2019  : 1945   YES Patient  Verified  Visit #:   3   of   16  Insurance: Payor: Megan Ray / Plan: [address] [place] [address] HMO / Product Type: Managed Care Medicare /      In time: 12:58 Out time: 1:30   Total Treatment Time: 32     Medicare/BCBS [place] Time Tracking (below)   Total Timed Codes (min):  32 1:1 Treatment Time:  32     TREATMENT AREA =  Gait instability [R26.81]  SUBJECTIVE    Pain Level (on 0 to 10 scale):  3  / 10   Medication Changes/New allergies or changes in medical history, any new surgeries or procedures? NO    If yes, update Summary List   Subjective Functional Status/Changes:  []  No changes reported     Pt reports lower back pain, B thigh pain. Pt reports that she has been doing exercise with ball at home and it has been helping.        OBJECTIVE    20 min Therapeutic Exercise:  [x]  See flow sheet   Rationale:      increase ROM, increase strength, improve coordination, improve balance and increase proprioception to improve the patients ability to perform ADLs/IADLs, functional mobility and gait safely and independently without increased pain/symptoms     12 min Therapeutic Activity: Bed mobility, transfers, gait   Rationale: improve functional mobility and gait to improve the patient's ability to perform ADLs/IADLs, functional mobility and gait safely and independently without increased pain/symptoms      During TE min Patient Education:  YES  Reviewed HEP   []  Progressed/Changed HEP based on:   Cont HEP     Other Objective/Functional Measures:    Presented in transport w/c - transferred sit to stand from transport w/c and transferred from transport w/c to [facility] using SPC with CG, transferred sit to stand from [facility] with CG, ambulated 30' across clinic with [facility] with CG (demonstrated incorrect gait sequence - SPC in right hand, moves SPC then steps with right LE then left LE), transferred sit to supine with min A for B LE and supine to sit with min A for trunk, transferred sit to stand from mat with CG, ambulated 50' across clinic with [facility] with CG and verbal cues for correct sequence  Initiated Nustep  Added resistance clam  Performed march in H/L       Post Treatment Pain Level (on 0 to 10) scale:   3  / 10     ASSESSMENT    Assessment/Changes in Function:     Tolerated exercises without complaints     []  See Progress Note/Recertification   Patient will continue to benefit from skilled PT services to modify and progress therapeutic interventions, address functional mobility deficits, address ROM deficits, address strength deficits, analyze and address soft tissue restrictions, analyze and cue movement patterns, analyze and modify body mechanics/ergonomics, assess and modify postural abnormalities, address imbalance/dizziness and instruct in home and community integration to attain remaining goals. Progress toward goals / Updated goals:    Progressing toward goals: · Short Term Goals: To be accomplished in  4  weeks:  1.  Patient will score > 14/28 on the Tinetti, indicating increased safety with gait. 2.  Patient will be compliant with home exercise program. - Reports compliance with HEP  3.  Patient will demonstrate ability to perform 10 reps of supine bridge with good body mechanics to indicate increased core stability. - Initiated bridge     · Long Term Goals: To be accomplished in  8  weeks:  1.  Patient will increase FOTO Functional Status score to 50/100 to indicate decreased functional limitations. 2.  Patient will score > 16/28 on the Tinetti indicating decreased fall risk. 3.  Patient will be independent with home exercise program for self management of symptoms. 4.  Patient will demonstrate ability to perform stair negotiation with 1 HR and supervision to increase safety and independence in her home.        PLAN    [x]  Upgrade activities as tolerated YES Continue plan of care   []  Discharge due to :    []  Other:      Therapist: Kim Fuentes PT    Date: 8/27/2019 Time: 12:57 PM     Future Appointments   Date Time Provider Marla Pollard   8/27/2019  1:00 PM Christina Acevedo, [address] [place] [address]   8/29/2019  2:30 PM Ivan [facility]   9/3/2019  1:00 PM Ibis Reddy, [address] [place] [address]   9/4/2019  1:15 PM Berny Akhtar MD [address] [facility] 8708   9/5/2019  1:00 PM Ibis Maureen, [facility]   9/10/2019  1:00 PM Ibis Maureen, PT [facility]   9/12/2019  1:00 PM Ibis Maureen, PT [facility]   9/17/2019  1:00 PM Ibis Maureen, PT [facility]   9/19/2019  1:00 PM Ibis Maureen, PT [facility]   9/24/2019  1:00 PM Ibis Maureen, PT [facility]   9/26/2019  1:00 PM Ibis Reddy, [address] [place] [address]   6/3/2020  1:00 PM Berny Akhtar MD [address] [facility] 9216

## 2019-08-29 ENCOUNTER — HOSPITAL ENCOUNTER (OUTPATIENT)
Dept: PHYSICAL THERAPY | Age: 74
Discharge: HOME | End: 2019-08-29
Payer: MEDICARE

## 2019-08-29 PROCEDURE — 97110 THERAPEUTIC EXERCISES: CPT

## 2019-08-29 PROCEDURE — 97112 NEUROMUSCULAR REEDUCATION: CPT

## 2019-08-29 NOTE — PROGRESS NOTES
PHYSICAL THERAPY - DAILY TREATMENT NOTE    Patient Name: Mari Hanks        Date: 2019  : 1945   YES Patient  Verified  Visit #:     Insurance: Payor: Clarke Peadebbie / Plan: [address] [place] [address] HMO / Product Type: Managed Care Medicare /      In time: 2:35 Out time: 3:05   Total Treatment Time: 30     Medicare/BCBS [place] Time Tracking (below)   Total Timed Codes (min):  30 1:1 Treatment Time:  30     TREATMENT AREA =  Gait instability [R26.81]    SUBJECTIVE    Pain Level (on 0 to 10 scale):  3  / 10   Medication Changes/New allergies or changes in medical history, any new surgeries or procedures? NO    If yes, update Summary List   Subjective Functional Status/Changes:  []  No changes reported     Pt reports her back was really sore after last PT session, states she felt it when she sat down. Pt c/o painful pricking pain in her arms, legs and chest, states it comes and goes. OBJECTIVE    20 min Therapeutic Exercise:  [x]  See flow sheet   Rationale:     increase ROM, increase strength, improve coordination, improve balance and increase proprioception to promote increased functional mobility and increased activity tolerance with ADL's. 10 min Neuromuscular Re-ed: Static standing balance exercises with EO/EC    Rationale:   improve coordination, improve balance and increase proprioception to improve the patients ability to perform ADLs, transfers and gait safely and independently. min Patient Education:  YES  Reviewed HEP   [x]  Progressed/Changed HEP based on: Added balance ex to HEP-see copy in chart     Other Objective/Functional Measures:    Initiated static standing balance exercise:   EO ROM 30\"   EC Stance 30\"     Added standing hip 3 way, had pt perform 5 reps each secondary to report of ms fatigue.      Post Treatment Pain Level (on 0 to 10) scale:   3  / 10     ASSESSMENT    Assessment/Changes in Function:     Pt with good tolerance to initiation of standing strength and balance ex. []  See Progress Note/Recertification   Patient will continue to benefit from skilled PT services to modify and progress therapeutic interventions, address functional mobility deficits, address ROM deficits, address strength deficits, analyze and address soft tissue restrictions, analyze and cue movement patterns, analyze and modify body mechanics/ergonomics, assess and modify postural abnormalities, address imbalance/dizziness and instruct in home and community integration to attain remaining goals. Progress toward goals / Updated goals:    1. Patient will score > 14/28 on the Tinetti, indicating increased safety with gait. Initiated balance ex this session   2. Patient will be compliant with home exercise program.  3.  Patient will demonstrate ability to perform 10 reps of supine bridge with good body mechanics to indicate increased core stability.      PLAN    []  Upgrade activities as tolerated YES Continue plan of care   []  Discharge due to :    []  Other:      Therapist: Cassi Childs PTA    Date: 8/29/2019 Time: 2:53 PM     Future Appointments   Date Time Provider Marla Pollard   9/3/2019  1:00 PM Christina Trevizo, [facility]   9/4/2019  1:15 PM Peyton Hartley MD [address] [facility] 8020   9/5/2019  1:00 PM Christina Trevizo, [facility]   9/10/2019  1:00 PM Christina Trevizo, [facility]   9/12/2019  1:00 PM Christina Trevizo, [facility]   9/17/2019  1:00 PM Christina Trevizo, [facility]   9/19/2019  1:00 PM Christina Trevizo, [facility]   9/24/2019  1:00 PM Christina Lugo, [facility]   9/26/2019  1:00 PM Christina Trevizo, [address] [place] [address]   6/3/2020  1:00 PM Peyton Hartley MD [address] [facility] 1117

## 2019-09-03 ENCOUNTER — HOSPITAL ENCOUNTER (OUTPATIENT)
Dept: PHYSICAL THERAPY | Age: 74
Discharge: HOME | End: 2019-09-03
Payer: MEDICARE

## 2019-09-03 PROCEDURE — 97110 THERAPEUTIC EXERCISES: CPT

## 2019-09-03 NOTE — PROGRESS NOTES
PHYSICAL THERAPY - DAILY TREATMENT NOTE    Patient Name: Que Barcenas        Date: 9/3/2019  : 1945   YES Patient  Verified  Visit #:      of   16  Insurance: Payor: Ramona Rubio / Plan: [address] [place] [address] HMO / Product Type: Managed Care Medicare /      In time: 12:54 Out time: 1:35   Total Treatment Time: 41     Medicare/BCBS [place] Time Tracking (below)   Total Timed Codes (min):  41 1:1 Treatment Time:  41     TREATMENT AREA =  Gait instability [R26.81]    SUBJECTIVE    Pain Level (on 0 to 10 scale):  2  / 10   Medication Changes/New allergies or changes in medical history, any new surgeries or procedures?    no    If yes, update Summary List   Subjective Functional Status/Changes:  []  No changes reported     \"Sometimes the exercises help with the back pain. \"          OBJECTIVE    41 min Therapeutic Exercise:  [x]  See flow sheet   Rationale:      increase ROM, increase strength, improve coordination, improve balance and increase proprioception to improve the patients ability to perform ADL's, gait, and functional mobility with decreased pain and increased safety. min Patient Education:  YES  Reviewed HEP   []  Progressed/Changed HEP based on:   Cont HEP     Other Objective/Functional Measures:    Patient presents to PT in a transport chair on which the right brake will not lock. Therefore, therapist held chair when patient transferred sit to stand using parallel bars for UE support/assist. Therapist again secured chair during [facility] transfer. Added seated trunk flexion. Patient ambulated 27' with SPC and CGA with decreased gait speed and increased WB through UE's. Added supine leg press, supine isometric hip flexion, and lumbar trunk rotation. Post Treatment Pain Level (on 0 to 10) scale:   0  / 10     ASSESSMENT    Assessment/Changes in Function:     Patient reported increased pain in right LE during standing hip extension with right LE.  Pain abolished with seated trunk flexion. Patient challenged with H/L TA march. Patient with decreased range during bridge today. []  See Progress Note/Recertification   Patient will continue to benefit from skilled PT services to modify and progress therapeutic interventions, address functional mobility deficits, address ROM deficits, address strength deficits, analyze and address soft tissue restrictions, analyze and cue movement patterns, analyze and modify body mechanics/ergonomics, assess and modify postural abnormalities, address imbalance/dizziness and instruct in home and community integration to attain remaining goals. Progress toward goals / Updated goals:    1.  Patient will score > 14/28 on the Tinetti, indicating increased safety with gait. 2.  Patient will be compliant with home exercise program.reports compliance with HEP. 3.  Patient will demonstrate ability to perform 10 reps of supine bridge with good body mechanics to indicate increased core stability.  Performed 12 reps of bridge today as above     PLAN    [x]  Upgrade activities as tolerated YES Continue plan of care   []  Discharge due to :    []  Other:      Therapist: Francia Cross PT    Date: 9/3/2019 Time: 7:48 AM     Future Appointments   Date Time Provider Marla Pollard   9/3/2019  1:00 PM Valentino Malady, [facility]   9/4/2019  1:15 PM Richie Srivastava  [facility]   9/5/2019  1:00 PM Valentino Malady, [facility]   9/10/2019  1:00 PM Valentino Malady, PT [facility]   9/12/2019  1:00 PM Valentino Malady, [facility]   9/17/2019  1:00 PM Valentino Malady, [facility]   9/19/2019  1:00 PM Valentino Malady, [facility]   9/24/2019  1:00 PM Jessica Encinas, [facility]   9/26/2019  1:00 PM Valentino Malady, [address] [place] [address]   6/3/2020  1:00 PM Richie Srivastava MD [address] [place] [address]

## 2019-09-04 ENCOUNTER — OFFICE VISIT (OUTPATIENT)
Dept: FAMILY MEDICINE CLINIC | Age: 74
End: 2019-09-04

## 2019-09-04 VITALS
HEIGHT: 56 IN | TEMPERATURE: 98.4 F | SYSTOLIC BLOOD PRESSURE: 116 MMHG | DIASTOLIC BLOOD PRESSURE: 55 MMHG | WEIGHT: 132 LBS | OXYGEN SATURATION: 93 % | BODY MASS INDEX: 29.69 KG/M2 | RESPIRATION RATE: 15 BRPM | HEART RATE: 79 BPM

## 2019-09-04 DIAGNOSIS — R29.6 FREQUENT FALLS: ICD-10-CM

## 2019-09-04 DIAGNOSIS — N18.6 ESRD (END STAGE RENAL DISEASE) ON DIALYSIS (HCC): ICD-10-CM

## 2019-09-04 DIAGNOSIS — E66.3 OVERWEIGHT (BMI 25.0-29.9): ICD-10-CM

## 2019-09-04 DIAGNOSIS — I35.0 NONRHEUMATIC AORTIC VALVE STENOSIS: ICD-10-CM

## 2019-09-04 DIAGNOSIS — Z99.2 ESRD (END STAGE RENAL DISEASE) ON DIALYSIS (HCC): ICD-10-CM

## 2019-09-04 DIAGNOSIS — E03.4 HYPOTHYROIDISM DUE TO ACQUIRED ATROPHY OF THYROID: ICD-10-CM

## 2019-09-04 DIAGNOSIS — E11.65 TYPE 2 DIABETES MELLITUS WITH HYPERGLYCEMIA, WITHOUT LONG-TERM CURRENT USE OF INSULIN (HCC): Primary | ICD-10-CM

## 2019-09-04 DIAGNOSIS — M79.605 LEFT LEG PAIN: ICD-10-CM

## 2019-09-04 DIAGNOSIS — I10 ESSENTIAL HYPERTENSION: ICD-10-CM

## 2019-09-04 DIAGNOSIS — M79.672 PAIN OF LEFT HEEL: ICD-10-CM

## 2019-09-04 LAB — HBA1C MFR BLD HPLC: 5.6 %

## 2019-09-04 NOTE — PROGRESS NOTES
Chief Complaint   Patient presents with    Follow Up Chronic Condition     3 month; patient not fasting       Pt is a 74y.o. year old female who presents for follow up of her chronic medical problems    Last Point of Care HGB A1C  Hemoglobin A1c (POC)   Date Value Ref Range Status   09/04/2019 5.6 % Final    currently not on any diabetes meds    BP Readings from Last 3 Encounters:   09/04/19 116/55   06/03/19 128/47   04/23/19 130/64   On Losartan, Toprol    Lab Results   Component Value Date/Time    Cholesterol, total 143 05/08/2019 12:00 AM    HDL Cholesterol 59 05/08/2019 12:00 AM    LDL, calculated 56 05/08/2019 12:00 AM    VLDL, calculated 28 05/08/2019 12:00 AM    Triglyceride 142 05/08/2019 12:00 AM    CHOL/HDL Ratio 2.3 09/29/2010 02:20 PM   On Lipitor-compliant    Freq falls-sent to Neuro bec of ?of NPH on CT head  Sukumar Johnson again  Going to PT now, sent there from Neuro; also had an MRI  MR HEAD W/O CONTRAST6/27/2019  [facility]  Result Impression   1. No acute brain disease. 2. Small amount of normal senescent white matter changes. 3. 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  -No associated callosal dysgenesis or other congenital abnormalities. -May be an isolated finding. Saw Cardio, Dr Ke Ryder for AS   Recommendations/discussion:   Initial consult 8/29/2019 for diagnoses and conditions below with recommendations as follows:  1. Nonrheumatic aortic valve stenosis  - EKG 12-LEAD  - Echo Cardiogram Complete; Future  Slight chest pain in the setting of a cold and a cough. This was clearly related to her cough and made worse with coughing. We will continue observation and check her valve with echocardiogram. No indication for stress testing at this juncture. 2. Hypertensive heart disease with chronic diastolic congestive heart failure ([address]  Improved with dialysis, continue volume control with hemodialysis  3. Aortic valve disease  Follow-up echo  4.  Essential hypertension  Blood pressure is controlled, continue current treatment  5. Hypercholesterolemia  Statin and follow-up labs with primary care  Note that she is not on thyroid replacement and will need monitoring of her thyroid through primary care  Return in about 6 months (around 2/29/2020), or if symptoms worsen or fail to improve. Lab Results   Component Value Date/Time    TSH 5.880 (H) 09/04/2019 02:32 AM   Used to be on Levothyroxine way back when she says    On HD 3x a week    Never heard from Ortho appt  Mahendra Bo in 4/2019 and hurt left LE  Xrays showed :  KNEE 3 VIEWS LT2/24/2019  [address]  Result Impression     Fibular head fracture not completely excluded. Clinical correlation for point tenderness? ANKLE COMPLETE LT2/24/2019  [facility]  Result Impression     Limited study due to osteoporosis. Soft tissue swelling present. Questionable calcaneal fracture. Having a lot of pain which is worse with PT  And fell again recently, having prob moving the left foot      Health Maintenance Due   Topic Date Due    Shingrix Vaccine Age 49> (1 of 2)-advised to get this at her pharmacy 12/06/1993    EYE EXAM RETINAL OR DILATED -at [facility] 09/28/2017    GLAUCOMA SCREENING Q2Y  06/22/2019    Influenza Age 9 to Adult -done yesterday at [facility] 08/01/2019           ROS:    Pt denies: Wt loss, Fever/Chills, HA, Visual changes, Fatigue, Chest pain, SOB, LEBLANC, Abd pain, N/V/D/C, Blood in stool or urine, Edema. Pertinent positive as above in HPI.  All others were negative    Patient Active Problem List   Diagnosis Code    Gout M10.9    Hyperlipidemia E78.5    Tubular adenoma of colon D12.6    Anemia in chronic kidney disease N18.9, D63.1    MELANIE on CPAP G47.33, Z99.89    CHF (congestive heart failure) (HCC) I50.9    Type 2 diabetes mellitus with hyperglycemia, with long-term current use of insulin ([facility] E11.65, Z79.4    Advance directive discussed with patient Z70.80    Essential hypertension I10    Hypothyroidism due to acquired atrophy of thyroid E03.4    ESRD (end stage renal disease) on dialysis ([facility]) N18.6, Z99.2    Type 2 diabetes mellitus with nephropathy ([facility]) E11.21    Chronic obstructive pulmonary disease ([facility]) J44.9       Past Medical History:   Diagnosis Date    Anemia in CKD (chronic kidney disease)     Aortic stenosis     Asthma     Calculus of kidney     Cancer ([facility]     right kidney    Cardiomyopathy ([facility]     EF 20-25% (10/16), 55% (06/16)    Chronic kidney disease     dialysis MWF    Chronic kidney disease, stage V ([facility]     Diabetes ([facility]     Dialysis patient ([facility]     Dialysis patient ([facility]     M-W-F; LEFT AVF; PERM CATH RIGHT CHEST    Gastrointestinal hemorrhage associated with peptic ulcer     Gastrointestinal hemorrhage associated with peptic ulcer 10/5/2016    Gout     Hypercholesterolemia     Hypertension     Primary hyperparathyroidism ([facility] 2010    s/p parathyroid adenoma excision    Secondary hyperparathyroidism (of renal origin)     Sleep apnea     C PAP    Status post nephrectomy     right    Thyroid disease        Current Outpatient Medications   Medication Sig Dispense Refill    losartan (COZAAR) 100 mg tablet TAKE 1 TABLET BY MOUTH ONCE DAILY 90 Tab 0    atorvastatin (LIPITOR) 40 mg tablet TAKE 1/2 TABLET BY MOUTH DAILY 90 Tab 1    metoprolol succinate (TOPROL-XL) 100 mg tablet Take 1 Tab by mouth daily. (Patient taking differently: Take 100 mg by mouth two (2) times a day.) 30 Tab 6    RENVELA 800 mg tab tab Take 800 mg by mouth three (3) times daily. Patient reports medication is taken five times daily with meals and snacks  11    FABBY-DARLIN 0.8 mg tab tablet Take 0.8 mg by mouth daily. 11    OXYGEN-AIR DELIVERY SYSTEMS Take 3 L by inhalation continuous.  budesonide-formoterol (SYMBICORT) 160-4.5 mcg/actuation HFA inhaler Take 2 Puffs by inhalation two (2) times a day.            Social History     Tobacco Use   Smoking Status Never Smoker   Smokeless Tobacco Never Used       Allergies   Allergen Reactions    Ace Inhibitors Cough    Aspirin Other (comments)     Hallucinations    Hydralazine Other (comments)     Hallucinations, dizziness and numbness in legs     Sensipar [Cinacalcet] Unknown (comments)       Patient Labs were reviewed: yes      Patient Past Records were reviewed:  yes        Objective:     Vitals:    09/04/19 1328   BP: 116/55   Pulse: 79   Resp: 15   Temp: 98.4 °F (36.9 °C)   SpO2: 93%   Weight: 132 lb (59.9 kg)   Height: 4' 8\" (1.422 m)     Body mass index is 29.59 kg/m². Exam:   Appearance: alert, well appearing,  oriented to person, place, and time, acyanotic, in no respiratory distress and well hydrated. HEENT:  NC/AT, pink conj, anicteric sclerae  Neck:  No cervical lymphadenopathy, no JVD, no thyromegaly, no carotid bruit  Heart:  RRR, grade 3/6 HSM at the aortic area  Lungs:  CTAB, no rhonchi, rales, or wheezes with good air exchange   Abdomen:  Non-tender, pos bowel sounds, no hepatosplenomegaly  Ext:  No C/C/E, no point tenderness on the left anterior leg or ankle    Skin: no rash  Neuro: no lateralizing signs, CNs II-XII intact  Shunt on the left arm    Last Point of Care HGB A1C  Hemoglobin A1c (POC)   Date Value Ref Range Status   09/04/2019 5.6 % Final      Assessment/ Plan:   Diagnoses and all orders for this visit:    1. Type 2 diabetes mellitus with hyperglycemia, without long-term current use of insulin (HCC)-controlled, continue to watch diet  -     AMB POC HEMOGLOBIN A1C    2. Hypothyroidism due to acquired atrophy of thyroid-will continue to monitor TSH; if above 10, will start thyroid replacement  -     TSH 3RD GENERATION; Future    3. ESRD (end stage renal disease) on dialysis ([address]    4. Nonrheumatic aortic valve stenosis-Cardio following    5. Essential hypertension-controlled, continue with present meds    6.  Frequent falls-currently on PT; NPH ruled out with MRI/Neuro consult;obtain recent eye exam as well to make sure there is no papilledema    7. Left leg pain-?fibular fracture/facilitated her getting ortho appt shortly during this visit  -     REFERRAL TO ORTHOPEDICS    8. Pain of left heel-?calcaneal fracture s/p fall on initial Xray in April; patient is mostly wheelchair bound  -     REFERRAL TO ORTHOPEDICS    9. Overweight-discussed limiting abi to 8219-7325/day as she is unable to exercise    Follow-up and Dispositions    · Return in about 3 months (around 12/4/2019) for follow up. I have discussed the diagnosis with the patient and the intended plan as seen in the above orders. The patient has received an After-Visit Summary and questions were answered concerning future plans. Medication Side Effects and Warnings were discussed with patient: yes    Patient verbalized understanding of above instructions.     Richard Champion MD  Internal Medicine  [facility]

## 2019-09-04 NOTE — PROGRESS NOTES
Chief Complaint   Patient presents with    Follow Up Chronic Condition     3 month; patient not fasting     1. Have you been to the ER, urgent care clinic since your last visit? Hospitalized since your last visit? No    2. Have you seen or consulted any other health care providers outside of the [address] [place] [address] since your last visit? Include any pap smears or colon screening.  Dr. Arlette Lawler   Nephrology-Dr. Chad Perry    Seeing PT

## 2019-09-04 NOTE — PATIENT INSTRUCTIONS
Aortic Valve Stenosis: Care Instructions  Your Care Instructions    Having aortic valve stenosis means that the valve between your heart and the large blood vessel that carries blood to the body (aorta) has narrowed. That forces the heart to pump harder to get enough blood through the valve. As stenosis gets worse, the valve gets narrower. This can cause symptoms. Symptoms include chest pain, dizziness, fainting, or shortness of breath. If you have mild or moderate stenosis and don't have symptoms, you may not need treatment now. Your doctor will check your heart regularly. You may need treatment if the stenosis gets worse. With severe stenosis, you may need to have the valve replaced. Follow-up care is a key part of your treatment and safety. Be sure to make and go to all appointments, and call your doctor if you are having problems. It's also a good idea to know your test results and keep a list of the medicines you take. How can you care for yourself at home? · Be safe with medicines. Take your medicines exactly as prescribed. Call your doctor if you think you are having a problem with your medicine. You will get more details on the specific medicines your doctor prescribes. · Plan your meals so that you are eating heart-healthy foods. ? Eat a variety of foods daily. Fresh fruits and vegetables and whole grains are good choices. ? Limit your fat intake, especially saturated and trans fat. ? Limit salt (sodium). ? Increase fiber in your diet. ? Limit alcohol. · Be active. Ask your doctor what type and level of exercise is safe for you. Walking is a good choice. Your doctor may suggest that you join a cardiac rehabilitation program so that you can have help increasing your physical activity safely. · Do not smoke. Smoking can make aortic valve stenosis worse. If you need help quitting, talk to your doctor about stop-smoking programs and medicines.  These can increase your chances of quitting for good.  · Stay at a healthy weight. Lose weight if you need to. · Avoid colds and flu. Get a pneumococcal vaccine shot. If you have had one before, ask your doctor if you need another dose. Get a flu vaccine every year. · Take care of your teeth and gums. Get regular dental checkups. Good dental health is important because bacteria can spread from infected teeth and gums to the heart valves. When should you call for help? Call 911 anytime you think you may need emergency care. For example, call if:    · You passed out (lost consciousness).     · You have symptoms of a heart attack. These may include:  ? Chest pain or pressure, or a strange feeling in the chest.  ? Sweating. ? Shortness of breath. ? Nausea or vomiting. ? Pain, pressure, or a strange feeling in the back, neck, jaw, or upper belly or in one or both shoulders or arms. ? Lightheadedness or sudden weakness. ? A fast or irregular heartbeat.    After you call 911, the  may tell you to chew 1 adult-strength or 2 to 4 low-dose aspirin. Wait for an ambulance. Do not try to drive yourself.   Call your doctor now or seek immediate medical care if:    · You develop new symptoms of aortic valve stenosis, such as chest pain, dizziness, or shortness of breath.     · You have new or increased shortness of breath.     · You are dizzy or lightheaded, or you feel like you may faint.     · You have sudden weight gain, such as more than 2 to 3 pounds in a day or 5 pounds in a week. (Your doctor may suggest a different range of weight gain.)     · You have increased swelling in your legs, ankles, or feet.    Watch closely for changes in your health, and be sure to contact your doctor if:    · You have trouble making healthy lifestyle changes.     · You want more information about healthy lifestyle changes. Where can you learn more? Go to http://mihai-zach.info/.   Enter L441 in the search box to learn more about \"Aortic Valve Stenosis: Care Instructions. \"  Current as of: July 22, 2018  Content Version: 12.1  © 0899-0110 Health[employer]. Care instructions adapted under license by [employer] (which disclaims liability or warranty for this information). If you have questions about a medical condition or this instruction, always ask your healthcare professional. [place] [address] any warranty or liability for your use of this information.

## 2019-09-04 NOTE — Clinical Note
pls get last eye exam from [facility]

## 2019-09-05 ENCOUNTER — HOSPITAL ENCOUNTER (OUTPATIENT)
Dept: PHYSICAL THERAPY | Age: 74
Discharge: HOME | End: 2019-09-05
Payer: MEDICARE

## 2019-09-05 LAB — TSH SERPL DL<=0.005 MIU/L-ACNC: 5.88 UIU/ML (ref 0.45–4.5)

## 2019-09-05 PROCEDURE — 97110 THERAPEUTIC EXERCISES: CPT

## 2019-09-05 NOTE — PROGRESS NOTES
PHYSICAL THERAPY - DAILY TREATMENT NOTE    Patient Name: Tomer Anderson        Date: 2019  : 1945   YES Patient  Verified  Visit #:     Insurance: Payor: Roque Saunders / Plan: [address] [place] [address] HMO / Product Type: Managed Care Medicare /      In time: 1:00 Out time: 1:44   Total Treatment Time: 44     Medicare/BCBS [place] Time Tracking (below)   Total Timed Codes (min):  44 1:1 Treatment Time:  40     TREATMENT AREA =   Gait instability [R26.81]    SUBJECTIVE    Pain Level (on 0 to 10 scale):  5  / 10   Medication Changes/New allergies or changes in medical history, any new surgeries or procedures?    no    If yes, update Summary List   Subjective Functional Status/Changes:  []  No changes reported     \"It hurts in my back today. \"  States \"sometimes\" the exercises help to decrease back pain. OBJECTIVE    44 (40) min Therapeutic Exercise:  [x]  See flow sheet   Rationale:      increase ROM, increase strength, improve coordination, improve balance and increase proprioception to improve the patients ability to perform ADL's, gait, and functional mobility with increased safety/independence and decreased pain. min Patient Education:  YES  Reviewed HEP   []  Progressed/Changed HEP based on: Added LTR, supine isometric hip flexion, and seated trunk flexion     Other Objective/Functional Measures:    Patient transferred from [facility] to [facility] with min A; transferred from [facility] to [facility] with CGA. Transferred from [facility] to [facility] with CGA. Transferred from sit to supine with mod I. Patient performed 10 reps of seated trunk flexion and reported no pain in back upon completion of exercise (had rated pain at 5/10 prior to exercise). Patient denied walking around clinic today and requested to be wheeled in wc from [facility] to [facility] table. Continues to perform bridge with decreased range of motion.        Post Treatment Pain Level (on 0 to 10) scale:   3  / 10 ASSESSMENT    Assessment/Changes in Function:     Patient required extra time with transfers today. []  See Progress Note/Recertification   Patient will continue to benefit from skilled PT services to modify and progress therapeutic interventions, address functional mobility deficits, address ROM deficits, address strength deficits, analyze and address soft tissue restrictions, analyze and cue movement patterns, analyze and modify body mechanics/ergonomics, assess and modify postural abnormalities, address imbalance/dizziness and instruct in home and community integration to attain remaining goals. Progress toward goals / Updated goals: · Short Term Goals: To be accomplished in  4  weeks:  1. Patient will score > 14/28 on the Tinetti, indicating increased safety with gait. 2.  Patient will be compliant with home exercise program. Progressed HEP  3. Patient will demonstrate ability to perform 10 reps of supine bridge with good body mechanics to indicate increased core stability.  Performed bridge with decreased ROM     PLAN    [x]  Upgrade activities as tolerated YES Continue plan of care   []  Discharge due to :    []  Other:      Therapist: Alexei Zapata PT    Date: 9/5/2019 Time: 7:55 AM     Future Appointments   Date Time Provider Marla Pollard   9/5/2019  1:00 PM Myles Ansari [facility]   9/10/2019  1:00 PM Myles Ansari [facility]   9/12/2019  1:00 PM Myles Ansari [facility]   9/17/2019  1:00 PM Myles Ansari [facility]   9/19/2019  1:00 PM Myles Ansari [facility]   9/24/2019  1:00 PM Corey Shelton PT [facility]   9/26/2019  1:00 PM Myles Ansari [facility]   12/11/2019  1:15 [place] [id number] [facility]   6/3/2020  1:00 [place] [id number] [facility]

## 2019-09-10 ENCOUNTER — HOSPITAL ENCOUNTER (OUTPATIENT)
Dept: PHYSICAL THERAPY | Age: 74
Discharge: HOME | End: 2019-09-10
Payer: MEDICARE

## 2019-09-10 PROCEDURE — 97110 THERAPEUTIC EXERCISES: CPT

## 2019-09-10 PROCEDURE — 97116 GAIT TRAINING THERAPY: CPT

## 2019-09-10 NOTE — PROGRESS NOTES
PHYSICAL THERAPY - DAILY TREATMENT NOTE    Patient Name: Blessing Null        Date: 9/10/2019  : 1945   YES Patient  Verified  Visit #:     Insurance: Payor: Megan Ray / Plan: [address] [place] [address] HMO / Product Type: Managed Care Medicare /      In time: 1:02 Out time: 1:48   Total Treatment Time: 46     Medicare/BCBS [place] Time Tracking (below)   Total Timed Codes (min):  46 1:1 Treatment Time:  46     TREATMENT AREA =  Gait instability [R26.81]    SUBJECTIVE    Pain Level (on 0 to 10 scale):  5  / 10 (B LE's)   Medication Changes/New allergies or changes in medical history, any new surgeries or procedures?    no    If yes, update Summary List   Subjective Functional Status/Changes:  []  No changes reported     Patient's  reports patient can negotiate steps and get in/out of car without too much difficulty but he reports she still has a lot of difficulty and decreased speed. Patient reports exercises help to decrease her back pain. Patient reports she has a friend that wants to give her a walker. Patient's spouse reports they have 2 walkers at home. OBJECTIVE    30 min Therapeutic Exercise:  [x]  See flow sheet   Rationale:      increase ROM, increase strength, improve coordination, improve balance and increase proprioception to improve the patients ability to perform ADL's, gait and functional mobility with decreased pain and increased safety. 16 min Gait Training: Gait with FWW 30' with supervision   Rationale:    Improve safety and independence with household ambulation   min Patient Education:  YES  Reviewed HEP   []  Progressed/Changed HEP based on:   Cont HEP; recommended use of FWW at home to patient and spouse. Both verbalized agreement. Other Objective/Functional Measures:    Therapist added single knee to chest stretch. Patient required min A for positioning with right LE during single knee to chest stretch.   10 Meter Walk Test (with FWW): 1'42\"    Patient required mod A and verbal cues for supine <> sit transfers today. Post Treatment Pain Level (on 0 to 10) scale:   4 / 10     ASSESSMENT    Assessment/Changes in Function:     Patient's  observed gait with FWW for the 10 Meter Walk Test and stated \"That is much faster than what she is doing at home. \"  Patient demonstrates poor carryover with supine <> sit transfers. []  See Progress Note/Recertification   Patient will continue to benefit from skilled PT services to modify and progress therapeutic interventions, address functional mobility deficits, address ROM deficits, address strength deficits, analyze and address soft tissue restrictions, analyze and cue movement patterns, analyze and modify body mechanics/ergonomics, assess and modify postural abnormalities, address imbalance/dizziness and instruct in home and community integration to attain remaining goals. Progress toward goals / Updated goals: · Short Term Goals: To be accomplished in  4  weeks:  1.  Patient will score > 14/28 on the Tinetti, indicating increased safety with gait. 2.  Patient will be compliant with home exercise program.   3.  Patient will demonstrate ability to perform 10 reps of supine bridge with good body mechanics to indicate increased core stability.       PLAN    [x]  Upgrade activities as tolerated YES Continue plan of care   []  Discharge due to :    []  Other:      Therapist: Tomas Duque PT    Date: 9/10/2019 Time: 8:48 AM     Future Appointments   Date Time Provider Marla Pollard   9/10/2019  1:00 PM Cain Duran, [facility]   9/12/2019  1:00 PM Cain Screen, [facility]   9/17/2019  1:00 PM Stevole Screen, [facility]   9/19/2019  1:00 PM Karle Screen, [facility]   9/24/2019  1:00 PM Tate Ac [facility]   9/26/2019  1:00 PM Cain Duran, [facility]   12/11/2019  1:15 PM Breanna Ortega MD [facility]   6/3/2020  1:00 PM Breanna Ortega MD [id number] [facility] [address]

## 2019-09-12 ENCOUNTER — HOSPITAL ENCOUNTER (OUTPATIENT)
Dept: PHYSICAL THERAPY | Age: 74
Discharge: HOME | End: 2019-09-12
Payer: MEDICARE

## 2019-09-12 PROCEDURE — 97110 THERAPEUTIC EXERCISES: CPT

## 2019-09-12 NOTE — PROGRESS NOTES
[address]  [facility] PHYSICAL THERAPY  [address] [facility] [address], [address] - Phone: [phone]  Fax: [phone] [address] PHYSICAL THERAPY          Patient Name: Tomer Anderson : 1945   Treatment/Medical Diagnosis: Gait instability [R26.81]   Onset Date: ~ 3 months ago    Referral Source: Cheri Davis MD [facility]): 8/15/2019   Prior Hospitalization: See Medical History Provider #: [id number]   Prior Level of Function: Ambulating without assistive device; no back or LE pain   Comorbidities: s/p thyroidectomy, hypercholesterolemia, gout, sleep apnea, s/p partial right nephrectomy, HTN   Medications: Verified on Patient Summary List   Visits from [facility]: 8 Missed Visits: 0     Goal/Measure of Progress Goal Met? 1. Patient will score > 14/28 on the Tinetti, indicating increased safety with gait. Status at last Eval:  Current Status:  yes   2. Patient will be compliant with home exercise program.   Status at last Eval: NA Current Status: Compliant with HEP yes   3. Patient will demonstrate ability to perform 10 reps of supine bridge with good body mechanics to indicate increased core stability. Status at last Eval: Poor bridge Current Status: Fair bridge progressing     Key Functional Changes/Progress: Patient is progressing with physical therapy. Her Tinetti score improved from  to , indicating decreased fall risk. It should be noted that patient has begun ambulating with a rollator due to difficulty and decreased safety when using a SPC. Her Five Times Sit to Stand test improved from 28\" to 22\" (performed on 18\" chair with B UE assist), indicating decreased fall risk. Form/force closure is (+) on the right/negative on the left (initially positive B). She is now able to perform supine <> sit transfers with modified independence. B LE is slowly progressing (see below). Strength (1-5)  Hip Left Right   Flexion 4 3   Extension 3+ 3+   Abduction 3+ 3+   Adduction 4 4   ER 3 3+   IR 3 3   Knee Left Right   Extension 5 5   Flexion 4 4-       Problem List: pain affecting function, decrease ROM, decrease strength, impaired gait/ balance, decrease ADL/ functional abilitiies, decrease activity tolerance, decrease flexibility/ joint mobility and decrease transfer abilities   Treatment Plan may include any combination of the following: Therapeutic exercise, Therapeutic activities, Neuromuscular re-education, Physical agent/modality, Gait/balance training, Manual therapy and Patient education  Patient Goal(s) has been updated and includes:      Goals for this certification period include and are to be achieved in   4  weeks:  1. Patient will increase FOTO Functional Status score to 50/100 to indicate decreased functional limitations. 2.  Patient will score > 21/28 on the Tinetti indicating decreased fall risk. 3.  Patient will be independent with home exercise program for self management of symptoms. 4.  Patient will demonstrate ability to perform stair negotiation with 1 HR and supervision to increase safety and independence in her home. Frequency / Duration:   Patient to be seen   2   times per week for   4    weeks:    Assessments/Recommendations: Continue skilled PT to progress B LE strengthening, balance, and core stability exercises to increase safety and independence with ADL's, gait, and functional mobility. If you have any questions/comments please contact us directly at [phone]. Thank you for allowing us to assist in the care of your patient. Therapist Signature: Fritz Chamberlain PT Date: 3/73/7233   Certification Period:  Reporting Period: 8/15/2019 - 11/14/2019  8/15/2019 - 9/12/2019 Time: 2:21 PM   NOTE TO PHYSICIAN:  PLEASE COMPLETE THE ORDERS BELOW AND FAX TO   [facility]: [phone].   If you are unable to process this request in 24 hours please contact our office: [phone].    ___ I have read the above report and request that my patient continue as recommended.   ___ I have read the above report and request that my patient continue therapy with the following changes/special instructions:_________________________________________________________   ___ I have read the above report and request that my patient be discharged from therapy.      Physician Signature:        Date:       Time:

## 2019-09-12 NOTE — PROGRESS NOTES
PHYSICAL THERAPY - DAILY TREATMENT NOTE    Patient Name: Evaristo De Santiago        Date: 2019  : 1945   YES Patient  Verified  Visit #:     Insurance: Payor: Rojelio Damian / Plan: [address] [place] [address] HMO / Product Type: Managed Care Medicare /      In time: 1:00 Out time: 1:35   Total Treatment Time: 35     Medicare/BCBS [place] Time Tracking (below)   Total Timed Codes (min):  35 1:1 Treatment Time:  35     TREATMENT AREA =  Gait instability [R26.81]    SUBJECTIVE    Pain Level (on 0 to 10 scale):  7  / 10 (right LE)   Medication Changes/New allergies or changes in medical history, any new surgeries or procedures?    no    If yes, update Summary List   Subjective Functional Status/Changes:  []  No changes reported     \"My right leg hurts today. Sometimes I can walk faster than other times. \"  Reports she has been getting in and out of bed on her own recently. OBJECTIVE    35 min Therapeutic Exercise:  [x]  See flow sheet   Rationale:      increase ROM, increase strength, improve coordination, improve balance and increase proprioception to improve the patients ability to perform ADL's, gait, and functional mobility with increased safety/independence and decreased pain. min Patient Education:  YES  Reviewed HEP   []  Progressed/Changed HEP based on:   Cont HEP     Other Objective/Functional Measures:    Patient presents to therapy today with rollator. Patient reports decreased pain to 4/10 after 2 sets of 10 reps of seated trunk flexion.                                        Strength (1-5)  Hip Left Right   Flexion 4 3   Extension 3+ 3+   Abduction 3+ 3+   Adduction 4 4   ER 3 3+   IR 3 3   Knee Left Right   Extension 5 5   Flexion 4 4-     Five Times Sit to Stand: 22\"  Tinetti:   Form/Force closure: left negative/right positive  Fair bridge       Post Treatment Pain Level (on 0 to 10) scale:   4  / 10     ASSESSMENT    Assessment/Changes in Function:     See PN to MD     []  See Progress Note/Recertification   Patient will continue to benefit from skilled PT services to modify and progress therapeutic interventions, address functional mobility deficits, address ROM deficits, address strength deficits, analyze and address soft tissue restrictions, analyze and cue movement patterns, analyze and modify body mechanics/ergonomics, assess and modify postural abnormalities, address imbalance/dizziness and instruct in home and community integration to attain remaining goals.      Progress toward goals / Updated goals:    See PN to MD     PLAN    [x]  Upgrade activities as tolerated YES Continue plan of care   []  Discharge due to :    []  Other:      Therapist: Clay Haynes PT    Date: 9/12/2019 Time: 12:58 PM     Future Appointments   Date Time Provider Marla Pollard   9/12/2019  1:00 PM Jacinta Devlin, [address] [place] [address]   9/17/2019  1:00 PM Jacinta Devlin, [facility]   9/19/2019  1:00 PM Jacinta Devlin [facility]   9/24/2019  1:00 PM Garth Mujica [facility]   9/26/2019  1:00 PM Jacinta Dvelin [facility]   12/11/2019  1:15 PM Keiko Dukes MD [address] [place] [address]   6/3/2020  1:00 PM Keiko Dukes MD [address] [place] [address]

## 2019-09-17 ENCOUNTER — HOSPITAL ENCOUNTER (OUTPATIENT)
Dept: PHYSICAL THERAPY | Age: 74
Discharge: HOME | End: 2019-09-17
Payer: MEDICARE

## 2019-09-17 PROCEDURE — 97110 THERAPEUTIC EXERCISES: CPT

## 2019-09-17 NOTE — PROGRESS NOTES
PHYSICAL THERAPY - DAILY TREATMENT NOTE    Patient Name: Link Narinder        Date: 2019  : 1945   YES Patient  Verified  Visit #:     Insurance: Payor: Ramona Rubio / Plan: [address] [place] [address] HMO / Product Type: Managed Care Medicare /      In time: 1:01 Out time: 1:30   Total Treatment Time: 29     Medicare/BCBS [place] Time Tracking (below)   Total Timed Codes (min):  29 1:1 Treatment Time:  29     TREATMENT AREA =  Gait instability [R26.81]    SUBJECTIVE    Pain Level (on 0 to 10 scale):  3  / 10   Medication Changes/New allergies or changes in medical history, any new surgeries or procedures?    no    If yes, update Summary List   Subjective Functional Status/Changes:  []  No changes reported     \"I am getting in and out of bed better. My  only has to help me when I am sore. \"   Reports compliance with HEP. OBJECTIVE    29 min Therapeutic Exercise:  [x]  See flow sheet   Rationale:      increase ROM, increase strength, improve coordination, improve balance and increase proprioception to improve the patients ability to perform ADL's, gait, and functional mobility with increased safety/independence and decreased pain. min Patient Education:  YES  Reviewed HEP   []  Progressed/Changed HEP based on:   Cont HEP     Other Objective/Functional Measures:    Patient ambulated with rollator from lobby to treatment area (parallel bars) in ~ 7 minutes. Patient ambulated with rollator from treatment area of clinic to lobby in 3'56\". Therapist progressed exercises per flowsheet, including adding standing HR/TR, step ups with 4\" step and B UE support, and advancing tap ups. Therapist increased reps with standing hip abd/ext.   Patient performed advancing tap ups on 6\" step only (did not included tapping second step)     Post Treatment Pain Level (on 0 to 10) scale:   3  / 10     ASSESSMENT    Assessment/Changes in Function:     Patient appeared to fatigue with exercises but denies fatigue when asked if she needs a rest.      []  See Progress Note/Recertification   Patient will continue to benefit from skilled PT services to modify and progress therapeutic interventions, address functional mobility deficits, address ROM deficits, address strength deficits, analyze and address soft tissue restrictions, analyze and cue movement patterns, analyze and modify body mechanics/ergonomics, assess and modify postural abnormalities, address imbalance/dizziness and instruct in home and community integration to attain remaining goals. Progress toward goals / Updated goals:    · Goals for this certification period include and are to be achieved in   4  weeks:  1.  Patient will increase FOTO Functional Status score to 50/100 to indicate decreased functional limitations. 2.  Patient will score > 21/28 on the Tinetti indicating decreased fall risk. Cont B LE strengthening; ambulating with rollator. 3.  Patient will be independent with home exercise program for self management of symptoms. Compliant with HEP  4.  Patient will demonstrate ability to perform stair negotiation with 1 HR and supervision to increase safety and independence in her home. Added step ups.      PLAN    [x]  Upgrade activities as tolerated YES Continue plan of care   []  Discharge due to :    []  Other:      Therapist: Jl Osei PT    Date: 9/17/2019 Time: 8:29 AM     Future Appointments   Date Time Provider Marla Pollard   9/17/2019  1:00 PM Jhon Dillon, [address] [place] [address]   9/19/2019  1:00 PM Jhon Dillon [facility]   9/23/2019  2:30 PM Priti Mcfarlane MD [place] [address]   9/24/2019  1:00 PM Nico Sanford PT [facility]   9/26/2019  1:00 PM Jhon Dillon [facility]   12/11/2019  1:15 PM Yumiko Cabrales MD [address] [place] [address]   6/3/2020  1:00 PM MD Allison Carias[address] [place] [address]

## 2019-09-19 ENCOUNTER — HOSPITAL ENCOUNTER (OUTPATIENT)
Dept: PHYSICAL THERAPY | Age: 74
Discharge: HOME | End: 2019-09-19
Payer: MEDICARE

## 2019-09-19 PROCEDURE — 97110 THERAPEUTIC EXERCISES: CPT

## 2019-09-19 NOTE — PROGRESS NOTES
PHYSICAL THERAPY - DAILY TREATMENT NOTE Patient Name: Layne Kerr        Date: 2019 : 1945   YES Patient  Verified Visit #:   10   of   16  Insurance: Payor:  [place]are / Plan: [address] [place] [address] HMO / Product Type: Managed Care Medicare / In time: 1:03 Out time: 1:42 Total Treatment Time: 44 Medicare/BCBS [place] Time Tracking (below) Total Timed Codes (min):  39 1:1 Treatment Time:  44 TREATMENT AREA =  Gait instability [R26.81] SUBJECTIVE Pain Level (on 0 to 10 scale):  3  / 10 Medication Changes/New allergies or changes in medical history, any new surgeries or procedures?    no    If yes, update Summary List  
Subjective Functional Status/Changes:  []  No changes reported Reports continued compliance with HEP. Patient's spouse reports patient walked around the house yesterday (safely) without assistive device. He states she did not realize she had forgotten her rollator. OBJECTIVE 39 min Therapeutic Exercise:  [x]  See flow sheet Rationale:      increase ROM, increase strength, improve coordination, improve balance and increase proprioception to improve the patients ability to perform ADL's, gait, and functional mobility with increased safety/independence and decreased pain. min Patient Education:  YES  Reviewed HEP []  Progressed/Changed HEP based on: Other Objective/Functional Measures: 
 
Therapist increased reps with step ups and standing heel raise/toe raise Therapist added [employer] Patient performed sit to stand transfers without UE assist. 
  
Post Treatment Pain Level (on 0 to 10) scale:   3  / 10 ASSESSMENT Assessment/Changes in Function:  
 
Patient continues to demonstrate decreased gait speed. []  See Progress Note/Recertification Patient will continue to benefit from skilled PT services to modify and progress therapeutic interventions, address functional mobility deficits, address ROM deficits, address strength deficits, analyze and address soft tissue restrictions, analyze and cue movement patterns, analyze and modify body mechanics/ergonomics, assess and modify postural abnormalities, address imbalance/dizziness and instruct in home and community integration to attain remaining goals. Progress toward goals / Updated goals: · Goals for this certification period include and are to be achieved in   4  weeks: 1.  Patient will increase FOTO Functional Status score to 50/100 to indicate decreased functional limitations. 2.  Patient will score > 21/28 on the Tinetti indicating decreased fall risk. Cont LE strengthening 3.  Patient will be independent with home exercise program for self management of symptoms. reports compliance with HEP 4.  Patient will demonstrate ability to perform stair negotiation with 1 HR and supervision to increase safety and independence in her home. Cont step ups PLAN [x]  Upgrade activities as tolerated YES Continue plan of care  
[]  Discharge due to :   
[]  Other:   
 
Therapist: Chau Myers PT Date: 9/19/2019 Time: 9:53 AM  
 
Future Appointments Date Time Provider Marla Pollard 9/19/2019  1:00 PM Ursual Douglas [facility]  
9/23/2019  2:30 PM Rafael Campbell MD [facility] SKINNY SCHED  
9/24/2019  1:00 PM Avila Callahan [facility]  
9/26/2019  1:00 PM Ursula Douglas [facility]  
12/11/2019  1:15 PM Lizzy Stroud MD [place] SKINNY SCHED  
6/3/2020  1:00 PM Lizzy Stroud MD [address] [facility] 8238

## 2019-09-23 ENCOUNTER — OFFICE VISIT (OUTPATIENT)
Dept: ORTHOPEDIC SURGERY | Facility: CLINIC | Age: 74
End: 2019-09-23

## 2019-09-23 VITALS
RESPIRATION RATE: 16 BRPM | TEMPERATURE: 97.7 F | BODY MASS INDEX: 29.59 KG/M2 | HEIGHT: 56 IN | SYSTOLIC BLOOD PRESSURE: 107 MMHG | DIASTOLIC BLOOD PRESSURE: 44 MMHG | HEART RATE: 85 BPM | OXYGEN SATURATION: 89 %

## 2019-09-23 DIAGNOSIS — M54.50 LUMBAR PAIN: ICD-10-CM

## 2019-09-23 DIAGNOSIS — S39.012A STRAIN OF LUMBAR REGION, INITIAL ENCOUNTER: Primary | ICD-10-CM

## 2019-09-23 DIAGNOSIS — Z91.81 RISK FOR FALLS: ICD-10-CM

## 2019-09-23 RX ORDER — BETAMETHASONE SODIUM PHOSPHATE AND BETAMETHASONE ACETATE 3; 3 MG/ML; MG/ML
6 INJECTION, SUSPENSION INTRA-ARTICULAR; INTRALESIONAL; INTRAMUSCULAR; SOFT TISSUE ONCE
Qty: 0.5 ML | Refills: 0
Start: 2019-09-23 | End: 2019-09-23

## 2019-09-23 NOTE — PROGRESS NOTES
Patient: Layne Kerr                MRN: [id number]       SSN: [id number] YOB: 1945        AGE: 74 y.o. SEX: female PCP: Dmitriy Veloz MD 
09/23/19 CC: BACK PAIN 
  
HISTORY:  Layne Kerr is a 74 y.o. female who is seen for back pain. She feels unsteady & has been falling recently. She tripped and fell forward landing on her face at [address] on 2/24/19. She was seen at [facility] ED on 2/24/19 where left ankle and left knee x rays were basically. She no longer feels pain in her left knee or ankle. She also fell recently from her computer chair in March. She swiveled around in the chair and fell onto the ground. She has been experiencing back pain since her second fall. She has been using a wheelchair since her recent falling episodes. Pain Assessment  9/23/2019 Location of Pain Foot;Leg Location Modifiers Left Severity of Pain 4 Quality of Pain Burning; Etienne Kappa Quality of Pain Comment pt states \"feel like something is sticking me\" Duration of Pain Other (Comment) Duration of Pain Comment pt states \"Sometimes I hurt all day & sometimes the pain will come & go\" Frequency of Pain Intermittent Aggravating Factors Walking;Stairs Limiting Behavior Yes Relieving Factors Other (Comment) Relieving Factors Comment icy/hot creme & aspecreme Result of Injury Yes Work-Related Injury No  
Type of Injury Fall Occupation, etc:  Ms. Spencer Ricci previously worked as a  for the [employer]. She retired in 2002. She lives in [address] [place] [address] with her . She has an adult daughter in [place] and an adult son in [place]. She also has 8 grandchildren and 5 great grandchildren. She is active with her [employer]. Ms. Spencer Ricci weighs 132 lbs and is 4'8\" tall. Lab Results Component Value Date/Time  Hemoglobin A1c 5.2 01/31/2017 11:56 AM  
 Hemoglobin A1c (POC) 5.6 09/04/2019 01:45 PM  
 Hemoglobin A1c, External 5.5 09/19/2016 Weight Metrics 9/23/2019 9/4/2019 6/3/2019 4/23/2019 5/10/2018 1/9/2018 10/31/2017 Weight - 132 lb 133 lb 133 lb 137 lb 132 lb 132 lb BMI 29.59 kg/m2 29.59 kg/m2 29.82 kg/m2 29.82 kg/m2 30.71 kg/m2 29.59 kg/m2 29.59 kg/m2 Patient Active Problem List  
Diagnosis Code  Gout M10.9  Hyperlipidemia E78.5  Tubular adenoma of colon D12.6  Anemia in chronic kidney disease N18.9, D63.1  MELANIE on CPAP G47.33, Z99.89  
 CHF (congestive heart failure) ([facility]) I50.9  Type 2 diabetes mellitus with hyperglycemia, with long-term current use of insulin ([facility]) E11.65, Z79.4  Advance directive discussed with patient Z70.80  
 Essential hypertension I10  
 Hypothyroidism due to acquired atrophy of thyroid E03.4  ESRD (end stage renal disease) on dialysis ([facility]) N18.6, Z99.2  Type 2 diabetes mellitus with nephropathy ([facility]) E11.21  
 Chronic obstructive pulmonary disease ([facility]) J44.9 REVIEW OF SYSTEMS: All Below are Negative except: See HPI Constitutional: negative for fever, chills, and weight loss. Cardiovascular: negative for chest pain, claudication, leg swelling, SOB, LEBLANC Gastrointestinal: Negative for pain, N/V/C/D, Blood in stool or urine, dysuria, hematuria, incontinence, pelvic pain. Musculoskeletal: See HPI Neurological: Negative for dizziness and weakness. Negative for headaches, Visual changes, confusion, seizures Phychiatric/Behavioral: Negative for depression, memory loss, substance abuse. Extremities: Negative for hair changes, rash, or skin lesion changes. Hematologic: Negative for bleeding problems, bruising, pallor or swollen lymph nodes Peripheral Vascular: No calf pain, no circulation deficits. Social History Socioeconomic History  Marital status:  Spouse name: Not on file  Number of children: Not on file  Years of education: Not on file  Highest education level: Not on file Occupational History  Not on file Social Needs  Financial resource strain: Not on file  Food insecurity:  
  Worry: Not on file Inability: Not on file  Transportation needs:  
  Medical: Not on file Non-medical: Not on file Tobacco Use  Smoking status: Never Smoker  Smokeless tobacco: Never Used Substance and Sexual Activity  Alcohol use: No  
 Drug use: No  
 Sexual activity: Yes  
  Partners: Male Lifestyle  Physical activity:  
  Days per week: Not on file Minutes per session: Not on file  Stress: Not on file Relationships  Social connections:  
  Talks on phone: Not on file Gets together: Not on file Attends Restorationism service: Not on file Active member of club or organization: Not on file Attends meetings of clubs or organizations: Not on file Relationship status: Not on file  Intimate partner violence:  
  Fear of current or ex partner: Not on file Emotionally abused: Not on file Physically abused: Not on file Forced sexual activity: Not on file Other Topics Concern  Not on file Social History Narrative  Not on file Allergies Allergen Reactions  Ace Inhibitors Cough  Aspirin Other (comments) Hallucinations  Hydralazine Other (comments) Hallucinations, dizziness and numbness in legs  Sensipar [Cinacalcet] Unknown (comments) Current Outpatient Medications Medication Sig  
 losartan (COZAAR) 100 mg tablet TAKE 1 TABLET BY MOUTH ONCE DAILY  atorvastatin (LIPITOR) 40 mg tablet TAKE 1/2 TABLET BY MOUTH DAILY  metoprolol succinate (TOPROL-XL) 100 mg tablet Take 1 Tab by mouth daily. (Patient taking differently: Take 100 mg by mouth two (2) times a day.)  RENVELA 800 mg tab tab Take 800 mg by mouth three (3) times daily. Patient reports medication is taken five times daily with meals and snacks  FABBY-DARLIN 0.8 mg tab tablet Take 0.8 mg by mouth daily.  OXYGEN-AIR DELIVERY SYSTEMS Take 3 L by inhalation continuous.  budesonide-formoterol (SYMBICORT) 160-4.5 mcg/actuation HFA inhaler Take 2 Puffs by inhalation two (2) times a day. No current facility-administered medications for this visit. PHYSICAL EXAMINATION: 
Visit Vitals /44 (BP 1 Location: Right arm, BP Patient Position: Sitting) Pulse 85 Temp 97.7 °F (36.5 °C) (Oral) Resp 16 Ht 4' 8\" (1.422 m) SpO2 (!) 89% BMI 29.59 kg/m² ORTHO EXAMINATION: 
Examination Lumbar Thoracic Skin Intact Intact Tenderness + right paralumbar - Tightness + right paralumbar - Lordosis Normal N/A Kyphosis N/A Normal  
Scoliosis - - Flexion Fingertips to mid shin N/A Extension 10 N/A Knee reflexes Normal N/A Ankle reflexes Normal N/A Straight leg raise - N/A Calf tenderness - N/A Moves extremely slowly getting out of wheelchair Able to stand and turn with difficulty TIME OUT: 
Chart reviewed for the following: 
 Althea Brown MD, have reviewed the History, Physical and updated the Allergic reactions for Clementeen Crigler TIME OUT performed immediately prior to start of procedure: 
Althea Brown MD, have performed the following reviews on Clementeen Crigler prior to the start of the procedure:         
* Patient was identified by name and date of birth * Agreement on procedure being performed was verified * Risks and Benefits explained to the patient * Procedure site verified and marked as necessary * Patient was positioned for comfort * Consent was obtained Time: 3:10 PM  
 
Date of procedure: 9/23/2019 Procedure performed by:  Karen Garibay MD 
Ms. Farrar tolerated the procedure well with no complications. RADIOGRAPHS: 
XR LEFT ANKLE 2/24/19 [facility] ED 
IMPRESSION Limited study due to osteoporosis. Soft tissue swelling present. Questionable calcaneal fracture. -I have independently reviewed these images during this office visit. -Dr. Mayte Phillips XR LEFT KNEE 2/24/19 [facility] ED 
IMPRESSION Fibular head fracture not completely excluded. Clinical correlation for point tenderness? 
-I have independently reviewed these images during this office visit. -Dr. Mayte Phillips IMPRESSION:   
  ICD-10-CM ICD-9-CM 1. Strain of lumbar region, initial encounter S39.012A 847.2 betamethasone (CELESTONE SOLUSPAN) 6 mg/mL injection BETAMETHASONE ACETATE & SODIUM PHOSPHATE INJECTION 3 MG EA. INJECT TRIGGER POINT, 1 OR 2 REFERRAL TO SPINE SURGERY 2. Risk for falls Z91.81 V15.88 3. Lumbar pain M54.5 724.2 betamethasone (CELESTONE SOLUSPAN) 6 mg/mL injection BETAMETHASONE ACETATE & SODIUM PHOSPHATE INJECTION 3 MG EA. INJECT TRIGGER POINT, 1 OR 2 REFERRAL TO SPINE SURGERY  
 
PLAN:  After discussing treatment options, patient's right paralumbar region was injected with 4 cc Marcaine and 1/2 cc Celestone. Continue PT. There is no need for surgery at this time. She will follow up at the spine center.   
 
Scribed by Sina Rodriguez (Mildred Frey) as dictated by Dania De La Torre MD

## 2019-09-23 NOTE — PROGRESS NOTES
1. Have you been to the ER, urgent care clinic since your last visit? Hospitalized since your last visit? Yes, [facility] ED 2. Have you seen or consulted any other health care providers outside of the [address] [place] [address] since your last visit? Include any pap smears or colon screening.  NO

## 2019-09-24 ENCOUNTER — HOSPITAL ENCOUNTER (OUTPATIENT)
Dept: PHYSICAL THERAPY | Age: 74
Discharge: HOME | End: 2019-09-24
Payer: MEDICARE

## 2019-09-24 PROCEDURE — 97110 THERAPEUTIC EXERCISES: CPT

## 2019-09-24 NOTE — PROGRESS NOTES
PHYSICAL THERAPY - DAILY TREATMENT NOTE Patient Name: Sheyla Sanders        Date: 2019 : 1945   YES Patient  Verified Visit #:     Insurance: Payor: Anne Brunner / Plan: [address] [place] [address] HMO / Product Type: Managed Care Medicare / In time: 1:58 Out time: 1:36 Total Treatment Time: 45 Medicare/BCBS Time Tracking (below) Total Timed Codes (min):  38 1:1 Treatment Time:  45 TREATMENT AREA = Gait instability [R26.81] SUBJECTIVE Pain Level (on 0 to 10 scale):  4  / 10 Medication Changes/New allergies or changes in medical history, any new surgeries or procedures? NO    If yes, update Summary List  
Subjective Functional Status/Changes:  []  No changes reported \"I got a shot yesterday in my back about 1:30 and initially my pain got better but it came back. \"  
 
  
 
OBJECTIVE Therapeutic Procedures: 
Min Procedure Specifics + Rationale  
n/a [x]  Patient Education (performed throughout session) [x] Review HEP [] Progressed/Changed HEP based on:  
[] proper performance and advancement of Therex/TA [] reduction in pain level   
[] increased functional capacity [] change in directional preference 38 [x] Therapeutic Exercise [x]  See Flowsheet Rationale: increase ROM and increase strength to improve the patients ability to participate in ADL's Other Objective/Functional Measures: PT abbreviated session d/t cortisone injection in L/S yesterday. Pt verbalized her understanding. Pt required bilateral UE with sit<>stands See flow sheet for more details. Progressed therex per flow sheet. Post Treatment Pain Level (on 0 to 10) scale:   4  / 10 ASSESSMENT Assessment/Changes in Function: Pt continues to ambulate with slow gait however reports decreased reliance on AD with household ambulation. []  See Plan of Care 
[]  See Progress Note/ Recertification 
[]  See Discharge Summary Patient will continue to benefit from skilled PT services to modify and progress therapeutic interventions, address functional mobility deficits, address ROM deficits, address strength deficits, analyze and address soft tissue restrictions, analyze and cue movement patterns, analyze and modify body mechanics/ergonomics, assess and modify postural abnormalities, address imbalance/dizziness and instruct in home and community integration  to attain remaining goals Progress toward goals / Updated goals: · Goals for this certification period include and are to be achieved in   4  weeks: 1.  Patient will increase FOTO Functional Status score to 50/100 to indicate decreased functional limitations. 2.  Patient will score > 21/28 on the Tinetti indicating decreased fall risk. Cont LE strengthening 3.  Patient will be independent with home exercise program for self management of symptoms.  reports compliance with HEP 4.  Patient will demonstrate ability to perform stair negotiation with 1 HR and supervision to increase safety and independence in her home. Cont step ups PLAN [x]  Upgrade activities as tolerated 
[x]  Update interventions per flow sheet YES Continue plan of care  
[]  Discharge due to :   
[]  Other:   
 
Therapist: Jamison Botello DPT Date: 9/24/2019 Time: 7:28 AM  
 
Future Appointments Date Time Provider Marla Pollard 9/24/2019  1:00 PM Ryne Askew, PT [facility]  
9/26/2019  1:00 PM Bong Levin PT [facility]  
12/11/2019  1:15 PM MD CEDRIC Ramirez  
6/3/2020  1:00 PM Lazaro Swartz MD [address] [facility] 8415

## 2019-09-26 ENCOUNTER — HOSPITAL ENCOUNTER (OUTPATIENT)
Dept: PHYSICAL THERAPY | Age: 74
Discharge: HOME | End: 2019-09-26
Payer: MEDICARE

## 2019-09-26 PROCEDURE — 97110 THERAPEUTIC EXERCISES: CPT

## 2019-09-26 NOTE — PROGRESS NOTES
PHYSICAL THERAPY - DAILY TREATMENT NOTE Patient Name: Robert Turner        Date: 2019 : 1945   YES Patient  Verified Visit #:   15   of   16  Insurance: Payor: Renetta Rock / Plan: [address] [place] [address] HMO / Product Type: Managed Care Medicare / In time: 12:58 Out time: 1:37 Total Treatment Time: 44 Medicare/BCBS [place] Time Tracking (below) Total Timed Codes (min):  39 1:1 Treatment Time:   
  
 
TREATMENT AREA =  Gait instability [R26.81] SUBJECTIVE Pain Level (on 0 to 10 scale):  4  / 10  B LE's Medication Changes/New allergies or changes in medical history, any new surgeries or procedures?    no    If yes, update Summary List  
Subjective Functional Status/Changes:  []  No changes reported \"I got an injection in my back on Monday. I don't have any pain in my back today. My legs hurt though. They are doing more tests on my legs next week. I guess they are doing more x rays. \" OBJECTIVE 39 min Therapeutic Exercise:  [x]  See flow sheet Rationale:      increase ROM, increase strength, improve coordination, improve balance and increase proprioception to improve the patients ability to perform ADL's, gait, and functional mobility with increased safety/independence and decreased pain. min Patient Education:  YES  Reviewed HEP []  Progressed/Changed HEP based on:   Cont HEP Other Objective/Functional Measures: 
 
Patient ambulated from lobby to treatment area of clinic ([facility]) with rollator in 6'54\". Post Treatment Pain Level (on 0 to 10) scale:    10 ASSESSMENT Assessment/Changes in Function:  
 
Patient continues to require extra time for all ambulation and exercises. []  See Progress Note/Recertification Patient will continue to benefit from skilled PT services to modify and progress therapeutic interventions, address functional mobility deficits, address ROM deficits, address strength deficits, analyze and address soft tissue restrictions, analyze and cue movement patterns, analyze and modify body mechanics/ergonomics, assess and modify postural abnormalities, address imbalance/dizziness and instruct in home and community integration to attain remaining goals. Progress toward goals / Updated goals: · Goals for this certification period include and are to be achieved in   4  weeks: 1.  Patient will increase FOTO Functional Status score to 50/100 to indicate decreased functional limitations. 2.  Patient will score > 21/28 on the Tinetti indicating decreased fall risk. Cont LE strengthening 3.  Patient will be independent with home exercise program for self management of symptoms.  reports compliance with HEP 4.  Patient will demonstrate ability to perform stair negotiation with 1 HR and supervision to increase safety and independence in her home. Performed step ups. PLAN [x]  Upgrade activities as tolerated YES Continue plan of care  
[]  Discharge due to :   
[]  Other:   
 
Therapist: Justo Blanton, PT Date: 9/26/2019 Time: 1:10 PM  
 
Future Appointments Date Time Provider Marla Pollard 12/11/2019  1:15 PM MD CEDRIC Kasper  
6/3/2020  1:00 PM Saurabh James MD [address] [facility] 2251

## 2019-10-01 ENCOUNTER — HOSPITAL ENCOUNTER (OUTPATIENT)
Dept: PHYSICAL THERAPY | Age: 74
Discharge: HOME | End: 2019-10-01
Payer: MEDICARE

## 2019-10-01 PROCEDURE — 97110 THERAPEUTIC EXERCISES: CPT

## 2019-10-01 NOTE — PROGRESS NOTES
PHYSICAL THERAPY - DAILY TREATMENT NOTE Patient Name: Tony Asencio        Date: 10/1/2019 : 1945   YES Patient  Verified Visit #:   15   of   16  Insurance: Payor: Prem Cassi / Plan: [address] [place] [address] HMO / Product Type: Managed Care Medicare / In time: 1:04 Out time: 1:49 Total Treatment Time: 45 Medicare/BCBS [place] Time Tracking (below) Total Timed Codes (min):  45 1:1 Treatment Time:  32 TREATMENT AREA =  Gait instability [R26.81] SUBJECTIVE Pain Level (on 0 to 10 scale):  6-7  / 10 Medication Changes/New allergies or changes in medical history, any new surgeries or procedures?    no    If yes, update Summary List  
Subjective Functional Status/Changes:  []  No changes reported Reports her legs have had increased pain for the past 2-3 days and credits the increased pain to negotiating 23 steps at home. OBJECTIVE 
 
45 (27) min Therapeutic Exercise:  [x]  See flow sheet Rationale:      increase ROM, increase strength, improve coordination, improve balance and increase proprioception to improve the patients ability to perform ADL's, gait, and functional mobility with increased safety/independence and decreased pain. min Patient Education:  YES  Reviewed HEP []  Progressed/Changed HEP based on:   Cont HEP Other Objective/Functional Measures: 
 
Patient presented to clinic with rollator today but was unable to walk from lobby to treatment area due to c/o's B LE pain. Required WC after walking ~ 15'. Therapist held standing exercises due to increased labored movements today. Post Treatment Pain Level (on 0 to 10) scale:   3   10 ASSESSMENT Assessment/Changes in Function:  
 
Patient with increased difficulty with B LE's movements today. []  See Progress Note/Recertification Patient will continue to benefit from skilled PT services to modify and progress therapeutic interventions, address functional mobility deficits, address ROM deficits, address strength deficits, analyze and address soft tissue restrictions, analyze and cue movement patterns, analyze and modify body mechanics/ergonomics, assess and modify postural abnormalities, address imbalance/dizziness and instruct in home and community integration to attain remaining goals. Progress toward goals / Updated goals: · Goals for this certification period include and are to be achieved in   4  weeks: 1.  Patient will increase FOTO Functional Status score to 50/100 to indicate decreased functional limitations. 2.  Patient will score > 21/28 on the Tinetti indicating decreased fall risk. 3.  Patient will be independent with home exercise program for self management of symptoms.  reports compliance with HEP. 4.  Patient will demonstrate ability to perform stair negotiation with 1 HR and supervision to increase safety and independence in her home. PLAN [x]  Upgrade activities as tolerated YES Continue plan of care  
[]  Discharge due to :   
[]  Other:   
 
Therapist: Sherri Sosa PT Date: 10/1/2019 Time: 11:15 AM  
 
Future Appointments Date Time Provider Marla Pollard 10/1/2019  1:00 PM Sarah Alcala PT [facility]  
10/8/2019  1:00 PM Jane Che PT [facility]  
10/10/2019  1:00 PM Sarah Alcala [facility]  
12/11/2019  1:15 PM MD CEDRIC Stover  
6/3/2020  1:00 PM Reggie Tavarez MD [address] [facility] 2284

## 2019-10-08 ENCOUNTER — HOSPITAL ENCOUNTER (OUTPATIENT)
Dept: PHYSICAL THERAPY | Age: 74
Discharge: HOME | End: 2019-10-08
Payer: MEDICARE

## 2019-10-08 PROCEDURE — 97110 THERAPEUTIC EXERCISES: CPT

## 2019-10-08 NOTE — PROGRESS NOTES
PHYSICAL THERAPY - DAILY TREATMENT NOTE Patient Name: Cleopatra Jackson        Date: 10/8/2019 : 1945   YES Patient  Verified Visit #:     Insurance: Payor: Sheila Luna / Plan: [address] [place] [address] HMO / Product Type: Managed Care Medicare / In time: 1:10 Out time: 1:34 Total Treatment Time: 24 Medicare/BCBS [place] Time Tracking (below) Total Timed Codes (min):  24 1:1 Treatment Time:  24 TREATMENT AREA =  Gait instability [R26.81] SUBJECTIVE Pain Level (on 0 to 10 scale):  2  / 10 Medication Changes/New allergies or changes in medical history, any new surgeries or procedures? NO    If yes, update Summary List  
Subjective Functional Status/Changes:  []  No changes reported Pt reports 2/10 pain all over. OBJECTIVE 24 min Therapeutic Exercise:  [x]  See flow sheet Rationale:      increase ROM, increase strength, improve coordination, improve balance and increase proprioception to improve the patients ability to perform ADLs/IADLs, functional mobility and gait safely and independently without increased pain/symptoms During TE min Patient Education:  YES  Reviewed HEP []  Progressed/Changed HEP based on:   Cont HEP Other Objective/Functional Measures: 
 
Exercises per flowsheet Post Treatment Pain Level (on 0 to 10) scale:   1  / 10 ASSESSMENT Assessment/Changes in Function: Tolerated exercises without c/o increased pain Requires increased time to initiate movements 
  
[]  See Progress Note/Recertification Patient will continue to benefit from skilled PT services to modify and progress therapeutic interventions, address functional mobility deficits, address ROM deficits, address strength deficits, analyze and address soft tissue restrictions, analyze and cue movement patterns, analyze and modify body mechanics/ergonomics, assess and modify postural abnormalities, address imbalance/dizziness and instruct in home and community integration to attain remaining goals. Progress toward goals / Updated goals: 
 
Progressing toward goals: · Goals for this certification period include and are to be achieved in   4  weeks: 1.  Patient will increase FOTO Functional Status score to 50/100 to indicate decreased functional limitations. 2.  Patient will score > 21/28 on the Tinetti indicating decreased fall risk. 3.  Patient will be independent with home exercise program for self management of symptoms.  reports compliance with HEP. 4.  Patient will demonstrate ability to perform stair negotiation with 1 HR and supervision to increase safety and independence in her home. PLAN [x]  Upgrade activities as tolerated YES Continue plan of care  
[]  Discharge due to :   
[]  Other:   
 
Therapist: Debbi Vargas PT Date: 10/8/2019 Time: 1:05 PM  
 
Future Appointments Date Time Provider Marla Pollard 10/10/2019  1:00 PM Kathie Cazares PT [facility]  
10/17/2019 10:30 AM Kathie Cazares PT [facility]  
10/24/2019 12:00 PM [facility]  
12/11/2019  1:15 PM MD CEDRIC Ruiz  
6/3/2020  1:00 PM Genoveva Lee MD [address] [facility] 6136

## 2019-10-10 ENCOUNTER — HOSPITAL ENCOUNTER (OUTPATIENT)
Dept: PHYSICAL THERAPY | Age: 74
Discharge: HOME | End: 2019-10-10
Payer: MEDICARE

## 2019-10-10 PROCEDURE — 97110 THERAPEUTIC EXERCISES: CPT

## 2019-10-10 NOTE — PROGRESS NOTES
[address] [facility] PHYSICAL THERAPY 
[address] [facility] [address], [place], [address] - Phone: [phone]  Fax: [phone] CONTINUED PLAN OF CARE FOR PHYSICAL THERAPY Patient Name: Viktoria Harding : 1945 Treatment/Medical Diagnosis: Gait instability [R26.81] Onset Date: 2019 Referral Source: Allie Steele MD [facility]): 8/15/2019 Prior Hospitalization: See Medical History Provider #: [id number] Prior Level of Function: Ambulating without assistive device; no back or LE pain Comorbidities: s/p thyroidectomy, hypercholesterolemia, gout, sleep apnea, s/p partial right nephrectomy, HTN Medications: Verified on Patient Summary List  
Visits from [facility]: 15 Missed Visits: 0 Goal/Measure of Progress Goal Met? 1. Patient will increase FOTO Functional Status score to 50/100 to indicate decreased functional limitations. Status at last Eval: 34/100 Current Status: NT n/a 2. Patient will score > 21/28 on the Tinetti indicating decreased fall risk. Status at last Eval:  (with rollator) Current Status: 15/28 (with rollator) no 3. Patient will be independent with home exercise program for self management of symptoms. Status at last Eval: Compliant with HEP Current Status: Compliant with HEP progressing 4. Patient will demonstrate ability to perform stair negotiation with 1 HR and supervision to increase safety and independence in her home. Status at last Eval: Requires min A with stair negotiation for balance Current Status: Not assessed n/a Key Functional Changes/Progress: Therapy has been progressed from primarily supine core stability exercises to standing B LE and core strengthening exercises. Patient requires extra time to complete all exercises and ambulation due to c/o's right LE pain. Her B LE strength is essentially unchanged since last assessment (see below).   Patient (+) form/force closure B, indicating decreased core stability. External core support from therapist eliminates right LE pain with movement. Strength (1-5) Hip Left Right Flexion 4- 3 Extension 4- 3 Abduction 3+ 3+ Adduction 3+ 3+ ER 3 3+ IR 3 3 Knee Left Right Extension 5 5 Flexion 4 4-  
  
10 Meter Walk Test: 3'31\" Tinetti: 15/28 Five Times Sit to Stand Test: 24\" (from 18\" chair with B UE assist) 
  
Scour test (-) on right LE Patient denies increased pain with PROM right LE adduction, extension, or flexion 
  
Form/force closure: (+) B Problem List: pain affecting function, decrease ROM, decrease strength, impaired gait/ balance, decrease ADL/ functional abilitiies, decrease activity tolerance, decrease flexibility/ joint mobility and decrease transfer abilities Treatment Plan may include any combination of the following: Therapeutic exercise, Therapeutic activities, Neuromuscular re-education, Physical agent/modality, Gait/balance training, Manual therapy and Patient education Patient Goal(s) has been updated and includes:    
? Goals for this certification period include and are to be achieved in   3-4  weeks: 
Continue with above goals. Frequency / Duration:   Patient to be seen   2   times per week for   3-4    weeks: 
 
Assessments/Recommendations: Continue with skilled physical therapy returning focus to core stabilization exercises. If you have any questions/comments please contact us directly at [phone]. Thank you for allowing us to assist in the care of your patient. Therapist Signature: Chiquita Lopez PT Date: 10/10/2019 Certification Period: 
Reporting Period: 8/15/2019 - 11/14/2019 9/12/2019 - 10/10/2019 Time: 2:16 PM  
NOTE TO PHYSICIAN:  PLEASE COMPLETE THE ORDERS BELOW AND FAX TO [facility] Physical Therapy: [phone]. If you are unable to process this request in 24 hours please contact our office: [phone]. ___ I have read the above report and request that my patient continue as recommended.  
___ I have read the above report and request that my patient continue therapy with the following changes/special instructions:_________________________________________________________  
___ I have read the above report and request that my patient be discharged from therapy.   
 
Physician Signature:        Date:       Time:

## 2019-10-10 NOTE — PROGRESS NOTES
PHYSICAL THERAPY - DAILY TREATMENT NOTE Patient Name: Karthik Tamayo        Date: 10/10/2019 : 1945   YES Patient  Verified Visit #:   15   of   16  Insurance: Payor: Elisa Bautista / Plan: [address] [place] [address] HMO / Product Type: Managed Care Medicare / In time: 1:15 (late) Out time: 2:04 Total Treatment Time: 52 Medicare/BCBS [place] Time Tracking (below) Total Timed Codes (min):  49 1:1 Treatment Time:  52 TREATMENT AREA =  Gait instability [R26.81] SUBJECTIVE Pain Level (on 0 to 10 scale):  2  / 10 Medication Changes/New allergies or changes in medical history, any new surgeries or procedures?    no    If yes, update Summary List  
Subjective Functional Status/Changes:  []  No changes reported Patient continues to report pain in right LE but states PT helps to decrease the pain for \"about a day. \" Reports she put a bed downstairs and has been sleeping downstairs so she doesn't have to climb the stairs. OBJECTIVE 49 min Therapeutic Exercise:  [x]  See flow sheet Rationale:      increase ROM, increase strength, improve coordination, improve balance and increase proprioception to improve the patients ability to perform ADL's, gait, and functional mobility with increased safety and decreased pain. min Patient Education:  YES  Reviewed HEP []  Progressed/Changed HEP based on:   Cont bridges, clams, TA draw, H/L hip abd/add, and seated hip flexion 2x/day Other Objective/Functional Measures: 
 
      
                               Strength (1-5) Hip Left Right Flexion 4- 3 Extension 4- 3 Abduction 3+ 3+ Adduction 3+ 3+ ER 3 3+ IR 3 3 Knee Left Right Extension 5 5 Flexion 4 4-  
 
10 Meter Walk Test: 3'31\" Tinetti: 15/28 Five Times Sit to Stand Test: 24\" (from 18\" chair with B UE assist) Scour test (-) on right LE Patient denies increased pain with PROM right LE adduction, extension, or flexion Form/force closure: (+) B Post Treatment Pain Level (on 0 to 10) scale:   1  / 10 ASSESSMENT Assessment/Changes in Function:  
 
See PN to MD 
  
[]  See Progress Note/Recertification Patient will continue to benefit from skilled PT services to modify and progress therapeutic interventions, address functional mobility deficits, address ROM deficits, address strength deficits, analyze and address soft tissue restrictions, analyze and cue movement patterns, analyze and modify body mechanics/ergonomics, assess and modify postural abnormalities, address imbalance/dizziness and instruct in home and community integration to attain remaining goals. Progress toward goals / Updated goals: 
 
See PN to MD  
 
PLAN [x]  Upgrade activities as tolerated YES Continue plan of care  
[]  Discharge due to :   
[]  Other:   
 
Therapist: Zeus Gonzalez PT Date: 10/10/2019 Time: 12:58 PM  
 
Future Appointments Date Time Provider Marla Pollard 10/10/2019  1:00 PM Xi Willoughby PT [facility]  
10/17/2019 10:30 AM Xi Willoughby PT [facility]  
10/24/2019 12:00 PM [facility] BX [facility]  
12/11/2019  1:15 PM MD CEDRIC De Los Santos  
6/3/2020  1:00 PM Leif Mix MD [address] [facility] 8938

## 2019-10-16 ENCOUNTER — HOSPITAL ENCOUNTER (OUTPATIENT)
Dept: PHYSICAL THERAPY | Age: 74
End: 2019-10-16
Payer: MEDICARE

## 2019-10-17 ENCOUNTER — HOSPITAL ENCOUNTER (OUTPATIENT)
Dept: PHYSICAL THERAPY | Age: 74
Discharge: HOME | End: 2019-10-17
Payer: MEDICARE

## 2019-10-17 PROCEDURE — 97110 THERAPEUTIC EXERCISES: CPT

## 2019-10-17 NOTE — PROGRESS NOTES
PHYSICAL THERAPY - DAILY TREATMENT NOTE Patient Name: Juan Francisco Greene        Date: 10/17/2019 : 1945   YES Patient  Verified Visit #:     Insurance: Payor: Dorian Yap / Plan: [address] [place] [address] HMO / Product Type: Managed Care Medicare / In time: 10:31 Out time: 11:08 Total Treatment Time: 37 Medicare/BCBS [place] Time Tracking (below) Total Timed Codes (min):  37 1:1 Treatment Time:  37 TREATMENT AREA =  Gait instability [R26.81] SUBJECTIVE Pain Level (on 0 to 10 scale):  2  / 10 Medication Changes/New allergies or changes in medical history, any new surgeries or procedures?    no    If yes, update Summary List  
Subjective Functional Status/Changes:  []  No changes reported \"I still have pain in my legs when I walk. \" OBJECTIVE 37 min Therapeutic Exercise:  [x]  See flow sheet Rationale:      increase ROM, increase strength, improve coordination, improve balance and increase proprioception to improve the patients ability to perform ADL's, gait, and functional mobility with increased safety/indepnedence and decreased pain. min Patient Education:  YES  Reviewed HEP []  Progressed/Changed HEP based on:   Cont current HEP Other Objective/Functional Measures: 
 
Emphasized core stability exercises today. Added bent knee fall out and Swiss ball # 1 and 2 Post Treatment Pain Level (on 0 to 10) scale:   1  / 10 ASSESSMENT Assessment/Changes in Function:  
 
Patient requires extra time and verbal cues for correct technique with bed mobility/scooting. Able to perform exercises without c/o's increased pain. []  See Progress Note/Recertification Patient will continue to benefit from skilled PT services to modify and progress therapeutic interventions, address functional mobility deficits, address ROM deficits, address strength deficits, analyze and address soft tissue restrictions, analyze and cue movement patterns, analyze and modify body mechanics/ergonomics, assess and modify postural abnormalities, address imbalance/dizziness and instruct in home and community integration to attain remaining goals. Progress toward goals / Updated goals: · Goals for this certification period include and are to be achieved in   4  weeks: 1.  Patient will increase FOTO Functional Status score to 50/100 to indicate decreased functional limitations. 2.  Patient will score > 21/28 on the Tinetti indicating decreased fall risk. 3.  Patient will be independent with home exercise program for self management of symptoms.  reports compliance with HEP 4.  Patient will demonstrate ability to perform stair negotiation with 1 HR and supervision to increase safety and independence in her home. PLAN [x]  Upgrade activities as tolerated YES Continue plan of care  
[]  Discharge due to :   
[]  Other:   
 
Therapist: Wale Ortega PT Date: 10/17/2019 Time: 8:05 AM  
 
Future Appointments Date Time Provider Marla Pollard 10/17/2019 10:30 AM Mariam Lange [facility]  
10/22/2019 11:00 AM Mariam Lange PT [facility]  
10/24/2019 12:00 PM [facility] STEVIE STEREO BX RM 1 [facility]  
10/25/2019  2:30 PM Mariam Lange PT [facility]  
10/29/2019  9:00 AM Mariam Lange [facility]  
10/31/2019  9:00 AM Mariam Lange [facility]  
12/11/2019  1:15 PM Luis Carlos Jimenez MD [facility]  
6/3/2020  1:00 PM Luis Carlos Jimenez MD [address] [facility] 7133

## 2019-10-22 ENCOUNTER — HOSPITAL ENCOUNTER (OUTPATIENT)
Dept: PHYSICAL THERAPY | Age: 74
Discharge: HOME | End: 2019-10-22
Payer: MEDICARE

## 2019-10-22 PROCEDURE — 97110 THERAPEUTIC EXERCISES: CPT

## 2019-10-22 NOTE — PROGRESS NOTES
PHYSICAL THERAPY - DAILY TREATMENT NOTE Patient Name: Sanjana Molina        Date: 10/22/2019 : 1945   YES Patient  Verified Visit #:     Insurance: Payor: Mau Nunez / Plan: [address] [place] [address] HMO / Product Type: Managed Care Medicare / In time: 11:03 Out time: 11:53 Total Treatment Time: 50 Medicare/BCBS [place] Time Tracking (below) Total Timed Codes (min):  50 1:1 Treatment Time:  50 TREATMENT AREA =  Gait instability [R26.81] SUBJECTIVE Pain Level (on 0 to 10 scale):  3  / 10 Medication Changes/New allergies or changes in medical history, any new surgeries or procedures?    no    If yes, update Summary List  
Subjective Functional Status/Changes:  []  No changes reported \"I fell while going down the steps on Saturday. My foot slipped and I fell to my left side. If the walker hadn't been there, I would have hit my head on the concrete. I hink my hand slipped on the cold pole. \" Reports she hurt her left hand (bruising noted over left palm) and has pain/difficulty moving left middle finger. States she did not seek medical treatment. States her left middle finger is improving (decreased pain and swelling since Saturday). She denies any other injury. Patient's  states \"She is looking good at home\" after therapy session. Reports he sees improvement in her mobility. OBJECTIVE 50 min Therapeutic Exercise:  [x]  See flow sheet Rationale:      increase ROM, increase strength, improve coordination, improve balance and increase proprioception to improve the patients ability to perform ADL's, gait,and functional mobility with increased safety and independence. min Patient Education:  YES  Reviewed HEP []  Progressed/Changed HEP based on: Added supine heel slides to HEP Other Objective/Functional Measures: 
 
Patient with bruising noted over storey aspect of left hand (digits 2-4). Patient requires min A for rolling to right side. Therapist added supine heel slides with TA draw. Patient reports pain and difficulty with H/L TA march (uses B UE's to lift LE's) Post Treatment Pain Level (on 0 to 10) scale:   1  / 10 ASSESSMENT Assessment/Changes in Function:  
 
Patient continues to require extra time for transfers, bed mobility, and exercises. []  See Progress Note/Recertification Patient will continue to benefit from skilled PT services to modify and progress therapeutic interventions, address functional mobility deficits, address ROM deficits, address strength deficits, analyze and address soft tissue restrictions, analyze and cue movement patterns, analyze and modify body mechanics/ergonomics, assess and modify postural abnormalities, address imbalance/dizziness and instruct in home and community integration to attain remaining goals. Progress toward goals / Updated goals: · Goals for this certification period include and are to be achieved in   4  weeks: 1.  Patient will increase FOTO Functional Status score to 50/100 to indicate decreased functional limitations. 2.  Patient will score > 21/28 on the Tinetti indicating decreased fall risk. Cont core stability exercises. 3.  Patient will be independent with home exercise program for self management of symptoms.  Reports compliance with HEP 4.  Patient will demonstrate ability to perform stair negotiation with 1 HR and supervision to increase safety and independence in her home. PLAN [x]  Upgrade activities as tolerated YES Continue plan of care  
[]  Discharge due to :   
[]  Other:   
 
Therapist: Christina Reyna PT Date: 10/22/2019 Time: 11:08 AM  
 
Future Appointments Date Time Provider Marla Pollard 10/24/2019 12:00 PM [address] [place] [address] STEVIE STEREO BX RM [address]  
10/25/2019  2:30 PM Frank Levy, PT [address]  
10/29/2019  9:00 AM Frank Levy, PT [address]  
 10/31/2019  9:00 AM Cindy Peterson [facility]  
12/11/2019  1:15 PM MD CEDRIC Dhaliwal  
6/3/2020  1:00 PM David Victoria MD [address] [facility] 8837

## 2019-10-24 ENCOUNTER — HOSPITAL ENCOUNTER (OUTPATIENT)
Dept: MAMMOGRAPHY | Age: 74
Discharge: HOME | End: 2019-10-24
Attending: INTERNAL MEDICINE
Payer: MEDICARE

## 2019-10-24 DIAGNOSIS — Z12.31 VISIT FOR SCREENING MAMMOGRAM: ICD-10-CM

## 2019-10-24 PROCEDURE — 77063 BREAST TOMOSYNTHESIS BI: CPT

## 2019-10-25 ENCOUNTER — HOSPITAL ENCOUNTER (OUTPATIENT)
Dept: PHYSICAL THERAPY | Age: 74
Discharge: HOME | End: 2019-10-25
Payer: MEDICARE

## 2019-10-25 PROCEDURE — 97110 THERAPEUTIC EXERCISES: CPT

## 2019-10-25 NOTE — PROGRESS NOTES
PHYSICAL THERAPY - DAILY TREATMENT NOTE Patient Name: Siva Rivera        Date: 10/25/2019 : 1945   YES Patient  Verified Visit #:     Insurance: Payor: Philippe Jameel / Plan: [address] [place] [address] HMO / Product Type: Managed Care Medicare / In time: 2:25 Out time: 3:09 Total Treatment Time: 44 Medicare/BCBS [place] Time Tracking (below) Total Timed Codes (min):  44 1:1 Treatment Time:  44 TREATMENT AREA =  Gait instability [R26.81] SUBJECTIVE Pain Level (on 0 to 10 scale):  3  / 10 Medication Changes/New allergies or changes in medical history, any new surgeries or procedures?    no    If yes, update Summary List  
Subjective Functional Status/Changes:  []  No changes reported \"I did my bed exercises last night. \" Reports she now has a bed downstairs that she sleeps in sometimes so she doesn't have to climb the steps. States she goes upstairs to sleep on Friday's because she stays upstairs for the weekend. Patient reports \"I am getting some kind of test on my spine next month. \" OBJECTIVE 39 min Therapeutic Exercise:  [x]  See flow sheet Rationale:      increase ROM, increase strength, improve coordination, improve balance and increase proprioception to improve the patients ability to perform ADL's, gait, and functional mobility with improved joint mechanics and decreased pain. 5 min Therapeutic Activity: Stair negotiation Rationale:      increase ROM, increase strength, improve coordination, improve balance and increase proprioception to improve the patients ability to negotiate steps with increased safety and independence. min Patient Education:  YES  Reviewed HEP []  Progressed/Changed HEP based on:   Cont HEP Other Objective/Functional Measures: 
 
Patient required 4'03\" to negotiate 3 steps with B HR, a step to gait pattern, and min A. Resumed standing exercises today. Post Treatment Pain Level (on 0 to 10) scale:   0  / 10 ASSESSMENT Assessment/Changes in Function:  
 
Patient requires extra time for all tasks. Patient demonstrated greater difficulty performing mat exercises today. When patient was asked about this, she replied, \"I am tired today. \" 
  
[]  See Progress Note/Recertification Patient will continue to benefit from skilled PT services to modify and progress therapeutic interventions, address functional mobility deficits, address ROM deficits, address strength deficits, analyze and address soft tissue restrictions, analyze and cue movement patterns, analyze and modify body mechanics/ergonomics, assess and modify postural abnormalities, address imbalance/dizziness and instruct in home and community integration to attain remaining goals. Progress toward goals / Updated goals: · Goals for this certification period include and are to be achieved in   4  weeks: 1.  Patient will increase FOTO Functional Status score to 50/100 to indicate decreased functional limitations. 2.  Patient will score > 21/28 on the Tinetti indicating decreased fall risk. Cont LE strengthening exercises. 3.  Patient will be independent with home exercise program for self management of symptoms.  Reports compliance with HEP 4.  Patient will demonstrate ability to perform stair negotiation with 1 HR and supervision to increase safety and independence in her home. Negotiated 3 steps with min A   
 
PLAN [x]  Upgrade activities as tolerated YES Continue plan of care  
[]  Discharge due to :   
[]  Other:   
 
Therapist: Chiquita Lopez PT Date: 10/25/2019 Time: 2:51 PM  
 
Future Appointments Date Time Provider Marla Pollard 10/29/2019  9:00 AM Yoel Hope PT [facility]  
10/31/2019  9:00 AM Yoel Hope PT [facility]  
12/2/2019  1:00 PM Juan Yoder  [id number]   
12/11/2019  1:15 PM Marsha Morton MD [id number] [facility]  
 6/3/2020  1:00 PM Burt Hyde MD [address] [facility] 7[address]

## 2019-10-28 ENCOUNTER — OFFICE VISIT (OUTPATIENT)
Dept: FAMILY MEDICINE CLINIC | Age: 74
End: 2019-10-28

## 2019-10-28 VITALS
HEART RATE: 74 BPM | WEIGHT: 130 LBS | RESPIRATION RATE: 12 BRPM | TEMPERATURE: 98.5 F | BODY MASS INDEX: 29.25 KG/M2 | DIASTOLIC BLOOD PRESSURE: 57 MMHG | HEIGHT: 56 IN | SYSTOLIC BLOOD PRESSURE: 141 MMHG

## 2019-10-28 DIAGNOSIS — R22.1 MASS PRESENT ON ONE SIDE OF NECK: Primary | ICD-10-CM

## 2019-10-28 NOTE — PROGRESS NOTES
[address]               [phone]      Desire Mckinley is a 74 y.o. female and presents with Other (knot on right throat/Chin  outside- x 2 days- painful )       Assessment/Plan:    Diagnoses and all orders for this visit:    1. Mass present on one side of neck  -     US THYROID/PARATHYROID/SOFT TISS; Future    Large mass palpable on the right side of her neck, could be a cyst however could be a lymph node  We will send for an ultrasound to determine the source of the mass  Informed her if she starts having more masses form to please call me and I will expedite getting the ultrasound performed    This note was dictated using Dragon dictation system. Every effort was made to ensure accuracy, although occasional spelling errors and word substitutions are expected due to dictation system limitations. Thank you for your understanding and patient. Follow-up and Dispositions    · Return if symptoms worsen or fail to improve. Subjective:    Lump on neck  Onset 1-2 days ago  Location: right neck, under chine at jaw ling  Characterisits: large firm a little tender but does not hurt, never had before  Treatments none  Nothing makes it better, nothing makes it worse  Had dialysis today  Negatives: no light headedness or dizziness, no drainaage    ROS:As stated in HPI, otherwise all others negative. ROS    The problem list was updated as a part of today's visit.   Patient Active Problem List   Diagnosis Code    Gout M10.9    Hyperlipidemia E78.5    Tubular adenoma of colon D12.6    Anemia in chronic kidney disease N18.9, D63.1    MELANIE on CPAP G47.33, Z99.89    CHF (congestive heart failure) ([facility]) I50.9    Type 2 diabetes mellitus with hyperglycemia, with long-term current use of insulin ([facility]) E11.65, Z79.4    Advance directive discussed with patient Z70.80    Essential hypertension I10    Hypothyroidism due to acquired atrophy of thyroid E03.4    ESRD (end stage renal disease) on dialysis ([facility]) N18.6, Z99.2    Type 2 diabetes mellitus with nephropathy ([facility]) E11.21    Chronic obstructive pulmonary disease ([facility]) J44.9       The PSH,  were reviewed. SH:  Social History     Tobacco Use    Smoking status: Never Smoker    Smokeless tobacco: Never Used   Substance Use Topics    Alcohol use: No    Drug use: No       Medications/Allergies:  Current Outpatient Medications on File Prior to Visit   Medication Sig Dispense Refill    losartan (COZAAR) 100 mg tablet TAKE 1 TABLET BY MOUTH ONCE DAILY 90 Tab 0    atorvastatin (LIPITOR) 40 mg tablet TAKE 1/2 TABLET BY MOUTH DAILY 90 Tab 1    metoprolol succinate (TOPROL-XL) 100 mg tablet Take 1 Tab by mouth daily. (Patient taking differently: Take 100 mg by mouth two (2) times a day.) 30 Tab 6    FABBY-DARLIN 0.8 mg tab tablet Take 0.8 mg by mouth daily. 11    OXYGEN-AIR DELIVERY SYSTEMS Take 3 L by inhalation continuous.  budesonide-formoterol (SYMBICORT) 160-4.5 mcg/actuation HFA inhaler Take 2 Puffs by inhalation two (2) times a day.  RENVELA 800 mg tab tab Take 800 mg by mouth three (3) times daily. Patient reports medication is taken five times daily with meals and snacks  11     No current facility-administered medications on file prior to visit. Allergies   Allergen Reactions    Ace Inhibitors Cough    Aspirin Other (comments)     Hallucinations    Hydralazine Other (comments)     Hallucinations, dizziness and numbness in legs     Sensipar [Cinacalcet] Unknown (comments)       Objective:  Visit Vitals  /57   Pulse 74   Temp 98.5 °F (36.9 °C) (Oral)   Resp 12   Ht 4' 8\" (1.422 m)   Wt 130 lb (59 kg)   BMI 29.15 kg/m²    Body mass index is 29.15 kg/m². Physical assessment  Physical Exam   Constitutional: She is oriented to person, place, and time and well-developed, well-nourished, and in no distress.  Vital signs are normal.   HENT:   Head: Normocephalic and atraumatic. Neck:       Cardiovascular: Normal rate, regular rhythm and normal heart sounds. Pulmonary/Chest: Effort normal and breath sounds normal.   Musculoskeletal:   In wheelchair   Lymphadenopathy:        Head (right side): Submandibular adenopathy present. No submental, no tonsillar, no preauricular, no posterior auricular and no occipital adenopathy present. Head (left side): No submental, no submandibular, no tonsillar, no preauricular, no posterior auricular and no occipital adenopathy present. Right cervical: No superficial cervical, no deep cervical and no posterior cervical adenopathy present. Left cervical: No superficial cervical, no deep cervical and no posterior cervical adenopathy present. Right: No supraclavicular adenopathy present. Left: No supraclavicular adenopathy present. Neurological: She is alert and oriented to person, place, and time. Gait normal.   Skin: Skin is warm, dry and intact. Nursing note and vitals reviewed.         Labwork and Ancillary Studies:    CBC w/Diff  Lab Results   Component Value Date/Time    WBC 4.7 05/08/2019 12:00 AM    HGB 11.1 05/08/2019 12:00 AM    PLATELET 623 (L) 58/91/7251 12:00 AM         Basic Metabolic Profile  Lab Results   Component Value Date/Time    Sodium 136 05/08/2019 12:00 AM    Potassium 4.4 05/08/2019 12:00 AM    Chloride 95 (L) 05/08/2019 12:00 AM    CO2 24 05/08/2019 12:00 AM    Anion gap 10 07/18/2017 11:20 AM    Glucose 99 05/08/2019 12:00 AM    BUN 8 05/08/2019 12:00 AM    Creatinine 2.56 (H) 05/08/2019 12:00 AM    BUN/Creatinine ratio 3 (L) 05/08/2019 12:00 AM    GFR est AA 20 (L) 05/08/2019 12:00 AM    GFR est non-AA 18 (L) 05/08/2019 12:00 AM    Calcium 9.1 05/08/2019 12:00 AM        Cholesterol  Lab Results   Component Value Date/Time    Cholesterol, total 143 05/08/2019 12:00 AM    HDL Cholesterol 59 05/08/2019 12:00 AM    LDL, calculated 56 05/08/2019 12:00 AM    Triglyceride 142 05/08/2019 12:00 AM    CHOL/HDL Ratio 2.3 09/29/2010 02:20 PM       Health Maintenance:   Health Maintenance   Topic Date Due    Shingrix Vaccine Age 50> (1 of 2) 12/06/1993    EYE EXAM RETINAL OR DILATED  09/28/2017    GLAUCOMA SCREENING Q2Y  06/22/2019    HEMOGLOBIN A1C Q6M  03/04/2020    LIPID PANEL Q1  05/08/2020    MEDICARE YEARLY EXAM  06/03/2020    FOOT EXAM Q1  06/05/2020    COLONOSCOPY  08/28/2020    DTaP/Tdap/Td series (2 - Td) 12/01/2026    Bone Densitometry (Dexa) Screening  Completed    Influenza Age 5 to Adult  Completed    Pneumococcal 65+ years  Completed       I have discussed the diagnosis with the patient and the intended plan as seen in the above orders. The patient has received an After-Visit Summary and questions were answered concerning future plans. An After Visit Summary was printed and given to the patient. All diagnosis have been discussed with the patient and all of the patient's questions have been answered. Follow-up and Dispositions    · Return if symptoms worsen or fail to improve. Ronald [facility]JOVANNA-BC  [address] [facility]   [address] [facility] [place] [address]  [address]

## 2019-10-28 NOTE — PROGRESS NOTES
Chief Complaint   Patient presents with    Other     knot on right throat/Chin  outside- x 2 days- painful          1. Have you been to the ER, urgent care clinic since your last visit? Hospitalized since your last visit? No    2. Have you seen or consulted any other health care providers outside of the [address] [place] [address] since your last visit? Include any pap smears or colon screening.  Ortho

## 2019-10-29 ENCOUNTER — APPOINTMENT (OUTPATIENT)
Dept: PHYSICAL THERAPY | Age: 74
End: 2019-10-29
Payer: MEDICARE

## 2019-10-30 ENCOUNTER — HOSPITAL ENCOUNTER (OUTPATIENT)
Dept: ULTRASOUND IMAGING | Age: 74
Discharge: HOME | End: 2019-10-30
Attending: NURSE PRACTITIONER
Payer: MEDICARE

## 2019-10-30 DIAGNOSIS — R22.1 MASS PRESENT ON ONE SIDE OF NECK: ICD-10-CM

## 2019-10-30 PROCEDURE — 76882 US LMTD JT/FCL EVL NVASC XTR: CPT

## 2019-10-31 ENCOUNTER — APPOINTMENT (OUTPATIENT)
Dept: GENERAL RADIOLOGY | Age: 74
End: 2019-10-31
Attending: PHYSICIAN ASSISTANT
Payer: MEDICARE

## 2019-10-31 ENCOUNTER — HOSPITAL ENCOUNTER (EMERGENCY)
Age: 74
Discharge: HOME | End: 2019-10-31
Attending: EMERGENCY MEDICINE
Payer: MEDICARE

## 2019-10-31 ENCOUNTER — APPOINTMENT (OUTPATIENT)
Dept: CT IMAGING | Age: 74
End: 2019-10-31
Attending: PHYSICIAN ASSISTANT
Payer: MEDICARE

## 2019-10-31 ENCOUNTER — HOSPITAL ENCOUNTER (OUTPATIENT)
Dept: PHYSICAL THERAPY | Age: 74
End: 2019-10-31
Payer: MEDICARE

## 2019-10-31 VITALS
RESPIRATION RATE: 16 BRPM | TEMPERATURE: 98 F | OXYGEN SATURATION: 90 % | BODY MASS INDEX: 29.15 KG/M2 | WEIGHT: 130 LBS | SYSTOLIC BLOOD PRESSURE: 135 MMHG | DIASTOLIC BLOOD PRESSURE: 53 MMHG | HEART RATE: 102 BPM

## 2019-10-31 DIAGNOSIS — K11.5 SUBMANDIBULAR SIALOLITHIASIS: ICD-10-CM

## 2019-10-31 DIAGNOSIS — K11.21 ACUTE SIALOADENITIS: Primary | ICD-10-CM

## 2019-10-31 LAB
ALBUMIN SERPL-MCNC: 3.2 G/DL (ref 3.4–5)
ALBUMIN/GLOB SERPL: 0.8 {RATIO} (ref 0.8–1.7)
ALP SERPL-CCNC: 286 U/L (ref 45–117)
ALT SERPL-CCNC: 13 U/L (ref 13–56)
ANION GAP SERPL CALC-SCNC: 14 MMOL/L (ref 3–18)
AST SERPL-CCNC: 13 U/L (ref 10–38)
BASOPHILS # BLD: 0 K/UL (ref 0–0.1)
BASOPHILS NFR BLD: 0 % (ref 0–2)
BILIRUB SERPL-MCNC: 0.6 MG/DL (ref 0.2–1)
BUN SERPL-MCNC: 22 MG/DL (ref 7–18)
BUN/CREAT SERPL: 4 (ref 12–20)
CALCIUM SERPL-MCNC: 7.5 MG/DL (ref 8.5–10.1)
CHLORIDE SERPL-SCNC: 98 MMOL/L (ref 100–111)
CO2 SERPL-SCNC: 24 MMOL/L (ref 21–32)
CREAT SERPL-MCNC: 4.97 MG/DL (ref 0.6–1.3)
DIFFERENTIAL METHOD BLD: ABNORMAL
EOSINOPHIL # BLD: 0 K/UL (ref 0–0.4)
EOSINOPHIL NFR BLD: 0 % (ref 0–5)
ERYTHROCYTE [DISTWIDTH] IN BLOOD BY AUTOMATED COUNT: 18.4 % (ref 11.6–14.5)
GLOBULIN SER CALC-MCNC: 4.2 G/DL (ref 2–4)
GLUCOSE SERPL-MCNC: 68 MG/DL (ref 74–99)
HCT VFR BLD AUTO: 36.7 % (ref 35–45)
HGB BLD-MCNC: 11.7 G/DL (ref 12–16)
LYMPHOCYTES # BLD: 1 K/UL (ref 0.9–3.6)
LYMPHOCYTES NFR BLD: 9 % (ref 21–52)
MCH RBC QN AUTO: 27.9 PG (ref 24–34)
MCHC RBC AUTO-ENTMCNC: 31.9 G/DL (ref 31–37)
MCV RBC AUTO: 87.4 FL (ref 74–97)
MONOCYTES # BLD: 0.7 K/UL (ref 0.05–1.2)
MONOCYTES NFR BLD: 6 % (ref 3–10)
NEUTS SEG # BLD: 9.7 K/UL (ref 1.8–8)
NEUTS SEG NFR BLD: 85 % (ref 40–73)
PLATELET # BLD AUTO: 164 K/UL (ref 135–420)
PMV BLD AUTO: 9.9 FL (ref 9.2–11.8)
POTASSIUM SERPL-SCNC: 3.1 MMOL/L (ref 3.5–5.5)
PROT SERPL-MCNC: 7.4 G/DL (ref 6.4–8.2)
RBC # BLD AUTO: 4.2 M/UL (ref 4.2–5.3)
SODIUM SERPL-SCNC: 136 MMOL/L (ref 136–145)
WBC # BLD AUTO: 11.3 K/UL (ref 4.6–13.2)

## 2019-10-31 PROCEDURE — 74011250637 HC RX REV CODE- 250/637: Performed by: PHYSICIAN ASSISTANT

## 2019-10-31 PROCEDURE — 80053 COMPREHEN METABOLIC PANEL: CPT

## 2019-10-31 PROCEDURE — 99284 EMERGENCY DEPT VISIT MOD MDM: CPT

## 2019-10-31 PROCEDURE — 70491 CT SOFT TISSUE NECK W/DYE: CPT

## 2019-10-31 PROCEDURE — 74011636320 HC RX REV CODE- 636/320: Performed by: EMERGENCY MEDICINE

## 2019-10-31 PROCEDURE — 74011250636 HC RX REV CODE- 250/636: Performed by: PHYSICIAN ASSISTANT

## 2019-10-31 PROCEDURE — 94761 N-INVAS EAR/PLS OXIMETRY MLT: CPT

## 2019-10-31 PROCEDURE — 71045 X-RAY EXAM CHEST 1 VIEW: CPT

## 2019-10-31 PROCEDURE — 85025 COMPLETE CBC W/AUTO DIFF WBC: CPT

## 2019-10-31 PROCEDURE — 96374 THER/PROPH/DIAG INJ IV PUSH: CPT

## 2019-10-31 RX ORDER — CEPHALEXIN 500 MG/1
500 CAPSULE ORAL 2 TIMES DAILY
Qty: 14 CAP | Refills: 0 | Status: SHIPPED | OUTPATIENT
Start: 2019-10-31 | End: 2019-11-07

## 2019-10-31 RX ORDER — DEXAMETHASONE SODIUM PHOSPHATE 4 MG/ML
10 INJECTION, SOLUTION INTRA-ARTICULAR; INTRALESIONAL; INTRAMUSCULAR; INTRAVENOUS; SOFT TISSUE
Status: COMPLETED | OUTPATIENT
Start: 2019-10-31 | End: 2019-10-31

## 2019-10-31 RX ORDER — CEPHALEXIN 250 MG/1
500 CAPSULE ORAL
Status: COMPLETED | OUTPATIENT
Start: 2019-10-31 | End: 2019-10-31

## 2019-10-31 RX ADMIN — DEXAMETHASONE SODIUM PHOSPHATE 10 MG: 4 INJECTION, SOLUTION INTRAMUSCULAR; INTRAVENOUS at 16:53

## 2019-10-31 RX ADMIN — CEPHALEXIN 500 MG: 250 CAPSULE ORAL at 19:05

## 2019-10-31 RX ADMIN — IOPAMIDOL 80 ML: 612 INJECTION, SOLUTION INTRAVENOUS at 17:01

## 2019-10-31 NOTE — ED PROVIDER NOTES
EMERGENCY DEPARTMENT HISTORY AND PHYSICAL EXAM 
 
Date: 10/31/2019 Patient Name: Sarah Garcia History of Presenting Illness Chief Complaint Patient presents with  Jaw Swelling  Respiratory Distress History Provided By: Patient Chief Complaint: Jaw pain and respiratory distress Duration: 2-3 Days Timing:  Acute Location: Right submandibular Quality: Sharp and Pressure Severity: Moderate Modifying Factors: worse w/ swallowing Associated Symptoms: denies any other associated signs or symptoms HPI: Sarah Garcia is a 74 y.o. female with a PMH of MELANIE, hypothyroidism, COPD, CHF, ESRD on dialysis M,W,F and DM who presents with a right submandibular 3 x 4 cm tender mass. She states her primary care provider sent her over to the ER to get a CT scan of her neck. She states the mass in her neck makes it difficult to swallow and eat or drink food do to pain. She stated this mass started to develop 3 days ago. States neck US was done but with inconclusive findings on exam.  She denies subjective fevers or chills, difficulty tolerating oral secretions, and has no other symptoms or complaints. PCP: Juan Reinoso MD 
 
Current Outpatient Medications Medication Sig Dispense Refill  cephALEXin (KEFLEX) 500 mg capsule Take 1 Cap by mouth two (2) times a day for 7 days. 14 Cap 0  
 losartan (COZAAR) 100 mg tablet TAKE 1 TABLET BY MOUTH ONCE DAILY 90 Tab 0  
 atorvastatin (LIPITOR) 40 mg tablet TAKE 1/2 TABLET BY MOUTH DAILY 90 Tab 1  
 metoprolol succinate (TOPROL-XL) 100 mg tablet Take 1 Tab by mouth daily. (Patient taking differently: Take 100 mg by mouth two (2) times a day.) 30 Tab 6  
 RENVELA 800 mg tab tab Take 800 mg by mouth three (3) times daily. Patient reports medication is taken five times daily with meals and snacks  11  
 FABBY-DARLIN 0.8 mg tab tablet Take 0.8 mg by mouth daily.   6  
  OXYGEN-AIR DELIVERY SYSTEMS Take 3 L by inhalation continuous.  budesonide-formoterol (SYMBICORT) 160-4.5 mcg/actuation HFA inhaler Take 2 Puffs by inhalation two (2) times a day. Past History Past Medical History: 
Past Medical History:  
Diagnosis Date  Anemia in CKD (chronic kidney disease)  Aortic stenosis  Asthma  Calculus of kidney  Cancer ([facility] right kidney  Cardiomyopathy ([facility] EF 20-25% (10/16), 55% (06/16)  Chronic kidney disease   
 dialysis MWF  Chronic kidney disease, stage V ([facility]  Diabetes ([facility]  Dialysis patient [facility])  Dialysis patient [facility]) M-W-F; LEFT AVF; PERM CATH RIGHT CHEST  Gastrointestinal hemorrhage associated with peptic ulcer  Gastrointestinal hemorrhage associated with peptic ulcer 10/5/2016  Gout  Hypercholesterolemia  Hypertension  Menopause  Primary hyperparathyroidism ([facility] 2010  
 s/p parathyroid adenoma excision  Secondary hyperparathyroidism (of renal origin)  Sleep apnea C PAP  Status post nephrectomy   
 right  Thyroid disease Past Surgical History: 
Past Surgical History:  
Procedure Laterality Date  COLONOSCOPY  08/28/2015 Repeat 5 years  HX GYN    
 C-sections x 3  
 HX HEENT    
 total thyroidectomy  HX NEPHRECTOMY    
 right  HX THYROIDECTOMY  HX VASCULAR ACCESS    
 dialysis catheter R upper chest  
 STEVIE STEREO  BX BREAST LT 1ST LESION W/CLIP AND SPECIMEN Left 10/31/2017  VASCULAR SURGERY PROCEDURE UNLIST    
 fistula LUE Family History: 
Family History Problem Relation Age of Onset  Hypertension Mother  Diabetes Mother  No Known Problems Father  No Known Problems Sister  No Known Problems Brother  No Known Problems Other  No Known Problems Brother  No Known Problems Brother  No Known Problems Daughter  No Known Problems Son  No Known Problems Son   
 
 
Social History: Social History Tobacco Use  Smoking status: Never Smoker  Smokeless tobacco: Never Used Substance Use Topics  Alcohol use: No  
 Drug use: No  
 
 
Allergies: Allergies Allergen Reactions  Ace Inhibitors Cough  Aspirin Other (comments) Hallucinations  Hydralazine Other (comments) Hallucinations, dizziness and numbness in legs  Sensipar [Cinacalcet] Unknown (comments) Review of Systems Review of Systems Constitutional: Negative for chills, fatigue and fever. HENT: Positive for trouble swallowing. Negative for congestion and sore throat. Eyes: Negative. Respiratory: Positive for cough. Negative for shortness of breath. Cardiovascular: Negative for chest pain and palpitations. Gastrointestinal: Negative for abdominal pain, nausea and vomiting. Genitourinary: Negative for dysuria. Musculoskeletal: Positive for neck pain. Skin: Negative. Neurological: Negative for dizziness, weakness, light-headedness and headaches. Psychiatric/Behavioral: Negative. All other systems reviewed and are negative. Physical Exam  
 
Vitals:  
 10/31/19 1820 10/31/19 1830 10/31/19 1840 10/31/19 1850 BP: 127/51 127/52 135/54 135/53 Pulse:      
Resp:      
Temp:      
SpO2: 90% 91% 93% 90% Weight:      
 
Physical Exam  
Constitutional: She is oriented to person, place, and time. She appears well-developed and well-nourished. No distress. HENT:  
Head: Normocephalic and atraumatic. Right Ear: External ear and ear canal normal.  
Left Ear: External ear and ear canal normal.  
Nose: Nose normal.  
Mouth/Throat: Oropharynx is clear and moist.  
Mallampati score 4 - unable to visualize posterior oropharynx Tolerating oral secretions well Eyes: Pupils are equal, round, and reactive to light. Conjunctivae are normal. No scleral icterus. Neck: Normal range of motion. Neck supple. No JVD present. No tracheal deviation present. Cardiovascular: Regular rhythm and normal heart sounds. Tachycardia present. No murmur heard. Pulmonary/Chest: Effort normal and breath sounds normal. No stridor. No respiratory distress. She has no wheezes. She has no rales. Abdominal: Soft. There is no tenderness. Musculoskeletal: Normal range of motion. Lymphadenopathy:  
  She has cervical adenopathy. Neurological: She is alert and oriented to person, place, and time. She has normal strength. Gait normal. GCS eye subscore is 4. GCS verbal subscore is 5. GCS motor subscore is 6. Skin: Skin is warm and dry. She is not diaphoretic. Psychiatric: She has a normal mood and affect. Nursing note and vitals reviewed. Diagnostic Study Results Labs - Recent Results (from the past 12 hour(s)) CBC WITH AUTOMATED DIFF Collection Time: 10/31/19  4:47 PM  
Result Value Ref Range WBC 11.3 4.6 - 13.2 K/uL  
 RBC 4.20 4. 20 - 5.30 M/uL  
 HGB 11.7 (L) 12.0 - 16.0 g/dL HCT 36.7 35.0 - 45.0 % MCV 87.4 74.0 - 97.0 FL  
 MCH 27.9 24.0 - 34.0 PG  
 MCHC 31.9 31.0 - 37.0 g/dL  
 RDW 18.4 (H) 11.6 - 14.5 % PLATELET 956 383 - 642 K/uL MPV 9.9 9.2 - 11.8 FL  
 NEUTROPHILS 85 (H) 40 - 73 % LYMPHOCYTES 9 (L) 21 - 52 % MONOCYTES 6 3 - 10 % EOSINOPHILS 0 0 - 5 % BASOPHILS 0 0 - 2 %  
 ABS. NEUTROPHILS 9.7 (H) 1.8 - 8.0 K/UL  
 ABS. LYMPHOCYTES 1.0 0.9 - 3.6 K/UL  
 ABS. MONOCYTES 0.7 0.05 - 1.2 K/UL  
 ABS. EOSINOPHILS 0.0 0.0 - 0.4 K/UL  
 ABS. BASOPHILS 0.0 0.0 - 0.1 K/UL  
 DF AUTOMATED METABOLIC PANEL, COMPREHENSIVE Collection Time: 10/31/19  4:47 PM  
Result Value Ref Range Sodium 136 136 - 145 mmol/L Potassium 3.1 (L) 3.5 - 5.5 mmol/L Chloride 98 (L) 100 - 111 mmol/L  
 CO2 24 21 - 32 mmol/L Anion gap 14 3.0 - 18 mmol/L Glucose 68 (L) 74 - 99 mg/dL BUN 22 (H) 7.0 - 18 MG/DL Creatinine 4.97 (H) 0.6 - 1.3 MG/DL  
 BUN/Creatinine ratio 4 (L) 12 - 20 GFR est AA 10 (L) >60 ml/min/1.73m2 GFR est non-AA 8 (L) >60 ml/min/1.73m2 Calcium 7.5 (L) 8.5 - 10.1 MG/DL Bilirubin, total 0.6 0.2 - 1.0 MG/DL  
 ALT (SGPT) 13 13 - 56 U/L  
 AST (SGOT) 13 10 - 38 U/L Alk. phosphatase 286 (H) 45 - 117 U/L Protein, total 7.4 6.4 - 8.2 g/dL Albumin 3.2 (L) 3.4 - 5.0 g/dL Globulin 4.2 (H) 2.0 - 4.0 g/dL A-G Ratio 0.8 0.8 - 1.7 Radiologic Studies -  
CT NECK SOFT TISSUE W CONT Final Result IMPRESSION:  
  
There is enlargement and inflammation of the right submandibular gland with a  
large sialolith measuring 12 x 7 x 15 mm suggesting sialoadenitis. Adjacent  
rim-enhancing fluid collection is seen which may represent a dilated duct or  
abscess in the region the submandibular gland. There is adjacent mass effect on  
the sublingual muscles and hypopharynx causing narrowing of the hypopharynx on  
the right. Advise ENT consultation. There is severe medial deviation of both common carotid arteries suggesting  
kissing carotid arteries. There is proptosis of both lobes concerning for thyroid ophthalmopathy. Fluid in both mastoid sinuses. This is nonspecific. XR CHEST PORT    (Results Pending) CT Results  (Last 48 hours) 10/31/19 1705  CT NECK SOFT TISSUE W CONT Final result Impression:  IMPRESSION:  
   
There is enlargement and inflammation of the right submandibular gland with a  
large sialolith measuring 12 x 7 x 15 mm suggesting sialoadenitis. Adjacent  
rim-enhancing fluid collection is seen which may represent a dilated duct or  
abscess in the region the submandibular gland. There is adjacent mass effect on  
the sublingual muscles and hypopharynx causing narrowing of the hypopharynx on  
the right. Advise ENT consultation. There is severe medial deviation of both common carotid arteries suggesting  
kissing carotid arteries. There is proptosis of both lobes concerning for thyroid ophthalmopathy. Fluid in both mastoid sinuses. This is nonspecific. Narrative:  EXAM: CT of the soft tissues neck INDICATION: Difficulty swallowing x3 days painful COMPARISON: CT of the head dated October 6, 2016. TECHNIQUE: Axial CT imaging of the soft tissues the neck was performed with  
intravenous contrast. Multiplanar reformats were generated. One or more dose  
reduction techniques were used on this CT: automated exposure control,  
adjustment of the mAs and/or kVp according to patient size, and iterative  
reconstruction techniques. The specific techniques used on this CT exam have  
been documented in the patient's electronic medical record. Digital Imaging and  
Communications in Medicine (DICOM) format image data are available to  
nonaffiliated external healthcare facilities or entities on a secure, media  
free, reciprocally searchable basis with patient authorization for at least a  
12-month period after this study. _______________ FINDINGS:  
   
Visualized brain demonstrates prominence of the ventricles unchanged. There is proptosis of both globes concerning for thyroid ophthalmopathy. Posterior nasopharynx, oropharynx, are unremarkable. There is enlargement of the right submandibular gland with hyperenhancement  
compared to the left. There is a large sialolith measuring 12 x 7 x 15 mm in the  
right submandibular gland. There is surrounding rim-enhancing fluid collection  
just superior to this sialolith measuring 17 x 17x11 mm. This is suggestive of a  
dilated duct however abscess not excluded. There is inflammation seen around the  
right submandibular gland. There is mass effect along the right side sublingual  
region. Findings are suggestive of sialoadenitis. ENT consultation advised. There is wall thickening and mass effect in the right side of the hypopharynx. The right vallecula and piriform sinuses are compressed.  Vocal cords and  
 visualized trachea are normal.  
   
Thyroid gland, left submandibular gland and parotid glands are unremarkable. Mild to moderate calcific atherosclerosis present. There is medial deviation of  
both common carotid arteries suggesting kissing carotid arteries. Mildly enlarged right cervical lymph nodes are seen measuring up to 1 cm. Bone windows demonstrate paranasal sinuses to be clear. There is fluid in the  
mastoid sinuses. This is a nonspecific finding. Multilevel degenerative disc  
disease cervical spine and osteopenia present. There are dental caries. _______________ CXR Results  (Last 48 hours) None Medical Decision Making I am the first provider for this patient. I reviewed the vital signs, available nursing notes, past medical history, past surgical history, family history and social history. Vital Signs-Reviewed the patient's vital signs. Records Reviewed: Nursing Notes, Old Medical Records, Previous Radiology Studies and Previous Laboratory Studies 4:32 PM 
73 y/o female who presents to the ER c/o right sided neck pain w/ swelling. Onset 2 days ago and worsening. Reports following up w/ pcp and had outpt US performed for better evaluation however has not heard results. Reports worsening pain w/ swallowing. Low grade subjective fever yesterday. Denied any recent sick contacts. Dialysis pt on M,W,F and reports never missing treatments. Hard quarter size mass noted to right lateral neck on exam concerning for lymph node vs. indeterminate mass. US results recommend ct soft tissue neck. Will plan on labs, imaging at this time. Jeffery Villanueva PA-C 
 
 7:25 PM 
CT soft tissue neck shows sialoadenitis w/ sialolith measuring 12 x 7 x 15 mm of R submandibular gland. Adjacent rim-enhancing fluid collection seen which may represent a dilated duct or abscess. Recommend ENT consult. Discussed pt w/ Dr. Nighat Jimenes, ENT on-call at [facility] who advised to start pt on abx and follow up w/ outpt. Discussed all results w/ pt and family at bedside. Will cover w/ keflex and discussed strict return precautions if symptoms worsen, or unable to tolerate oral secretions. Pt expressed full understanding. Stable for discharge. All questions answered and patient in agreement with plan of care. Will plan for discharge. Radha Jo PA-C Disposition: 
discharged DISCHARGE NOTE:  
 
  Care plan outlined and precautions discussed. Patient has no new complaints, changes, or physical findings. Results of labs, imaging were reviewed with the patient. All medications were reviewed with the patient; will d/c home with keflex. All of pt's questions and concerns were addressed. Patient was instructed and agrees to follow up with ENT, as well as to return to the ED upon further deterioration. Patient is ready to go home. Follow-up Information Follow up With Specialties Details Why Contact Info [facility] EMERGENCY DEPT Emergency Medicine  If symptoms worsen [address] 
[phone] Alex Fraga MD Otolaryngology Schedule an appointment as soon as possible for a visit in 1 day ENT referal for gland stone as we discussed [address] [address] [phone] Tanya Barber MD Otolaryngology, Surgery Schedule an appointment as soon as possible for a visit in 1 day ENT referal for gland stone as we discussed [address] [facility] 
[phone] [address] [place] [address]  Call in 1 day ENT referral for gland stone [address] [facility] [address] ([facility]) [address] [phone] Leif Mix MD Internal Medicine Call in 1 day As needed for ER follow up [address] [facility] [address] [place] [id number] [phone] Discharge Medication List as of 10/31/2019  6:52 PM  
  
START taking these medications Details  
cephALEXin (KEFLEX) 500 mg capsule Take 1 Cap by mouth two (2) times a day for 7 days. , Print, Disp-14 Cap, R-0  
  
  
CONTINUE these medications which have NOT CHANGED Details  
losartan (COZAAR) 100 mg tablet TAKE 1 TABLET BY MOUTH ONCE DAILY, Normal, Disp-90 Tab, R-0  
  
atorvastatin (LIPITOR) 40 mg tablet TAKE 1/2 TABLET BY MOUTH DAILY, Normal, Disp-90 Tab, R-1  
  
metoprolol succinate (TOPROL-XL) 100 mg tablet Take 1 Tab by mouth daily. , Normal, Disp-30 Tab, R-6  
  
RENVELA 800 mg tab tab Take 800 mg by mouth three (3) times daily. Patient reports medication is taken five times daily with meals and snacks, Historical Med, R-11, [facility]ER  
  
FABBY-DARLIN 0.8 mg tab tablet Take 0.8 mg by mouth daily. , Historical Med, R-11, [facility] OXYGEN-AIR DELIVERY SYSTEMS Take 3 L by inhalation continuous. , Historical Med  
  
budesonide-formoterol (SYMBICORT) 160-4.5 mcg/actuation HFA inhaler Take 2 Puffs by inhalation two (2) times a day.  , Historical Med  
  
  
 
 
Provider Notes (Medical Decision Making):  
 
Procedures: 
Procedures Diagnosis Clinical Impression: 1. Acute sialoadenitis 2. Submandibular sialolithiasis

## 2019-10-31 NOTE — DISCHARGE INSTRUCTIONS
Patient Education        Salivary Gland Infection: Care Instructions  Your Care Instructions    Salivary glands make saliva, or spit. An infection in these glands can make the glands swell and hurt. An infection can happen when bacteria gets into the gland. This is more common in people who have diabetes, poor tooth care, or stones in these glands. Bacteria can build up and cause an infection if you don't get enough fluids. It can also happen if the flow of saliva gets blocked by a small stone in the gland. A virus can also cause an infection. Your care depends on the cause. If the problem is caused by bacteria, your doctor may prescribe antibiotics. Home treatment may help. You can drink more fluids or suck on sugar-free lemon drops to increase the flow of saliva. Follow-up care is a key part of your treatment and safety. Be sure to make and go to all appointments, and call your doctor if you are having problems. It's also a good idea to know your test results and keep a list of the medicines you take. How can you care for yourself at home? · If your doctor prescribed antibiotics, take them as directed. Do not stop taking them just because you feel better. You need to take the full course of antibiotics. · Take an over-the-counter pain medicine if needed, such as acetaminophen (Tylenol), ibuprofen (Advil, Motrin), or naproxen (Aleve). Be safe with medicines. Read and follow all instructions on the label. · Do not take two or more pain medicines at the same time unless the doctor told you to. Many pain medicines have acetaminophen, which is Tylenol. Too much acetaminophen (Tylenol) can be harmful. · Drink plenty of fluids, enough so that your urine is light yellow or clear like water. If you have kidney, heart, or liver disease and have to limit fluids, talk with your doctor before you increase the amount of fluids you drink.   · Put an ice or heat pack (whichever feels better) on the swollen jaw for 10 to 20 minutes at a time. Put a thin cloth between the ice or heat pack and your skin. · Suck on ice chips or ice treats such as sugar-free flavored ice pops. Eat soft foods that do not have to be chewed much. · Use sugar-free gum or candies such as lemon drops. They increase saliva. · Avoid over-the-counter medicines that can give you a dry mouth. These medicines include antihistamines, such as diphenhydramine (Benadryl) or chlorpheniramine (Chlor-Trimeton). · Gently massage the infected gland. When should you call for help? Call your doctor now or seek immediate medical care if:    · You have symptoms of infection, such as:  ? Increased pain, swelling, warmth, or redness. ? Red streaks leading from the area. ? Pus draining from the area. ? A fever.     · You have new pain, or your pain is worse.    Watch closely for changes in your health, and be sure to contact your doctor if:    · You are not getting better as expected. Where can you learn more? Go to http://mihai-zach.info/. Enter F122 in the search box to learn more about \"Salivary Gland Infection: Care Instructions. \"  Current as of: October 21, 2018  Content Version: 12.2  © 6890-6908 [employer]. Care instructions adapted under license by [employer] (which disclaims liability or warranty for this information). If you have questions about a medical condition or this instruction, always ask your healthcare professional. [place] [address] any warranty or liability for your use of this information.

## 2019-10-31 NOTE — ED NOTES
I have reviewed discharge instructions with the patient. The patient verbalized understanding. Patient NAD, VSS and pain is 5/10 at discharge. Patient armband removed and shredded

## 2019-10-31 NOTE — ED TRIAGE NOTES
Pt has mass on right side of neck. Hasn't been able to swallow x 3 days. Painful. Seen at PCP and had outpt ultrasound yesterday. Came today cause hadn't heard results. Had dialysis yesterday.

## 2019-11-07 ENCOUNTER — HOSPITAL ENCOUNTER (OUTPATIENT)
Dept: PHYSICAL THERAPY | Age: 74
Discharge: HOME | End: 2019-11-07
Payer: MEDICARE

## 2019-11-07 PROCEDURE — 97110 THERAPEUTIC EXERCISES: CPT

## 2019-11-07 PROCEDURE — 97530 THERAPEUTIC ACTIVITIES: CPT

## 2019-11-07 NOTE — PROGRESS NOTES
PHYSICAL THERAPY - DAILY TREATMENT NOTE Patient Name: Sarah Garcia        Date: 2019 : 1945   YES Patient  Verified Visit #:     Insurance: Payor: Mason Oquendo / Plan: [address] [place] [address] HMO / Product Type: Managed Care Medicare / In time: 1:30 Out time: 2:11 Total Treatment Time: 41 Medicare/BCBS [place] Time Tracking (below) Total Timed Codes (min):  41 1:1 Treatment Time:  41 TREATMENT AREA =  Gait instability [R26.81] SUBJECTIVE Pain Level (on 0 to 10 scale):  5  / 10 Medication Changes/New allergies or changes in medical history, any new surgeries or procedures? NO    If yes, update Summary List  
Subjective Functional Status/Changes:  []  No changes reported Pt reports she has a stone in the right side of her neck that is making it difficult for her to eat and swallow. Pt states she saw her doctor this morning and they said they might have to take it out, they are going to follow up on it again in about a week. Pt reports she can get up her stairs a little better, but her back and right leg are sore after she does the stairs. OBJECTIVE 30 min Therapeutic Exercise:  [x]  See flow sheet Rationale:    increase ROM, increase strength, improve coordination, improve balance and increase proprioception to promote increased functional mobility and increased activity tolerance with ADL's. 10 min Therapeutic Activity: sit<>stand  
gait with RW  
Rationale:    improve coordination, improve balance and increase proprioception to improve the patients ability to perform ADLs, transfers and gait safely and independently. min Patient Education:  YES  Reviewed HEP [x]  Progressed/Changed HEP based on:   Provided pt updated HEP handout to review in prep for D/C NV. Encouraged pt to perform short but frequent walks at home with walker. Other Objective/Functional Measures: Pt required B UE A for sit<>stand transfers. Pt ambulated 10' in 2 minutes and 35 seconds with RW. Major Jointer for form with therapeutic exercise. Post Treatment Pain Level (on 0 to 10) scale:   5  / 10 ASSESSMENT Assessment/Changes in Function: Pt progressing toward I with HEP., demo's good form with therapeutic exercise. []  See Progress Note/Recertification Patient will continue to benefit from skilled PT services to modify and progress therapeutic interventions, address functional mobility deficits, address ROM deficits, address strength deficits, analyze and address soft tissue restrictions, analyze and cue movement patterns, analyze and modify body mechanics/ergonomics, assess and modify postural abnormalities, address imbalance/dizziness and instruct in home and community integration to attain remaining goals. Progress toward goals / Updated goals: 1.  Patient will increase FOTO Functional Status score to 50/100 to indicate decreased functional limitations. 2.  Patient will score > 21/28 on the Tinetti indicating decreased fall risk. gait with RW 
3.  Patient will be independent with home exercise program for self management of symptoms.  reports compliance with HEP. 4.  Patient will demonstrate ability to perform stair negotiation with 1 HR and supervision to increase safety and independence in her home. PLAN 
 
[]  Upgrade activities as tolerated YES Continue plan of care  
[]  Discharge due to :   
[]  Other:   
 
Therapist: Vilma Abdullahi PTA Date: 11/7/2019 Time: 1:42 PM  
 
Future Appointments Date Time Provider Marla Pollard 11/14/2019  3:00 PM [facility] AT [facility]  
12/2/2019  1:00 PM Tam Gauthier  [id number]   
12/11/2019  1:15 PM MD Jennifer Restrepo [facility] [address]  
6/3/2020  1:00 PM MD Jennifer Restrepo [facility] [address]

## 2019-11-14 ENCOUNTER — HOSPITAL ENCOUNTER (OUTPATIENT)
Dept: PHYSICAL THERAPY | Age: 74
Discharge: HOME | End: 2019-11-14
Payer: MEDICARE

## 2019-11-14 PROCEDURE — 97530 THERAPEUTIC ACTIVITIES: CPT

## 2019-11-14 NOTE — PROGRESS NOTES
PHYSICAL THERAPY - DAILY TREATMENT NOTE Patient Name: Sylvain Mancera        Date: 2019 : 1945   YES Patient  Verified Visit #:     Insurance: Payor: Nadyadanita Dunham / Plan: [address] [place] [address] HMO / Product Type: Managed Care Medicare / In time: 3:06 Out time: 3:35 Total Treatment Time: 34 Medicare/BCBS [place] Time Tracking (below) Total Timed Codes (min):  29 1:1 Treatment Time:  34 TREATMENT AREA =  Gait instability [R26.81] SUBJECTIVE Pain Level (on 0 to 10 scale):  3  / 10 LBP and thigh pain Medication Changes/New allergies or changes in medical history, any new surgeries or procedures? NO    If yes, update Summary List  
Subjective Functional Status/Changes:  []  No changes reported Pt reports she feels like she has made progress with therapy. Pt states she walks around her den at home, sometimes she uses her walker and sometimes she uses her stick ([facility]). OBJECTIVE 29 min Therapeutic Activity: FOTO Tinetti  
gait with RW  
stair negotiation 3 steps with B handrail and close SBA Rationale:   improve coordination, improve balance and increase proprioception to improve the patients ability to perform ADLs, transfers and gait safely and independently. min Patient Education:  YES  Reviewed HEP []  Progressed/Changed HEP based on:   Pt instructed to continue HEP 4 times a week and walk short distances at home frequently with RW. Other Objective/Functional Measures: 
 
Tinetti: 15/28 FOTO; 46/100 Pt negotiated up and down 3 steps with B handrail and close SBA. (3 minutes, 10 seconds) Pt ambulated 10' with rolling walker and close SBA. (3 minutes and 40 seconds) Post Treatment Pain Level (on 0 to 10) scale:   3  / 10 ASSESSMENT Assessment/Changes in Function:  
 
Pt's FOTO score improved 12  points since  initial evaluation indicating increased activity tolerance  and correlates to a 54% functional limitation. [x]  See Progress Note/Recertification Patient will continue to benefit from skilled PT services to  NA-See DC Summary Progress toward goals / Updated goals: 1.  Patient will increase FOTO Functional Status score to 50/100 to indicate decreased functional limitations. NOT MET =46/100 2.  Patient will score > 21/28 on the Tinetti indicating decreased fall risk. NOT MET 3.  Patient will be independent with home exercise program for self management of symptoms.  reports compliance with HEP. MET 4.  Patient will demonstrate ability to perform stair negotiation with 1 HR and supervision to increase safety and independence in her home. NOT MET   
 
PLAN 
 
[]  Upgrade activities as tolerated Na Continue plan of care [x]  Discharge due to : lack of progress toward goals   
[]  Other:   
 
Therapist: Lakisha Manning PTA Date: 11/14/2019 Time: 4:03 PM  
 
Future Appointments Date Time Provider Marla Pollard 12/2/2019  1:00 PM Chelo Martell  [address]  
12/11/2019  1:15 PM Edmund Self MD [address] [facility] [address]  
6/3/2020  1:00 PM Edmund Self MD [address] [facility] [address]

## 2019-11-14 NOTE — PROGRESS NOTES
[address] [facility] PHYSICAL THERAPY 
[address] [facility] [address], [place], [address] - Phone: [phone]  Fax: [phone] DISCHARGE SUMMARY Patient Name: Otilia Luna : 1945 Treatment/Medical Diagnosis: Gait instability [R26.81] Referral Source: Maynor Stewart MD    
Date of Initial Visit: 8/15/2019 Attended Visits: 20 Missed Visits: 3 SUMMARY OF TREATMENT Patient's treatment included LE strengthening exercises, gait and balance training, pt education and instruction in home exercise program.  
 
CURRENT STATUS Pt progressed slowly with therapy. Her Functional Status Summary score improved 12  points since  initial evaluation indicating increased activity tolerance  and correlates to a 54% functional limitation. Pt demo's limited functional mobility: Pt requires 3 minutes and 40 seconds to ambulate 10 feet with rolling walker and requires 3 minutes and 10 seconds to negotiate up and down 3 steps with B handrails. Patient's Tinetti Balance Assessment unchanged since  initial evaluation indicating pt still at high risk for falling. Pt reported one recently fall on 10-19-19, pt reports fell onto her side trying to walk down her stairs. Pt has now started sleeping in downstairs bedroom so she doesn't go up and down her stairs. Pt has met a plateau with her improvement and progression of therapeutic exercise at this time. Pt is being discharged with instruction to continue therapeutic exercise 4 days a week independently to facilitate continued gains in strength and endurance. Goal/Measure of Progress Goal Met?  
1.  1.  Patient will increase FOTO Functional Status score to 50/100 to indicate decreased functional limitations. Status at last Eval: FOTO 34/100 Current Status: FOTO 46/100 no  
2.  2.  Patient will score > 21/28 on the Tinetti indicating decreased fall risk. Cont LE strengthening Status at last Eval: Tinetti: 15/28 Current Status: Tinetti: 15/28 no  
3.  3.  Patient will be independent with home exercise program for self management of symptoms.  reports compliance with HEP Status at last Eval: compliant with HEP Current Status: I with HEP yes 4.  4.  Patient will demonstrate ability to perform stair negotiation with 1 HR and supervision to increase safety and independence in her home. Status at last Eval: Requires min A with stair negotiation for balance Current Status: close SBA and B handrail no RECOMMENDATIONS Discontinue therapy due to lack of appreciable progress towards goals. If you have any questions/comments please contact us directly at [phone]. Thank you for allowing us to assist in the care of your patient. LPTA Signature: Elinor Patel PTA Date: 11/14/2019 Therapist Signature: 
 
Reporting Period: Edmundo HUDDLESTON 
 
10/10/2019-11/14/2019 Time: 4:14 PM  
 
Physician Signature:        Date:       Time:

## 2019-12-02 ENCOUNTER — OFFICE VISIT (OUTPATIENT)
Dept: ORTHOPEDIC SURGERY | Age: 74
End: 2019-12-02

## 2019-12-02 VITALS
BODY MASS INDEX: 29.15 KG/M2 | HEART RATE: 66 BPM | HEIGHT: 56 IN | DIASTOLIC BLOOD PRESSURE: 72 MMHG | SYSTOLIC BLOOD PRESSURE: 124 MMHG | TEMPERATURE: 98 F | RESPIRATION RATE: 18 BRPM

## 2019-12-02 DIAGNOSIS — R26.81 GAIT INSTABILITY: ICD-10-CM

## 2019-12-02 DIAGNOSIS — M54.16 LUMBAR RADICULOPATHY: ICD-10-CM

## 2019-12-02 DIAGNOSIS — M54.50 LUMBAR BACK PAIN: ICD-10-CM

## 2019-12-02 DIAGNOSIS — M47.816 LUMBAR FACET ARTHROPATHY: Primary | ICD-10-CM

## 2019-12-02 DIAGNOSIS — R29.898 BILATERAL LEG WEAKNESS: ICD-10-CM

## 2019-12-02 NOTE — PROGRESS NOTES
[address]  [address], [address]  [place], [address] [place] [address] Street  Phone: [phone]  Fax: [phone]        Ester Manning  : 1945  PCP: Zoran Bailey MD  2019    NEW PATIENT      HISTORY OF PRESENT ILLNESS  Joshua Flores is a 74 y.o. female c/o low back pain radiating into the BLE with knee buckling. She notes that her legs and feet \"do not listen to me. \" She has been seen by Dr. Manuel Rodriguez for low back pain and unsteadiness causing falls. She has attended PT (8/15/19-19; Leif Coats). Pt reports pronation of her feet with ambulation, especially with walking down stairs, that she believes has contributed to her falls. Pt c/o episodic bilateral groin pain. PMHx: MELANIE, hypothyroidism, COPD, CHF, ESRD on dialysis M,W,F and DM. Pt's  notes that since she has been undergoing dialysis, she is no longer diabetic. She has been undergoing dialysis x 3 years. ASSESSMENT  Her symptoms may be due to facet arthropathy causing back pain and potential lumbar radiculopathy related to bilateral leg weakness. She has generalized weakness could also possibly be related to dialysis. Her extremely slow gait is not characteristic of any isolated problem I can identify at this time, but a central neurological issue could be responsible. In any case, she does not appear functionally mobile. PLAN  1. Lumbar MRI to r/o underlying pathology. 2. Referral to home health for PT & OT for strengthening, functional transfers, ambulation, and ADLs. Pt will f/u after MRI or sooner if needed. Diagnoses and all orders for this visit:    1. Lumbar facet arthropathy  -     MRI LUMB SPINE WO CONT; Future  -     REFERRAL TO HOME HEALTH    2. Lumbar radiculopathy  -     MRI LUMB SPINE WO CONT; Future  -     REFERRAL TO HOME HEALTH    3.  Lumbar back pain  -     AMB POC XRAY, SPINE, LUMBOSACRAL; 2 O  -     MRI LUMB SPINE WO CONT; Future  -     REFERRAL TO HOME HEALTH    4. Gait instability  -     MRI LUMB SPINE WO CONT; Future  -     REFERRAL TO HOME HEALTH    5. Bilateral leg weakness  -     MRI LUMB SPINE WO CONT; Future  -     REFERRAL TO [address] is seen today in consultation at the request of Chelsea Snow MD for complaints of low back pain and BLE weakness.       PAST MEDICAL HISTORY   Past Medical History:   Diagnosis Date    Anemia in CKD (chronic kidney disease)     Aortic stenosis     Asthma     Calculus of kidney     Cancer ([facility] [address]     right kidney    Cardiomyopathy ([facility] [address]     EF 20-25% (10/16), 55% (06/16)    Chronic kidney disease     dialysis MWF    Chronic kidney disease, stage V ([address]     Diabetes ([facility] [address]     Dialysis patient ([facility] [address]     Dialysis patient ([facility] [address]     M-W-F; LEFT AVF; PERM CATH RIGHT CHEST    Gastrointestinal hemorrhage associated with peptic ulcer     Gastrointestinal hemorrhage associated with peptic ulcer 10/5/2016    Gout     Hypercholesterolemia     Hypertension     Menopause     Primary hyperparathyroidism ([facility] [address] 2010    s/p parathyroid adenoma excision    Secondary hyperparathyroidism (of renal origin)     Sleep apnea     C PAP    Status post nephrectomy     right    Thyroid disease        Past Surgical History:   Procedure Laterality Date    COLONOSCOPY  08/28/2015    Repeat 5 years    HX GYN      C-sections x 3    HX HEENT      total thyroidectomy    HX NEPHRECTOMY      right    HX THYROIDECTOMY      HX VASCULAR ACCESS      dialysis catheter R upper chest    STEVIE STEREO  BX BREAST LT 1ST LESION W/CLIP AND SPECIMEN Left 10/31/2017    VASCULAR SURGERY PROCEDURE UNLIST      fistula LUE       MEDICATIONS    Current Outpatient Medications   Medication Sig Dispense Refill    losartan (COZAAR) 100 mg tablet TAKE 1 TABLET BY MOUTH ONCE DAILY 90 Tab 0    atorvastatin (LIPITOR) 40 mg tablet TAKE 1/2 TABLET BY MOUTH DAILY 90 Tab 1    metoprolol succinate (TOPROL-XL) 100 mg tablet Take 1 Tab by mouth daily. (Patient taking differently: Take 100 mg by mouth two (2) times a day.) 30 Tab 6    RENVELA 800 mg tab tab Take 800 mg by mouth three (3) times daily. Patient reports medication is taken five times daily with meals and snacks  11    FABBY-DARLIN 0.8 mg tab tablet Take 0.8 mg by mouth daily. 11    OXYGEN-AIR DELIVERY SYSTEMS Take 3 L by inhalation continuous.  budesonide-formoterol (SYMBICORT) 160-4.5 mcg/actuation HFA inhaler Take 2 Puffs by inhalation two (2) times a day. ALLERGIES  Allergies   Allergen Reactions    Ace Inhibitors Cough    Aspirin Other (comments)     Hallucinations    Hydralazine Other (comments)     Hallucinations, dizziness and numbness in legs     Sensipar [Cinacalcet] Unknown (comments)          SOCIAL HISTORY    Social History     Socioeconomic History    Marital status:      Spouse name: Not on file    Number of children: Not on file    Years of education: Not on file    Highest education level: Not on file   Tobacco Use    Smoking status: Never Smoker    Smokeless tobacco: Never Used   Substance and Sexual Activity    Alcohol use: No    Drug use: No    Sexual activity: Yes     Partners: Male       FAMILY HISTORY  Family History   Problem Relation Age of Onset    Hypertension Mother     Diabetes Mother     No Known Problems Father     No Known Problems Sister     No Known Problems Brother     No Known Problems Other     No Known Problems Brother     No Known Problems Brother     No Known Problems Daughter     No Known Problems Son     No Known Problems Son          REVIEW OF SYSTEMS  Review of Systems   Musculoskeletal: Positive for back pain, falls and myalgias.         Bilateral hip/groin pain  BLE weakness         PHYSICAL EXAMINATION  Visit Vitals  /72   Pulse 66   Temp 98 °F (36.7 °C) (Oral)   Resp 18   Ht 4' 8\" (1.422 m)   BMI 29.15 kg/m²         Pain Assessment  12/2/2019 Location of Pain Back   Location Modifiers -   Severity of Pain 5   Quality of Pain Sharp   Quality of Pain Comment -   Duration of Pain A few hours   Duration of Pain Comment -   Frequency of Pain Intermittent   Aggravating Factors -   Limiting Behavior -   Relieving Factors -   Relieving Factors Comment -   Result of Injury -   Work-Related Injury -   Type of Injury -         Constitutional:  Well developed, well nourished, in no acute distress. Psychiatric: Affect and mood are appropriate. HEENT: Normocephalic, atraumatic. Extraocular movements intact. Integumentary: No rashes or abrasions noted on exposed areas. Cardiovascular: Regular rate and rhythm. Pulmonary: Clear to auscultation bilaterally. SPINE/MUSCULOSKELETAL EXAM    Cervical spine:  Neck is midline. Normal muscle tone. No focal atrophy is noted. ROM pain free. Shoulder ROM intact. No tenderness to palpation. Negative Spurling's sign. Negative Tinel's sign. Negative Harper's sign. Sensation in the bilateral arms grossly intact to light touch. Lumbar spine:  No rash, ecchymosis, or gross obliquity. No fasciculations. No focal atrophy is noted. No pain with hip ROM. Full range of motion. No tenderness to palpation. No tenderness to palpation at the sciatic notch. SI joints non-tender (L>R) with weight-bearing; non tender when non-weight bearing. Trochanters non tender. Sensation in the bilateral legs grossly intact to light touch. Pain in right groin reproduced with internal rotation of the right hip. Negative CONSTANTINO and P4 on the left.       MOTOR:      Biceps  Triceps Deltoids Wrist Ext Wrist Flex Hand Intrin   Right +4/5 +4/5 +4/5 +4/5 +4/5 +4/5   Left +4/5 +4/5 +4/5 +4/5 +4/5 +4/5             Hip Flex  Quads Hamstrings Ankle DF EHL Ankle PF   Right 4/5 4/5 4/5 4/5 4/5 4/5   Left 4/5 4/5 4/5 4/5 4/5 4/5     DTRs are 1+ biceps, triceps, brachioradialis, patella, and Achilles. Negative Straight Leg raise. Squat not tested. No difficulty with tandem gait. Ambulation with single point cane. FWB. Also using wheelchair. RADIOGRAPHS  2V Lumbar XR images taken on 12/2/19 personally reviewed with patient:  Osteopenic appearance -  translucency of vertebrae   Generalized degenerative changes  Disc space narrowing at L5-S1  Endplate osteophytes     reviewed    Ms. Татьяна Ham has a reminder for a \"due or due soon\" health maintenance. I have asked that she contact her primary care provider for follow-up on this health maintenance. 30 minutes of face-to-face contact were spent with the patient during today's visit extensively discussing symptoms and treatment plan. All questions were answered. More than half of this visit today was spent on counseling. Written by Inga Marsh, as dictated by Dr. Ximena Monae. I, Dr. Ximena Monae, confirm that all documentation is accurate.

## 2019-12-04 ENCOUNTER — HOME HEALTH ADMISSION (OUTPATIENT)
Dept: HOME HEALTH SERVICES | Facility: HOME HEALTH | Age: 74
End: 2019-12-04
Payer: MEDICARE

## 2019-12-05 ENCOUNTER — HOME CARE VISIT (OUTPATIENT)
Dept: HOME HEALTH SERVICES | Facility: HOME HEALTH | Age: 74
End: 2019-12-05
Payer: MEDICARE

## 2019-12-05 ENCOUNTER — HOME CARE VISIT (OUTPATIENT)
Dept: SCHEDULING | Facility: HOME HEALTH | Age: 74
End: 2019-12-05
Payer: MEDICARE

## 2019-12-05 VITALS
SYSTOLIC BLOOD PRESSURE: 118 MMHG | OXYGEN SATURATION: 91 % | DIASTOLIC BLOOD PRESSURE: 70 MMHG | HEART RATE: 76 BPM | TEMPERATURE: 96.9 F

## 2019-12-05 PROCEDURE — 3331090002 HH PPS REVENUE DEBIT

## 2019-12-05 PROCEDURE — 3331090001 HH PPS REVENUE CREDIT

## 2019-12-05 PROCEDURE — 400013 HH SOC

## 2019-12-05 PROCEDURE — G0151 HHCP-SERV OF PT,EA 15 MIN: HCPCS

## 2019-12-06 ENCOUNTER — HOME CARE VISIT (OUTPATIENT)
Dept: HOME HEALTH SERVICES | Facility: HOME HEALTH | Age: 74
End: 2019-12-06
Payer: MEDICARE

## 2019-12-06 PROCEDURE — 3331090002 HH PPS REVENUE DEBIT

## 2019-12-06 PROCEDURE — 3331090001 HH PPS REVENUE CREDIT

## 2019-12-07 PROCEDURE — 3331090001 HH PPS REVENUE CREDIT

## 2019-12-07 PROCEDURE — 3331090002 HH PPS REVENUE DEBIT

## 2019-12-08 ENCOUNTER — HOME CARE VISIT (OUTPATIENT)
Dept: HOME HEALTH SERVICES | Facility: HOME HEALTH | Age: 74
End: 2019-12-08
Payer: MEDICARE

## 2019-12-08 PROCEDURE — 3331090001 HH PPS REVENUE CREDIT

## 2019-12-08 PROCEDURE — 3331090002 HH PPS REVENUE DEBIT

## 2019-12-09 PROCEDURE — 3331090002 HH PPS REVENUE DEBIT

## 2019-12-09 PROCEDURE — 3331090001 HH PPS REVENUE CREDIT

## 2019-12-10 ENCOUNTER — HOME CARE VISIT (OUTPATIENT)
Dept: SCHEDULING | Facility: HOME HEALTH | Age: 74
End: 2019-12-10
Payer: MEDICARE

## 2019-12-10 VITALS
DIASTOLIC BLOOD PRESSURE: 64 MMHG | HEART RATE: 70 BPM | SYSTOLIC BLOOD PRESSURE: 121 MMHG | OXYGEN SATURATION: 93 % | TEMPERATURE: 98.7 F

## 2019-12-10 PROCEDURE — 3331090002 HH PPS REVENUE DEBIT

## 2019-12-10 PROCEDURE — 3331090001 HH PPS REVENUE CREDIT

## 2019-12-10 PROCEDURE — G0157 HHC PT ASSISTANT EA 15: HCPCS

## 2019-12-11 ENCOUNTER — HOME CARE VISIT (OUTPATIENT)
Dept: SCHEDULING | Facility: HOME HEALTH | Age: 74
End: 2019-12-11
Payer: MEDICARE

## 2019-12-11 ENCOUNTER — OFFICE VISIT (OUTPATIENT)
Dept: FAMILY MEDICINE CLINIC | Age: 74
End: 2019-12-11

## 2019-12-11 VITALS
BODY MASS INDEX: 28.12 KG/M2 | TEMPERATURE: 96.6 F | HEIGHT: 56 IN | WEIGHT: 125 LBS | SYSTOLIC BLOOD PRESSURE: 121 MMHG | HEART RATE: 63 BPM | RESPIRATION RATE: 19 BRPM | DIASTOLIC BLOOD PRESSURE: 47 MMHG | OXYGEN SATURATION: 95 %

## 2019-12-11 DIAGNOSIS — K11.20 SIALADENITIS: ICD-10-CM

## 2019-12-11 DIAGNOSIS — R26.81 GAIT INSTABILITY: ICD-10-CM

## 2019-12-11 DIAGNOSIS — N18.6 ESRD (END STAGE RENAL DISEASE) ON DIALYSIS (HCC): ICD-10-CM

## 2019-12-11 DIAGNOSIS — Z99.89 OSA ON CPAP: ICD-10-CM

## 2019-12-11 DIAGNOSIS — I10 ESSENTIAL HYPERTENSION: Primary | ICD-10-CM

## 2019-12-11 DIAGNOSIS — E66.3 OVERWEIGHT (BMI 25.0-29.9): ICD-10-CM

## 2019-12-11 DIAGNOSIS — Z99.2 ESRD (END STAGE RENAL DISEASE) ON DIALYSIS (HCC): ICD-10-CM

## 2019-12-11 DIAGNOSIS — J44.9 CHRONIC OBSTRUCTIVE PULMONARY DISEASE, UNSPECIFIED COPD TYPE (HCC): ICD-10-CM

## 2019-12-11 DIAGNOSIS — G47.33 OSA ON CPAP: ICD-10-CM

## 2019-12-11 DIAGNOSIS — E03.4 HYPOTHYROIDISM DUE TO ACQUIRED ATROPHY OF THYROID: ICD-10-CM

## 2019-12-11 DIAGNOSIS — I35.0 NONRHEUMATIC AORTIC VALVE STENOSIS: ICD-10-CM

## 2019-12-11 PROCEDURE — 3331090001 HH PPS REVENUE CREDIT

## 2019-12-11 PROCEDURE — G0152 HHCP-SERV OF OT,EA 15 MIN: HCPCS

## 2019-12-11 PROCEDURE — 3331090002 HH PPS REVENUE DEBIT

## 2019-12-11 NOTE — PATIENT INSTRUCTIONS
Submandibular Gland Removal: Before Your Surgery What is submandibular gland removal? 
Submandibular gland removal is a type of surgery. It removes a saliva gland below your lower jaw. You may need this surgery if you have an infection or a tumor. Or you may need it if you have a blocked saliva duct. This duct is a tube that carries saliva from the gland to the mouth. To do the surgery, the doctor makes a cut in your neck under your lower jaw. This cut is called an incision. Then he or she removes the gland and closes the incision with stitches or glue. If your saliva duct is blocked, the doctor may also make a small incision under your tongue. Then the doctor can take out whatever is blocking the duct. You will probably go home on the same day as the surgery. Taking out the gland won't leave you with a dry mouth. Our mouths have many saliva glands. Follow-up care is a key part of your treatment and safety. Be sure to make and go to all appointments, and call your doctor if you are having problems. It's also a good idea to know your test results and keep a list of the medicines you take. What happens before surgery? 
 Surgery can be stressful. This information will help you understand what you can expect. And it will help you safely prepare for surgery. 
 Preparing for surgery 
  · Understand exactly what surgery is planned, along with the risks, benefits, and other options. · Tell your doctors ALL the medicines, vitamins, supplements, and herbal remedies you take. Some of these can increase the risk of bleeding or interact with anesthesia.  
  · If you take blood thinners, such as warfarin (Coumadin), clopidogrel (Plavix), or aspirin, be sure to talk to your doctor. He or she will tell you if you should stop taking these medicines before your surgery.  Make sure that you understand exactly what your doctor wants you to do.  
  · Your doctor will tell you which medicines to take or stop before your surgery. You may need to stop taking certain medicines a week or more before surgery. So talk to your doctor as soon as you can.  
  · If you have an advance directive, let your doctor know. It may include a living will and a durable power of  for health care. Bring a copy to the hospital. If you don't have one, you may want to prepare one. It lets your doctor and loved ones know your health care wishes. Doctors advise that everyone prepare these papers before any type of surgery or procedure. What happens on the day of surgery? · Follow the instructions exactly about when to stop eating and drinking. If you don't, your surgery may be canceled. If your doctor told you to take your medicines on the day of surgery, take them with only a sip of water.  
  · Take a bath or shower before you come in for your surgery. Do not apply lotions, perfumes, deodorants, or nail polish.  
  · Do not shave the surgical site yourself.  
  · Take off all jewelry and piercings. And take out contact lenses, if you wear them.  
 At the hospital or surgery center · Bring a picture ID.  
  · The area for surgery is often marked to make sure there are no errors.  
  · You will be kept comfortable and safe by your anesthesia provider. You will be asleep during the surgery.  
  · The surgery will take 1 to 2 hours.  
  · You may have a drain near your incision. The doctor will tell you when it can come out. Going home · Be sure you have someone to drive you home. Anesthesia and pain medicine make it unsafe for you to drive.  
  · You will be given more specific instructions about recovering from your surgery. They will cover things like diet, wound care, follow-up care, driving, and getting back to your normal routine. When should you call your doctor? · You have questions or concerns.  
  · You don't understand how to prepare for your surgery.  
  · You become ill before the surgery (such as fever, flu, or a cold).   · You need to reschedule or have changed your mind about having the surgery. Where can you learn more? Go to http://mihai-zach.info/. Enter A632 in the search box to learn more about \"Submandibular Gland Removal: Before Your Surgery. \" Current as of: October 21, 2018 Content Version: 12.2 © 5702-4471 [employer]. Care instructions adapted under license by [employer] (which disclaims liability or warranty for this information). If you have questions about a medical condition or this instruction, always ask your healthcare professional. [place] [address] any warranty or liability for your use of this information.

## 2019-12-11 NOTE — PROGRESS NOTES
Chief Complaint Patient presents with  Follow Up Chronic Condition 3 month Pt is a 66y.o. year old female who presents for follow up of her chronic medical problems Health Maintenance Due Topic Date Due  Shingrix Vaccine Age 50> (1 of 2)-at her [facility] 12/06/1993  
 EYE EXAM RETINAL OR DILATED -at [facility] 09/28/2017  GLAUCOMA SCREENING Q2Y  06/22/2019 BP Readings from Last 3 Encounters:  
12/11/19 121/47  
12/10/19 121/64  
12/05/19 118/70 On Losartan, Metoprolol HCTZ Last Point of Care HGB A1C Hemoglobin A1c (POC) Date Value Ref Range Status 09/04/2019 5.6 % Final  
 not on meds for DM Lab Results Component Value Date/Time Cholesterol, total 143 05/08/2019 12:00 AM  
 HDL Cholesterol 59 05/08/2019 12:00 AM  
 LDL, calculated 56 05/08/2019 12:00 AM  
 VLDL, calculated 28 05/08/2019 12:00 AM  
 Triglyceride 142 05/08/2019 12:00 AM  
 CHOL/HDL Ratio 2.3 09/29/2010 02:20 PM  
on Lipitor COPD -on Symbicort, saw Dr Darrius Zamorano recently ESRD on HD On therapy for her back/saw ortho Wheelchair bound Gait instability-\"disconnect bet feet and brain per \" Saw neuro-no NPH Has MRI for lower back scheduled Saw ENT for salivary gland stone on the right/dx with sialodenitis-referred to another specialist; no longer hurting when she swallows Lab Results Component Value Date/Time TSH 5.880 (H) 09/04/2019 02:32 AM  
not on meds Compliant with CPAP Saw Dr Radha Dietrich for AS 
 
ROS: 
 
Pt denies: Wt loss, Fever/Chills, HA, Visual changes, Fatigue, Chest pain, SOB, LEBLANC, Abd pain, N/V/D/C, Blood in stool or urine, Edema. Pertinent positive as above in HPI. All others were negative Patient Active Problem List  
Diagnosis Code  Gout M10.9  Hyperlipidemia E78.5  Tubular adenoma of colon D12.6  Anemia in chronic kidney disease N18.9, D63.1  MELANIE on CPAP G47.33, Z99.89  
 CHF (congestive heart failure) (HCC) I50.9  Type 2 diabetes mellitus with hyperglycemia, with long-term current use of insulin ([facility]) E11.65, Z79.4  Advance directive discussed with patient Z70.80  
 Essential hypertension I10  
 Hypothyroidism due to acquired atrophy of thyroid E03.4  ESRD (end stage renal disease) on dialysis ([facility]) N18.6, Z99.2  Type 2 diabetes mellitus with nephropathy ([facility]) E11.21  
 Chronic obstructive pulmonary disease ([facility]) J44.9 Past Medical History:  
Diagnosis Date  Anemia in CKD (chronic kidney disease)  Aortic stenosis  Asthma  Calculus of kidney  Cancer ([facility] right kidney  Cardiomyopathy ([facility] EF 20-25% (10/16), 55% (06/16)  Chronic kidney disease   
 dialysis MWF  Chronic kidney disease, stage V ([facility]  Diabetes ([facility]  Dialysis patient [facility])  Dialysis patient [facility]) M-W-F; LEFT AVF; PERM CATH RIGHT CHEST  Gastrointestinal hemorrhage associated with peptic ulcer  Gastrointestinal hemorrhage associated with peptic ulcer 10/5/2016  Gout  Hypercholesterolemia  Hypertension  Menopause  Primary hyperparathyroidism ([facility] 2010  
 s/p parathyroid adenoma excision  Secondary hyperparathyroidism (of renal origin)  Sleep apnea C PAP  Status post nephrectomy   
 right  Thyroid disease Current Outpatient Medications Medication Sig Dispense Refill  metoprolol ta-hydrochlorothiaz (LOPRESSOR HCT) 50-25 mg per tablet Take 1 Tab by mouth daily.  losartan (COZAAR) 100 mg tablet TAKE 1 TABLET BY MOUTH ONCE DAILY 90 Tab 0  
 atorvastatin (LIPITOR) 40 mg tablet TAKE 1/2 TABLET BY MOUTH DAILY 90 Tab 1  
 metoprolol succinate (TOPROL-XL) 100 mg tablet Take 1 Tab by mouth daily. (Patient taking differently: Take 100 mg by mouth two (2) times a day.) 30 Tab 6  
 FABBY-DARLIN 0.8 mg tab tablet Take 0.8 mg by mouth daily. 11  
 OXYGEN-AIR DELIVERY SYSTEMS Take 3 L by inhalation continuous.  budesonide-formoterol (SYMBICORT) 160-4.5 mcg/actuation HFA inhaler Take 2 Puffs by inhalation two (2) times a day. Social History Tobacco Use Smoking Status Never Smoker Smokeless Tobacco Never Used Allergies Allergen Reactions  Ace Inhibitors Cough  Aspirin Other (comments) Hallucinations  Hydralazine Other (comments) Hallucinations, dizziness and numbness in legs  Sensipar [Cinacalcet] Unknown (comments) Patient Labs were reviewed: yes Patient Past Records were reviewed:  yes Objective:  
 
Vitals:  
 12/11/19 1336 BP: 121/47 Pulse: 63 Resp: 19 Temp: 96.6 °F (35.9 °C) TempSrc: Oral  
SpO2: 95% Weight: 125 lb (56.7 kg) Height: 4' 8\" (1.422 m) Body mass index is 28.02 kg/m². Exam:  
Appearance: alert, well appearing,  oriented to person, place, and time, acyanotic, in no respiratory distress and well hydrated. HEENT:  NC/AT, pink conj, anicteric sclerae Neck:  No cervical lymphadenopathy, no JVD, no thyromegaly, no carotid bruit Heart:  RRR, grade 3/6 HSM at the apex Lungs:  CTAB, no rhonchi, rales, or wheezes with good air exchange Abdomen:  Non-tender, pos bowel sounds, no hepatosplenomegaly Ext:  No C/C/E Skin: no rash Neuro: no lateralizing signs, CNs II-XII intact Assessment/ Plan:  
Diagnoses and all orders for this visit: 1. Essential hypertension-controlled, continue with present meds 2. ESRD (end stage renal disease) on dialysis ([facility] 3. Hypothyroidism due to acquired atrophy of thyroid-not on med, will start med if TSH is above 10 
 
4. Nonrheumatic aortic valve stenosis-Cardio following 5. Sialadenitis 6. MELANIE on CPAP-compliant 7. Chronic obstructive pulmonary disease, unspecified COPD type (HCC)-Pulmo following 8. Gait instability-currently undergoing PT; no NPH per Neuro 9. Overweight (BMI 25.0-29. 9)-discussed limiting abi to 9650-0312/day as she is unable to exercise Follow-up and Dispositions · Return in about 3 months (around 3/11/2020) for follow up. I have discussed the diagnosis with the patient and the intended plan as seen in the above orders. The patient has received an After-Visit Summary and questions were answered concerning future plans. Medication Side Effects and Warnings were discussed with patient: yes Patient verbalized understanding of above instructions. Devyn Hodge MD 
Internal Medicine [facility]

## 2019-12-11 NOTE — PROGRESS NOTES
Chief Complaint Patient presents with  Follow Up Chronic Condition 3 month 1. Have you been to the ER, urgent care clinic since your last visit? Hospitalized since your last visit? Yes Where: [facility] 2. Have you seen or consulted any other health care providers outside of the [address] [place] [address] since your last visit? Include any pap smears or colon screening. Yes Where: Ortho

## 2019-12-12 ENCOUNTER — HOME CARE VISIT (OUTPATIENT)
Dept: SCHEDULING | Facility: HOME HEALTH | Age: 74
End: 2019-12-12
Payer: MEDICARE

## 2019-12-12 VITALS
TEMPERATURE: 97 F | DIASTOLIC BLOOD PRESSURE: 65 MMHG | HEART RATE: 69 BPM | OXYGEN SATURATION: 92 % | SYSTOLIC BLOOD PRESSURE: 121 MMHG

## 2019-12-12 VITALS
DIASTOLIC BLOOD PRESSURE: 59 MMHG | SYSTOLIC BLOOD PRESSURE: 100 MMHG | HEART RATE: 76 BPM | OXYGEN SATURATION: 92 % | TEMPERATURE: 97.7 F

## 2019-12-12 PROCEDURE — 3331090002 HH PPS REVENUE DEBIT

## 2019-12-12 PROCEDURE — 3331090001 HH PPS REVENUE CREDIT

## 2019-12-12 PROCEDURE — G0157 HHC PT ASSISTANT EA 15: HCPCS

## 2019-12-13 ENCOUNTER — HOME CARE VISIT (OUTPATIENT)
Dept: SCHEDULING | Facility: HOME HEALTH | Age: 74
End: 2019-12-13
Payer: MEDICARE

## 2019-12-13 VITALS
HEART RATE: 69 BPM | SYSTOLIC BLOOD PRESSURE: 106 MMHG | OXYGEN SATURATION: 96 % | DIASTOLIC BLOOD PRESSURE: 61 MMHG | TEMPERATURE: 97.1 F

## 2019-12-13 PROCEDURE — 3331090001 HH PPS REVENUE CREDIT

## 2019-12-13 PROCEDURE — G0158 HHC OT ASSISTANT EA 15: HCPCS

## 2019-12-13 PROCEDURE — 3331090002 HH PPS REVENUE DEBIT

## 2019-12-14 PROCEDURE — 3331090002 HH PPS REVENUE DEBIT

## 2019-12-14 PROCEDURE — 3331090001 HH PPS REVENUE CREDIT

## 2019-12-15 PROCEDURE — 3331090002 HH PPS REVENUE DEBIT

## 2019-12-15 PROCEDURE — 3331090001 HH PPS REVENUE CREDIT

## 2019-12-16 ENCOUNTER — HOME CARE VISIT (OUTPATIENT)
Dept: SCHEDULING | Facility: HOME HEALTH | Age: 74
End: 2019-12-16
Payer: MEDICARE

## 2019-12-16 VITALS
SYSTOLIC BLOOD PRESSURE: 128 MMHG | OXYGEN SATURATION: 94 % | DIASTOLIC BLOOD PRESSURE: 64 MMHG | TEMPERATURE: 98.4 F | HEART RATE: 73 BPM

## 2019-12-16 PROCEDURE — 3331090002 HH PPS REVENUE DEBIT

## 2019-12-16 PROCEDURE — 3331090001 HH PPS REVENUE CREDIT

## 2019-12-16 PROCEDURE — G0158 HHC OT ASSISTANT EA 15: HCPCS

## 2019-12-17 ENCOUNTER — HOME CARE VISIT (OUTPATIENT)
Dept: SCHEDULING | Facility: HOME HEALTH | Age: 74
End: 2019-12-17
Payer: MEDICARE

## 2019-12-17 VITALS
SYSTOLIC BLOOD PRESSURE: 126 MMHG | TEMPERATURE: 97.2 F | HEART RATE: 64 BPM | OXYGEN SATURATION: 94 % | DIASTOLIC BLOOD PRESSURE: 66 MMHG

## 2019-12-17 PROCEDURE — 3331090001 HH PPS REVENUE CREDIT

## 2019-12-17 PROCEDURE — G0157 HHC PT ASSISTANT EA 15: HCPCS

## 2019-12-17 PROCEDURE — 3331090002 HH PPS REVENUE DEBIT

## 2019-12-18 PROCEDURE — 3331090001 HH PPS REVENUE CREDIT

## 2019-12-18 PROCEDURE — 3331090002 HH PPS REVENUE DEBIT

## 2019-12-19 ENCOUNTER — HOME CARE VISIT (OUTPATIENT)
Dept: HOME HEALTH SERVICES | Facility: HOME HEALTH | Age: 74
End: 2019-12-19
Payer: MEDICARE

## 2019-12-19 PROCEDURE — 3331090002 HH PPS REVENUE DEBIT

## 2019-12-19 PROCEDURE — 3331090001 HH PPS REVENUE CREDIT

## 2019-12-20 ENCOUNTER — HOME CARE VISIT (OUTPATIENT)
Dept: SCHEDULING | Facility: HOME HEALTH | Age: 74
End: 2019-12-20
Payer: MEDICARE

## 2019-12-20 VITALS
OXYGEN SATURATION: 97 % | TEMPERATURE: 97.8 F | HEART RATE: 89 BPM | SYSTOLIC BLOOD PRESSURE: 134 MMHG | DIASTOLIC BLOOD PRESSURE: 66 MMHG

## 2019-12-20 VITALS
SYSTOLIC BLOOD PRESSURE: 123 MMHG | DIASTOLIC BLOOD PRESSURE: 62 MMHG | OXYGEN SATURATION: 91 % | HEART RATE: 72 BPM | TEMPERATURE: 97.5 F

## 2019-12-20 PROCEDURE — G0158 HHC OT ASSISTANT EA 15: HCPCS

## 2019-12-20 PROCEDURE — 3331090002 HH PPS REVENUE DEBIT

## 2019-12-20 PROCEDURE — 3331090001 HH PPS REVENUE CREDIT

## 2019-12-20 PROCEDURE — G0157 HHC PT ASSISTANT EA 15: HCPCS

## 2019-12-21 PROCEDURE — 3331090002 HH PPS REVENUE DEBIT

## 2019-12-21 PROCEDURE — 3331090001 HH PPS REVENUE CREDIT

## 2019-12-22 PROCEDURE — 3331090002 HH PPS REVENUE DEBIT

## 2019-12-22 PROCEDURE — 3331090001 HH PPS REVENUE CREDIT

## 2019-12-23 ENCOUNTER — HOME CARE VISIT (OUTPATIENT)
Dept: SCHEDULING | Facility: HOME HEALTH | Age: 74
End: 2019-12-23
Payer: MEDICARE

## 2019-12-23 VITALS
TEMPERATURE: 98 F | SYSTOLIC BLOOD PRESSURE: 102 MMHG | HEART RATE: 74 BPM | OXYGEN SATURATION: 93 % | DIASTOLIC BLOOD PRESSURE: 59 MMHG

## 2019-12-23 VITALS
DIASTOLIC BLOOD PRESSURE: 55 MMHG | SYSTOLIC BLOOD PRESSURE: 110 MMHG | TEMPERATURE: 98.1 F | HEART RATE: 69 BPM | OXYGEN SATURATION: 93 %

## 2019-12-23 PROCEDURE — G0157 HHC PT ASSISTANT EA 15: HCPCS

## 2019-12-23 PROCEDURE — G0152 HHCP-SERV OF OT,EA 15 MIN: HCPCS

## 2019-12-23 PROCEDURE — 3331090002 HH PPS REVENUE DEBIT

## 2019-12-23 PROCEDURE — 3331090001 HH PPS REVENUE CREDIT

## 2019-12-24 PROCEDURE — 3331090002 HH PPS REVENUE DEBIT

## 2019-12-24 PROCEDURE — 3331090001 HH PPS REVENUE CREDIT

## 2019-12-25 PROCEDURE — 3331090002 HH PPS REVENUE DEBIT

## 2019-12-25 PROCEDURE — 3331090001 HH PPS REVENUE CREDIT

## 2019-12-26 ENCOUNTER — HOME CARE VISIT (OUTPATIENT)
Dept: SCHEDULING | Facility: HOME HEALTH | Age: 74
End: 2019-12-26
Payer: MEDICARE

## 2019-12-26 VITALS
DIASTOLIC BLOOD PRESSURE: 57 MMHG | OXYGEN SATURATION: 94 % | TEMPERATURE: 97.7 F | HEART RATE: 92 BPM | SYSTOLIC BLOOD PRESSURE: 98 MMHG

## 2019-12-26 PROCEDURE — 3331090001 HH PPS REVENUE CREDIT

## 2019-12-26 PROCEDURE — 3331090002 HH PPS REVENUE DEBIT

## 2019-12-26 PROCEDURE — G0157 HHC PT ASSISTANT EA 15: HCPCS

## 2019-12-27 PROCEDURE — 3331090001 HH PPS REVENUE CREDIT

## 2019-12-27 PROCEDURE — 3331090002 HH PPS REVENUE DEBIT

## 2019-12-28 PROCEDURE — 3331090002 HH PPS REVENUE DEBIT

## 2019-12-28 PROCEDURE — 3331090001 HH PPS REVENUE CREDIT

## 2019-12-29 PROCEDURE — 3331090001 HH PPS REVENUE CREDIT

## 2019-12-29 PROCEDURE — 3331090002 HH PPS REVENUE DEBIT

## 2019-12-30 ENCOUNTER — HOME CARE VISIT (OUTPATIENT)
Dept: SCHEDULING | Facility: HOME HEALTH | Age: 74
End: 2019-12-30
Payer: MEDICARE

## 2019-12-30 VITALS
HEART RATE: 80 BPM | DIASTOLIC BLOOD PRESSURE: 64 MMHG | TEMPERATURE: 97.5 F | OXYGEN SATURATION: 93 % | SYSTOLIC BLOOD PRESSURE: 123 MMHG

## 2019-12-30 PROCEDURE — G0157 HHC PT ASSISTANT EA 15: HCPCS

## 2019-12-30 PROCEDURE — 3331090002 HH PPS REVENUE DEBIT

## 2019-12-30 PROCEDURE — 3331090001 HH PPS REVENUE CREDIT

## 2019-12-31 PROCEDURE — 3331090001 HH PPS REVENUE CREDIT

## 2019-12-31 PROCEDURE — 3331090002 HH PPS REVENUE DEBIT

## 2020-01-01 ENCOUNTER — TELEPHONE (OUTPATIENT)
Dept: ORTHOPEDIC SURGERY | Age: 75
End: 2020-01-01

## 2020-01-01 ENCOUNTER — APPOINTMENT (OUTPATIENT)
Dept: PHYSICAL THERAPY | Age: 75
End: 2020-01-01
Payer: MEDICARE

## 2020-01-01 ENCOUNTER — OFFICE VISIT (OUTPATIENT)
Dept: ORTHOPEDIC SURGERY | Age: 75
End: 2020-01-01

## 2020-01-01 ENCOUNTER — VIRTUAL VISIT (OUTPATIENT)
Dept: FAMILY MEDICINE CLINIC | Age: 75
End: 2020-01-01
Payer: MEDICARE

## 2020-01-01 ENCOUNTER — HOSPITAL ENCOUNTER (OUTPATIENT)
Dept: PHYSICAL THERAPY | Age: 75
Discharge: HOME | End: 2020-07-16
Payer: MEDICARE

## 2020-01-01 ENCOUNTER — HOME CARE VISIT (OUTPATIENT)
Dept: SCHEDULING | Facility: HOME HEALTH | Age: 75
End: 2020-01-01
Payer: MEDICARE

## 2020-01-01 ENCOUNTER — HOSPITAL ENCOUNTER (OUTPATIENT)
Dept: PHYSICAL THERAPY | Age: 75
Discharge: HOME | End: 2020-10-01
Payer: MEDICARE

## 2020-01-01 ENCOUNTER — HOSPITAL ENCOUNTER (OUTPATIENT)
Dept: PHYSICAL THERAPY | Age: 75
Discharge: HOME | End: 2020-09-22
Payer: MEDICARE

## 2020-01-01 ENCOUNTER — HOME CARE VISIT (OUTPATIENT)
Dept: HOME HEALTH SERVICES | Facility: HOME HEALTH | Age: 75
End: 2020-01-01

## 2020-01-01 ENCOUNTER — HOME CARE VISIT (OUTPATIENT)
Dept: HOME HEALTH SERVICES | Facility: HOME HEALTH | Age: 75
End: 2020-01-01
Payer: MEDICARE

## 2020-01-01 ENCOUNTER — HOME HEALTH ADMISSION (OUTPATIENT)
Dept: HOME HEALTH SERVICES | Facility: HOME HEALTH | Age: 75
End: 2020-01-01
Payer: MEDICARE

## 2020-01-01 ENCOUNTER — HOSPITAL ENCOUNTER (OUTPATIENT)
Dept: PHYSICAL THERAPY | Age: 75
Discharge: HOME | End: 2020-08-20
Payer: MEDICARE

## 2020-01-01 ENCOUNTER — HOSPITAL ENCOUNTER (OUTPATIENT)
Dept: PHYSICAL THERAPY | Age: 75
Discharge: HOME | End: 2020-09-08
Payer: MEDICARE

## 2020-01-01 ENCOUNTER — HOSPITAL ENCOUNTER (OUTPATIENT)
Dept: PHYSICAL THERAPY | Age: 75
Discharge: HOME | End: 2020-08-25
Payer: MEDICARE

## 2020-01-01 ENCOUNTER — HOSPITAL ENCOUNTER (OUTPATIENT)
Dept: PHYSICAL THERAPY | Age: 75
Discharge: HOME | End: 2020-10-08
Payer: MEDICARE

## 2020-01-01 ENCOUNTER — HOSPITAL ENCOUNTER (OUTPATIENT)
Dept: PHYSICAL THERAPY | Age: 75
Discharge: HOME | End: 2020-08-18
Payer: MEDICARE

## 2020-01-01 ENCOUNTER — HOSPITAL ENCOUNTER (OUTPATIENT)
Dept: PHYSICAL THERAPY | Age: 75
Discharge: HOME | End: 2020-08-27
Payer: MEDICARE

## 2020-01-01 ENCOUNTER — OFFICE VISIT (OUTPATIENT)
Dept: FAMILY MEDICINE CLINIC | Age: 75
End: 2020-01-01

## 2020-01-01 ENCOUNTER — HOSPITAL ENCOUNTER (OUTPATIENT)
Dept: PHYSICAL THERAPY | Age: 75
Discharge: HOME | End: 2020-09-29
Payer: MEDICARE

## 2020-01-01 VITALS
OXYGEN SATURATION: 92 % | SYSTOLIC BLOOD PRESSURE: 138 MMHG | TEMPERATURE: 97.7 F | HEART RATE: 60 BPM | DIASTOLIC BLOOD PRESSURE: 65 MMHG

## 2020-01-01 VITALS
SYSTOLIC BLOOD PRESSURE: 118 MMHG | TEMPERATURE: 97.5 F | HEART RATE: 92 BPM | OXYGEN SATURATION: 82 % | DIASTOLIC BLOOD PRESSURE: 60 MMHG

## 2020-01-01 VITALS
HEART RATE: 61 BPM | TEMPERATURE: 97.9 F | SYSTOLIC BLOOD PRESSURE: 121 MMHG | OXYGEN SATURATION: 97 % | DIASTOLIC BLOOD PRESSURE: 63 MMHG

## 2020-01-01 VITALS
OXYGEN SATURATION: 98 % | WEIGHT: 135 LBS | HEART RATE: 79 BPM | BODY MASS INDEX: 30.37 KG/M2 | HEIGHT: 56 IN | RESPIRATION RATE: 16 BRPM | SYSTOLIC BLOOD PRESSURE: 171 MMHG | TEMPERATURE: 97.5 F | DIASTOLIC BLOOD PRESSURE: 67 MMHG

## 2020-01-01 VITALS
DIASTOLIC BLOOD PRESSURE: 62 MMHG | SYSTOLIC BLOOD PRESSURE: 122 MMHG | BODY MASS INDEX: 30.37 KG/M2 | WEIGHT: 135 LBS | TEMPERATURE: 97.4 F | OXYGEN SATURATION: 97 % | HEIGHT: 56 IN | RESPIRATION RATE: 20 BRPM | HEART RATE: 84 BPM

## 2020-01-01 VITALS
OXYGEN SATURATION: 95 % | HEIGHT: 56 IN | WEIGHT: 128.6 LBS | DIASTOLIC BLOOD PRESSURE: 53 MMHG | TEMPERATURE: 97.7 F | BODY MASS INDEX: 28.93 KG/M2 | HEART RATE: 69 BPM | RESPIRATION RATE: 22 BRPM | SYSTOLIC BLOOD PRESSURE: 132 MMHG

## 2020-01-01 VITALS
DIASTOLIC BLOOD PRESSURE: 55 MMHG | SYSTOLIC BLOOD PRESSURE: 125 MMHG | RESPIRATION RATE: 16 BRPM | OXYGEN SATURATION: 97 % | TEMPERATURE: 97.8 F | HEIGHT: 56 IN | HEART RATE: 54 BPM | BODY MASS INDEX: 28.62 KG/M2 | WEIGHT: 127.2 LBS

## 2020-01-01 VITALS
DIASTOLIC BLOOD PRESSURE: 60 MMHG | TEMPERATURE: 98.6 F | OXYGEN SATURATION: 90 % | HEART RATE: 85 BPM | SYSTOLIC BLOOD PRESSURE: 118 MMHG

## 2020-01-01 VITALS
OXYGEN SATURATION: 98 % | HEART RATE: 58 BPM | DIASTOLIC BLOOD PRESSURE: 63 MMHG | TEMPERATURE: 96.8 F | SYSTOLIC BLOOD PRESSURE: 119 MMHG

## 2020-01-01 VITALS
OXYGEN SATURATION: 92 % | SYSTOLIC BLOOD PRESSURE: 138 MMHG | TEMPERATURE: 97.7 F | DIASTOLIC BLOOD PRESSURE: 65 MMHG | HEART RATE: 62 BPM

## 2020-01-01 DIAGNOSIS — I10 ESSENTIAL HYPERTENSION: ICD-10-CM

## 2020-01-01 DIAGNOSIS — Z99.2 ESRD (END STAGE RENAL DISEASE) ON DIALYSIS (HCC): ICD-10-CM

## 2020-01-01 DIAGNOSIS — R26.81 GAIT INSTABILITY: ICD-10-CM

## 2020-01-01 DIAGNOSIS — Z91.81 RISK FOR FALLS: ICD-10-CM

## 2020-01-01 DIAGNOSIS — Z71.89 ADVANCE DIRECTIVE DISCUSSED WITH PATIENT: ICD-10-CM

## 2020-01-01 DIAGNOSIS — M10.00 IDIOPATHIC GOUT, UNSPECIFIED CHRONICITY, UNSPECIFIED SITE: ICD-10-CM

## 2020-01-01 DIAGNOSIS — R29.898 MUSCULAR DECONDITIONING: ICD-10-CM

## 2020-01-01 DIAGNOSIS — G47.33 OSA ON CPAP: ICD-10-CM

## 2020-01-01 DIAGNOSIS — E11.21 TYPE 2 DIABETES WITH NEPHROPATHY (HCC): ICD-10-CM

## 2020-01-01 DIAGNOSIS — N18.6 ESRD (END STAGE RENAL DISEASE) ON DIALYSIS (HCC): ICD-10-CM

## 2020-01-01 DIAGNOSIS — I10 ESSENTIAL HYPERTENSION: Primary | ICD-10-CM

## 2020-01-01 DIAGNOSIS — I73.9 PVD (PERIPHERAL VASCULAR DISEASE) (HCC): ICD-10-CM

## 2020-01-01 DIAGNOSIS — N25.81 SECONDARY HYPERPARATHYROIDISM (HCC): ICD-10-CM

## 2020-01-01 DIAGNOSIS — M54.59 MECHANICAL LOW BACK PAIN: ICD-10-CM

## 2020-01-01 DIAGNOSIS — M79.18 MYOFASCIAL PAIN: Primary | ICD-10-CM

## 2020-01-01 DIAGNOSIS — I50.9 CONGESTIVE HEART FAILURE, UNSPECIFIED HF CHRONICITY, UNSPECIFIED HEART FAILURE TYPE (HCC): ICD-10-CM

## 2020-01-01 DIAGNOSIS — E66.3 OVERWEIGHT (BMI 25.0-29.9): ICD-10-CM

## 2020-01-01 DIAGNOSIS — Z99.89 OSA ON CPAP: ICD-10-CM

## 2020-01-01 DIAGNOSIS — R29.898 BILATERAL LEG WEAKNESS: ICD-10-CM

## 2020-01-01 DIAGNOSIS — R05.9 COUGH: ICD-10-CM

## 2020-01-01 DIAGNOSIS — J44.9 CHRONIC OBSTRUCTIVE PULMONARY DISEASE, UNSPECIFIED COPD TYPE (HCC): ICD-10-CM

## 2020-01-01 DIAGNOSIS — E78.5 HYPERLIPIDEMIA, UNSPECIFIED HYPERLIPIDEMIA TYPE: ICD-10-CM

## 2020-01-01 DIAGNOSIS — Z00.00 MEDICARE ANNUAL WELLNESS VISIT, SUBSEQUENT: Primary | ICD-10-CM

## 2020-01-01 DIAGNOSIS — I35.0 NONRHEUMATIC AORTIC VALVE STENOSIS: ICD-10-CM

## 2020-01-01 DIAGNOSIS — E03.4 HYPOTHYROIDISM DUE TO ACQUIRED ATROPHY OF THYROID: ICD-10-CM

## 2020-01-01 DIAGNOSIS — R92.8 ABNORMAL SCREENING MAMMOGRAM: Primary | ICD-10-CM

## 2020-01-01 DIAGNOSIS — K52.1 DIARRHEA DUE TO DRUG: ICD-10-CM

## 2020-01-01 DIAGNOSIS — E78.5 HYPERLIPIDEMIA, UNSPECIFIED HYPERLIPIDEMIA TYPE: Chronic | ICD-10-CM

## 2020-01-01 DIAGNOSIS — R29.898 MUSCULAR DECONDITIONING: Primary | ICD-10-CM

## 2020-01-01 DIAGNOSIS — H91.93 BILATERAL HEARING LOSS, UNSPECIFIED HEARING LOSS TYPE: ICD-10-CM

## 2020-01-01 LAB
CHOLEST SERPL-MCNC: 195 MG/DL (ref 100–199)
HBA1C MFR BLD HPLC: 5.4 %
HDLC SERPL-MCNC: 64 MG/DL
INTERPRETATION, 910389: NORMAL
LDLC SERPL CALC-MCNC: 107 MG/DL (ref 0–99)
TRIGL SERPL-MCNC: 118 MG/DL (ref 0–149)
TSH SERPL DL<=0.005 MIU/L-ACNC: 5.02 UIU/ML (ref 0.45–4.5)
URATE SERPL-MCNC: 1.2 MG/DL (ref 2.5–7.1)
VLDLC SERPL CALC-MCNC: 24 MG/DL (ref 5–40)

## 2020-01-01 PROCEDURE — 3331090002 HH PPS REVENUE DEBIT

## 2020-01-01 PROCEDURE — 3331090001 HH PPS REVENUE CREDIT

## 2020-01-01 PROCEDURE — 97110 THERAPEUTIC EXERCISES: CPT

## 2020-01-01 PROCEDURE — 1090F PRES/ABSN URINE INCON ASSESS: CPT | Performed by: INTERNAL MEDICINE

## 2020-01-01 PROCEDURE — 97530 THERAPEUTIC ACTIVITIES: CPT

## 2020-01-01 PROCEDURE — G0151 HHCP-SERV OF PT,EA 15 MIN: HCPCS

## 2020-01-01 PROCEDURE — G0152 HHCP-SERV OF OT,EA 15 MIN: HCPCS

## 2020-01-01 PROCEDURE — G0157 HHC PT ASSISTANT EA 15: HCPCS

## 2020-01-01 PROCEDURE — 97162 PT EVAL MOD COMPLEX 30 MIN: CPT

## 2020-01-01 PROCEDURE — G8427 DOCREV CUR MEDS BY ELIG CLIN: HCPCS | Performed by: INTERNAL MEDICINE

## 2020-01-01 PROCEDURE — G8417 CALC BMI ABV UP PARAM F/U: HCPCS | Performed by: INTERNAL MEDICINE

## 2020-01-01 PROCEDURE — 1101F PT FALLS ASSESS-DOCD LE1/YR: CPT | Performed by: INTERNAL MEDICINE

## 2020-01-01 PROCEDURE — G8432 DEP SCR NOT DOC, RNG: HCPCS | Performed by: INTERNAL MEDICINE

## 2020-01-01 PROCEDURE — 400013 HH SOC

## 2020-01-01 PROCEDURE — 99214 OFFICE O/P EST MOD 30 MIN: CPT | Performed by: INTERNAL MEDICINE

## 2020-01-01 PROCEDURE — G0439 PPPS, SUBSEQ VISIT: HCPCS | Performed by: INTERNAL MEDICINE

## 2020-01-01 PROCEDURE — G8399 PT W/DXA RESULTS DOCUMENT: HCPCS | Performed by: INTERNAL MEDICINE

## 2020-01-01 PROCEDURE — G9231 DOC ESRD DIA TRANS PREG: HCPCS | Performed by: INTERNAL MEDICINE

## 2020-01-01 PROCEDURE — G8536 NO DOC ELDER MAL SCRN: HCPCS | Performed by: INTERNAL MEDICINE

## 2020-01-01 RX ORDER — ATORVASTATIN CALCIUM 20 MG/1
20 TABLET, FILM COATED ORAL DAILY
Qty: 90 TAB | Refills: 1 | Status: SHIPPED | OUTPATIENT
Start: 2020-01-01

## 2020-01-01 RX ORDER — ATORVASTATIN CALCIUM 40 MG/1
TABLET, FILM COATED ORAL
Qty: 90 TAB | Refills: 1 | Status: SHIPPED | OUTPATIENT
Start: 2020-01-01 | End: 2020-01-01 | Stop reason: SDUPTHER

## 2020-01-01 RX ORDER — ATORVASTATIN CALCIUM 10 MG/1
10 TABLET, FILM COATED ORAL DAILY
Qty: 90 TAB | Refills: 1 | Status: SHIPPED | OUTPATIENT
Start: 2020-01-01 | End: 2020-01-01 | Stop reason: CLARIF

## 2020-01-01 RX ORDER — FOLIC ACID/VIT B COMPLEX AND C 0.8 MG
1 TABLET ORAL DAILY
Qty: 90 TAB | Refills: 1 | Status: SHIPPED | OUTPATIENT
Start: 2020-01-01

## 2020-01-01 RX ORDER — CHOLECALCIFEROL TAB 125 MCG (5000 UNIT) 125 MCG
TAB ORAL
COMMUNITY
Start: 2020-01-01

## 2020-01-01 RX ORDER — METOPROLOL SUCCINATE 50 MG/1
50 TABLET, EXTENDED RELEASE ORAL DAILY
Qty: 90 TAB | Refills: 1 | Status: SHIPPED | OUTPATIENT
Start: 2020-01-01 | End: 2020-01-01

## 2020-01-01 RX ORDER — BUDESONIDE AND FORMOTEROL FUMARATE DIHYDRATE 160; 4.5 UG/1; UG/1
2 AEROSOL RESPIRATORY (INHALATION) 2 TIMES DAILY
Qty: 1 INHALER | Refills: 3 | Status: SHIPPED | OUTPATIENT
Start: 2020-01-01

## 2020-01-01 RX ORDER — ALBUTEROL SULFATE 90 UG/1
2 AEROSOL, METERED RESPIRATORY (INHALATION)
COMMUNITY

## 2020-01-01 RX ORDER — LOSARTAN POTASSIUM 100 MG/1
100 TABLET ORAL DAILY
Qty: 90 TAB | Refills: 1 | Status: SHIPPED | OUTPATIENT
Start: 2020-01-01

## 2020-01-01 RX ORDER — LOSARTAN POTASSIUM 50 MG/1
50 TABLET ORAL DAILY
Qty: 90 TAB | Refills: 1 | Status: SHIPPED | OUTPATIENT
Start: 2020-01-01 | End: 2020-01-01 | Stop reason: DRUGHIGH

## 2020-01-01 RX ORDER — ATORVASTATIN CALCIUM 40 MG/1
TABLET, FILM COATED ORAL
Qty: 90 TAB | Refills: 1 | Status: SHIPPED | OUTPATIENT
Start: 2020-01-01 | End: 2020-01-01 | Stop reason: DRUGHIGH

## 2020-01-01 RX ORDER — AMLODIPINE BESYLATE 5 MG/1
5 TABLET ORAL DAILY
COMMUNITY

## 2020-01-01 RX ORDER — DIPHENOXYLATE HYDROCHLORIDE AND ATROPINE SULFATE 2.5; .025 MG/1; MG/1
1 TABLET ORAL
Qty: 60 TAB | Refills: 1 | Status: SHIPPED | OUTPATIENT
Start: 2020-01-01

## 2020-01-02 ENCOUNTER — HOME CARE VISIT (OUTPATIENT)
Dept: SCHEDULING | Facility: HOME HEALTH | Age: 75
End: 2020-01-02
Payer: MEDICARE

## 2020-01-02 VITALS
TEMPERATURE: 97.5 F | DIASTOLIC BLOOD PRESSURE: 62 MMHG | SYSTOLIC BLOOD PRESSURE: 115 MMHG | HEART RATE: 56 BPM | OXYGEN SATURATION: 91 %

## 2020-01-02 PROCEDURE — G0151 HHCP-SERV OF PT,EA 15 MIN: HCPCS

## 2020-01-02 PROCEDURE — 3331090003 HH PPS REVENUE ADJ

## 2020-01-02 PROCEDURE — 3331090001 HH PPS REVENUE CREDIT

## 2020-01-02 PROCEDURE — 3331090002 HH PPS REVENUE DEBIT

## 2020-01-03 PROCEDURE — 3331090001 HH PPS REVENUE CREDIT

## 2020-01-03 PROCEDURE — 3331090002 HH PPS REVENUE DEBIT

## 2020-01-04 PROCEDURE — 3331090002 HH PPS REVENUE DEBIT

## 2020-01-04 PROCEDURE — 3331090001 HH PPS REVENUE CREDIT

## 2020-01-05 PROCEDURE — 3331090001 HH PPS REVENUE CREDIT

## 2020-01-05 PROCEDURE — 3331090002 HH PPS REVENUE DEBIT

## 2020-01-16 ENCOUNTER — HOSPITAL ENCOUNTER (OUTPATIENT)
Dept: MRI IMAGING | Age: 75
Discharge: HOME | End: 2020-01-16
Attending: PHYSICAL MEDICINE & REHABILITATION
Payer: MEDICARE

## 2020-01-16 DIAGNOSIS — R26.81 GAIT INSTABILITY: ICD-10-CM

## 2020-01-16 DIAGNOSIS — M54.16 LUMBAR RADICULOPATHY: ICD-10-CM

## 2020-01-16 DIAGNOSIS — M54.50 LUMBAR BACK PAIN: ICD-10-CM

## 2020-01-16 DIAGNOSIS — M47.816 LUMBAR FACET ARTHROPATHY: ICD-10-CM

## 2020-01-16 DIAGNOSIS — R29.898 BILATERAL LEG WEAKNESS: ICD-10-CM

## 2020-01-16 PROCEDURE — 72148 MRI LUMBAR SPINE W/O DYE: CPT

## 2020-01-28 ENCOUNTER — HOSPITAL ENCOUNTER (OUTPATIENT)
Dept: MAMMOGRAPHY | Age: 75
Discharge: HOME | End: 2020-01-28
Attending: INTERNAL MEDICINE
Payer: MEDICARE

## 2020-01-28 ENCOUNTER — HOSPITAL ENCOUNTER (OUTPATIENT)
Dept: ULTRASOUND IMAGING | Age: 75
Discharge: HOME | End: 2020-01-28
Attending: INTERNAL MEDICINE
Payer: MEDICARE

## 2020-01-28 DIAGNOSIS — R92.2 INCONCLUSIVE MAMMOGRAM: ICD-10-CM

## 2020-01-28 DIAGNOSIS — R92.8 ABNORMALITY OF RIGHT BREAST ON SCREENING MAMMOGRAM: ICD-10-CM

## 2020-01-28 PROCEDURE — 77061 BREAST TOMOSYNTHESIS UNI: CPT

## 2020-01-28 PROCEDURE — 76642 ULTRASOUND BREAST LIMITED: CPT

## 2020-02-11 NOTE — PROGRESS NOTES
[facility] [address] 
[address], [address] [place], [address] [place] [address] Street Phone: [phone] Fax: [phone] Neyda Louise : 1945 PCP: Laila Guzman MD 
2020 PROGRESS NOTE HISTORY OF PRESENT ILLNESS Deborah Seo is a 66 y.o. female who was seen as a new patient 17 with c/o low back pain radiating into the BLE with knee buckling. She notes that her legs and feet \"do not listen to me. \" She has been seen by Dr. Brooklyn Maria for low back pain and unsteadiness causing falls. She has attended PT (8/15/19-19; Jose Shafer). Pt reports pronation of her feet with ambulation, especially with walking down stairs, that she believes has contributed to her falls. Pt c/o episodic bilateral groin pain. PMHx: MELANIE, hypothyroidism, COPD, CHF, ESRD on dialysis M,W,F and DM. Pt's  notes that since she has been undergoing dialysis, she is no longer diabetic. She has been undergoing dialysis x 3 years. Deborah Seo comes in to the office today for f/u. She saw some improvement with Home Health PT and she has been able to get around the house better and use an exercise bike for exercise. Her  notes that she is now able to ambulate around the house and up the stairs by herself. Lumbar spine MRI dated 2020 reviewed. Per report, No high-grade central or foraminal stenosis. No abnormal distal cord or conus signal is seen. Minimal degenerative changes in the lower lumbar spine , as described in detail in Findings section. Findings suggesting prior right-sided nephrectomy. Per my reading, there is significant muscular atrophy. She rates her pain as a 3/10 today. ASSESSMENT There is no apparent spinal pathology to explain her bilateral leg weakness. It is likely due to deconditioning. She has seen improvement of her mobility and ambulation with home health PT. PLAN 
 1. Continue home health for PT & OT for strengthening, functional transfers, ambulation, and ADLs. Pt will f/u in 8 weeks or sooner as needed. Diagnoses and all orders for this visit: 
 
1. Muscular deconditioning 
-     REFERRAL TO HOME HEALTH 2. Gait instability 
-     REFERRAL TO HOME HEALTH 3. Bilateral leg weakness 
-     REFERRAL TO HOME HEALTH 4. Risk for falls 
-     [address] [facility] Past Medical History:  
Diagnosis Date  Anemia in CKD (chronic kidney disease)  Aortic stenosis  Asthma  Calculus of kidney  Cancer ([facility] right kidney  Cardiomyopathy ([facility] EF 20-25% (10/16), 55% (06/16)  Chronic kidney disease   
 dialysis MWF  Chronic kidney disease, stage V ([facility]  Diabetes ([facility]  Dialysis patient [facility])  Dialysis patient [facility]) M-W-F; LEFT AVF; PERM CATH RIGHT CHEST  Gastrointestinal hemorrhage associated with peptic ulcer  Gastrointestinal hemorrhage associated with peptic ulcer 10/5/2016  Gout  Hypercholesterolemia  Hypertension  Menopause  Primary hyperparathyroidism ([facility] 2010  
 s/p parathyroid adenoma excision  Secondary hyperparathyroidism (of renal origin)  Sleep apnea C PAP  Status post nephrectomy   
 right  Thyroid disease Past Surgical History:  
Procedure Laterality Date  COLONOSCOPY  08/28/2015 Repeat 5 years  HX GYN    
 C-sections x 3  
 HX HEENT    
 total thyroidectomy  HX NEPHRECTOMY    
 right  HX THYROIDECTOMY  HX VASCULAR ACCESS    
 dialysis catheter R upper chest  
 STEVIE STEREO  BX BREAST LT 1ST LESION W/CLIP AND SPECIMEN Left 10/31/2017  VASCULAR SURGERY PROCEDURE UNLIST    
 fistula [facility] MEDICATIONS Current Outpatient Medications Medication Sig Dispense Refill  losartan (COZAAR) 100 mg tablet TAKE 1 TABLET BY MOUTH EVERY DAY 90 Tab 1  
  metoprolol ta-hydrochlorothiaz (LOPRESSOR HCT) 50-25 mg per tablet Take 1 Tab by mouth daily.  atorvastatin (LIPITOR) 40 mg tablet TAKE 1/2 TABLET BY MOUTH DAILY 90 Tab 1  
 metoprolol succinate (TOPROL-XL) 100 mg tablet Take 1 Tab by mouth daily. (Patient taking differently: Take 100 mg by mouth two (2) times a day.) 30 Tab 6  
 FABBY-DARLIN 0.8 mg tab tablet Take 0.8 mg by mouth daily. 11  
 OXYGEN-AIR DELIVERY SYSTEMS Take 3 L by inhalation continuous.  budesonide-formoterol (SYMBICORT) 160-4.5 mcg/actuation HFA inhaler Take 2 Puffs by inhalation two (2) times a day. ALLERGIES Allergies Allergen Reactions  Ace Inhibitors Cough  Aspirin Other (comments) Hallucinations  Hydralazine Other (comments) Hallucinations, dizziness and numbness in legs  Sensipar [Cinacalcet] Unknown (comments) SOCIAL HISTORY Social History Socioeconomic History  Marital status:  Spouse name: Not on file  Number of children: Not on file  Years of education: Not on file  Highest education level: Not on file Tobacco Use  Smoking status: Never Smoker  Smokeless tobacco: Never Used Substance and Sexual Activity  Alcohol use: No  
 Drug use: No  
 Sexual activity: Yes  
  Partners: Male FAMILY HISTORY Family History Problem Relation Age of Onset  Hypertension Mother  Diabetes Mother  No Known Problems Father  No Known Problems Sister  No Known Problems Brother  No Known Problems Other  No Known Problems Brother  No Known Problems Brother  No Known Problems Daughter  No Known Problems Son  No Known Problems Son REVIEW OF SYSTEMS Review of Systems Musculoskeletal: Positive for back pain, falls and myalgias. Bilateral hip/groin pain BLE weakness PHYSICAL EXAMINATION Visit Vitals /55 (BP 1 Location: Right arm, BP Patient Position: Sitting) Pulse (!) 54 Temp 97.8 °F (36.6 °C) (Oral) Resp 16 Ht 4' 8\" (1.422 m) Wt 127 lb 3.2 oz (57.7 kg) SpO2 97% BMI 28.52 kg/m² Pain Assessment  2/11/2020 Location of Pain Back;Leg Location Modifiers Right;Left Severity of Pain 3 Quality of Pain Dull Quality of Pain Comment - Duration of Pain - Duration of Pain Comment - Frequency of Pain Intermittent Aggravating Factors - Limiting Behavior -  
Relieving Factors - Relieving Factors Comment - Result of Injury No  
Work-Related Injury - Type of Injury - Constitutional:  Well developed, well nourished, in no acute distress. Psychiatric: Affect and mood are appropriate. Integumentary: No rashes or abrasions noted on exposed areas. SPINE/MUSCULOSKELETAL EXAM 
 
Cervical spine: 
Neck is midline. Normal muscle tone. No focal atrophy is noted. ROM pain free. Shoulder ROM intact. No tenderness to palpation. Negative Spurling's sign. Negative Tinel's sign. Negative Harper's sign. Sensation in the bilateral arms grossly intact to light touch.  
  
Lumbar spine: No rash, ecchymosis, or gross obliquity. No fasciculations. No focal atrophy is noted. No pain with hip ROM. Full range of motion. No tenderness to palpation. No tenderness to palpation at the sciatic notch. SI joints non-tender (L>R) with weight-bearing; non tender when non-weight bearing. Trochanters non tender. Sensation in the bilateral legs grossly intact to light touch. 
  
Pain in right groin reproduced with internal rotation of the right hip. 
  
Negative CONSTANTINO and P4 on the left. 
  
  
MOTOR:   
   Biceps  Triceps Deltoids Wrist Ext Wrist Flex Hand Intrin Right +4/5 +4/5 +4/5 +4/5 +4/5 +4/5 Left +4/5 +4/5 +4/5 +4/5 +4/5 +4/5  
               
  Hip Flex  Quads Hamstrings Ankle DF EHL Ankle PF  
 Right 4/5 4/5 4/5 4/5 4/5 4/5 Left 4/5 4/5 4/5 4/5 4/5 4/5  
  
DTRs are 1+ biceps, triceps, brachioradialis, patella, and Achilles. 
  
Negative Straight Leg raise. Squat not tested. No difficulty with tandem gait.  
  
Ambulation with single point cane. FWB. Also using wheelchair.  
  
 
RADIOGRAPHS Lumbar MRI images taken on 1/16/2020 personally reviewed with patient: 
Normal lumbar spine vertebral alignment is present without subluxation. A pars 
defect is not seen. 
  
No suspicious osseous marrow lesion is seen. Vertebral body heights are 
preserved. No marrow edema is present.     
  
The conus medullaris is located at the superior L2 level and has a normal 
appearance and signal. No abnormal signal is identified in the visualized distal 
cord. 
  
L1/2 level: There is no central or foraminal stenosis. 
  
L2/3 level: There is no central or foraminal stenosis. 
  
L3/4 level: There is no central or foraminal stenosis. 
  
L4/5 level: Minimal disc bulge is present. Minimal facet arthropathy is seen. There is no central or foraminal stenosis. 
  
L5/S1 level: Minimal disc bulge is present. Very mild asymmetric left-sided 
endplate osteophyte formation is present. Thecal sac is beginning to normally 
taper at this level. There is no central or foraminal stenosis. 
  
Right kidney is not identified. Metallic artifact is present suggesting prior 
nephrectomy. 
  
_______________________ 
  
IMPRESSION IMPRESSION: 
  
1. No high-grade central or foraminal stenosis. 
  
2. No abnormal distal cord or conus signal is seen. 
  
3. Minimal degenerative changes in the lower lumbar spine , as described in 
detail in Findings section. 
  
4. Findings suggesting prior right-sided nephrectomy. 2V Lumbar XR images taken on 12/2/19 personally reviewed with patient: 
Osteopenic appearance -  translucency of vertebrae Generalized degenerative changes Disc space narrowing at L5-S1 Endplate osteophytes 15 minutes of face-to-face contact were spent with the patient during today's visit extensively discussing symptoms and treatment plan. All questions were answered. More than half of this visit today was spent on counseling.   
 
Written by Jody Healy as dictated by Onofre Friend MD

## 2020-03-05 NOTE — TELEPHONE ENCOUNTER
Wallace Montemayor from [facility] is requesting a one visit extension for patient - patient has the flu and will not be able to have today's discharge visit. Please advise Wallace Montemayor with verbal auth at [phone].

## 2020-03-05 NOTE — TELEPHONE ENCOUNTER
I called and spoke to Phani Su with [facility]. She was given the verbal order for the visit extension. The order was RBV'd. No further requests at this time.

## 2020-03-17 NOTE — PROGRESS NOTES
Chief Complaint   Patient presents with    Cough     Ongoing      1. Have you been to the ER, urgent care clinic since your last visit? Hospitalized since your last visit? No    2. Have you seen or consulted any other health care providers outside of the [address] [place] [address] since your last visit? Include any pap smears or colon screening. Yes. Cardiology visit.

## 2020-03-17 NOTE — PROGRESS NOTES
Chief Complaint   Patient presents with    Cough     Ongoing     Follow Up Chronic Condition       Pt is a 66y.o. year old female who presents for follow up of her chronic medical problems  Health Maintenance Due   Topic Date Due    Shingrix Vaccine Age 49> (1 of 2) 12/06/1993    Eye Exam Retinal or Dilated  03/21/2019    GLAUCOMA SCREENING Q2Y  06/22/2019    A1C test (Diabetic or Prediabetic)  03/04/2020       Cardio note reviewed re Aortic stenosis  Date 3/5/2020   Cardiologist Dr. Nancye Rubinstein   Chief Complaint: AORTIC VALVE PROBLEM  Assessment & Plan: Aortic stenosis: stable with no angina, SOB, or syncope Will continue to monitor   HTN: well controlled. On the lower side. No dizziness. Hyperlipidemia: continue statin therapy   ESRD : continue dialysis   Pulmonary HTN: continue dialysis and good BP control  Back in 6 months, sooner if needed      Echo [address] [facility],[address]  Component Name Value Ref Range   EF Echo 61     Result Impression   :   NORMAL LEFT VENTRICULAR CAVITY SIZE. NORMAL GLOBAL LEFT VENTRICULAR SYSTOLIC FUNCTION, EJECTION FRACTION 61%. FLATTENED SEPTUM IN SYSTOLE AND DIASTOLE CONSISTENT WITH RV PRESSURE AND VOLUME OVERLOAD. MILD CONCENTRIC LEFT VENTRICULAR HYPERTROPHY. MILDLY INCREASED RIGHT VENTRICULAR SIZE WITH REDUCED RIGHT VENTRICULAR SYSTOLIC FUNCTION,   TAPSE 1.7 CM. MODERATE AORTIC STENOSIS WITH MILD AORTIC REGURGITATION. PEAK VELOCITY 2.4 M/S, PEAK AND MEAN GRADIENT 23/11 MMHG, VALVE AREA 1.4 CM2. MILD MITRAL REGURGITATION.  MILD TO MODERATE TRICUSPID REGURGITATION. MODERATELY ELEVATED RIGHT VENTRICULAR SYSTOLIC PRESSURE 56 MMHG. COMPARED TO PREVIOUS ECHO PERFORMED ON 6-9-16, MITRAL REGURGITATION WAS MILD TO MODERATE,   RIGHT VENTRICULAR SYSTOLIC PRESSURE WAS 43 MMHG, AORTIC PEAK VELOCITY WAS 2.9 M/S, PEAK AND   MEAN GRADIENT 34/21 MMHG WITH SYSTEMIC BLOOD PRESSURE 180/60.        Last Point of Care HGB A1C  Hemoglobin A1c (POC)   Date Value Ref Range Status   09/04/2019 5.6 % Final    not on meds for DM  5.4% today  Denies polyuria, polydipsia and polyphagia    ESRD on HD    Lab Results   Component Value Date/Time    TSH 5.020 (H) 03/20/2020 10:59 AM   not on Synthroid    Wt Readings from Last 3 Encounters:   03/17/20 128 lb 9.6 oz (58.3 kg)   02/11/20 127 lb 3.2 oz (57.7 kg)   12/11/19 125 lb (56.7 kg)       BP Readings from Last 3 Encounters:   03/17/20 132/53   03/10/20 118/60   02/27/20 121/63    says BP is too low-afraid to give meds  130/50 even without any meds  Sometimes they cannot do dialysis when the BP is too low  meds only on nondialysis days  Will cut back on the dose    COPD-sees Pulmo-has to reschedule appt  Coughing recently (dry), no SOB; right side hurts when she coughs for the last few months now  Has been out of Symbicort  Fell on that side  Now uses a cane on that side to walk    MELANIE on CPAP    Gout?none recent    Neuro-released already; referred to PT an had a lot of progress/able to walk on her own  IMPRESSION:   1. No high-grade central or foraminal stenosis.   2. No abnormal distal cord or conus signal is seen.   3. Minimal degenerative changes in the lower lumbar spine , as described in  detail in Findings section.   4. Findings suggesting prior right-sided nephrectomy. Hx of kidney cancer 18 yrs ago      ROS:    Pt denies: Wt loss, Fever/Chills, HA, Visual changes, Fatigue, Chest pain, SOB, LEBLANC, Abd pain, N/V/D/C, Blood in stool or urine, Edema. Pertinent positive as above in HPI.  All others were negative    Patient Active Problem List   Diagnosis Code    Gout M10.9    Hyperlipidemia E78.5    Tubular adenoma of colon D12.6    Anemia in chronic kidney disease N18.9, D63.1    MELANIE on CPAP G47.33, Z99.89    CHF (congestive heart failure) ([facility]) I50.9    Type 2 diabetes mellitus with hyperglycemia, with long-term current use of insulin ([facility]) E11.65, Z79.4    Advance directive discussed with patient Z70.80  Essential hypertension I10    Hypothyroidism due to acquired atrophy of thyroid E03.4    ESRD (end stage renal disease) on dialysis ([facility]) N18.6, Z99.2    Type 2 diabetes mellitus with nephropathy ([facility]) E11.21    Chronic obstructive pulmonary disease ([facility]) J44.9       Past Medical History:   Diagnosis Date    Anemia in CKD (chronic kidney disease)     Aortic stenosis     Asthma     Calculus of kidney     Cancer ([facility]     right kidney    Cardiomyopathy ([facility] [facility]     EF 20-25% (10/16), 55% (06/16)    Chronic kidney disease     dialysis MWF    Chronic kidney disease, stage V ([facility]     Diabetes ([facility]     Dialysis patient ([facility]     Dialysis patient ([facility]     M-W-F; LEFT AVF; PERM CATH RIGHT CHEST    Gastrointestinal hemorrhage associated with peptic ulcer     Gastrointestinal hemorrhage associated with peptic ulcer 10/5/2016    Gout     Hypercholesterolemia     Hypertension     Menopause     Primary hyperparathyroidism ([facility] 2010    s/p parathyroid adenoma excision    Secondary hyperparathyroidism (of renal origin)     Sleep apnea     C PAP    Status post nephrectomy     right    Thyroid disease        Current Outpatient Medications   Medication Sig Dispense Refill    budesonide-formoteroL (Symbicort) 160-4.5 mcg/actuation HFAA Take 2 Puffs by inhalation two (2) times a day. 1 Inhaler 3    losartan (COZAAR) 50 mg tablet Take 1 Tab by mouth daily. 90 Tab 1    metoprolol succinate (TOPROL-XL) 50 mg XL tablet Take 1 Tab by mouth daily. 90 Tab 1    albuterol (VENTOLIN HFA) 90 mcg/actuation inhaler as needed.  atorvastatin (LIPITOR) 40 mg tablet TAKE 1/2 TABLET BY MOUTH DAILY (Patient taking differently: Take 20 mg by mouth daily.) 90 Tab 1    FABBY-DARLIN 0.8 mg tab tablet Take 0.8 mg by mouth daily. 11    OXYGEN-AIR DELIVERY SYSTEMS Take 3 L by inhalation continuous.          Social History     Tobacco Use   Smoking Status Never Smoker   Smokeless Tobacco Never Used       Allergies Allergen Reactions    Ace Inhibitors Cough    Aspirin Other (comments)     Hallucinations    Hydralazine Other (comments)     Hallucinations, dizziness and numbness in legs     Sensipar [Cinacalcet] Unknown (comments)       Patient Labs were reviewed: yes      Patient Past Records were reviewed:  yes        Objective:     Vitals:    03/17/20 0942   BP: 132/53   Pulse: 69   Resp: 22   Temp: 97.7 °F (36.5 °C)   TempSrc: Oral   SpO2: 95%   Weight: 128 lb 9.6 oz (58.3 kg)   Height: 4' 8\" (1.422 m)     Body mass index is 28.83 kg/m². Exam:   Appearance: alert, well appearing,  oriented to person, place, and time, acyanotic, in no respiratory distress and well hydrated. HEENT:  NC/AT, pink conj, anicteric sclerae  Neck:  No cervical lymphadenopathy, no JVD, no thyromegaly, no carotid bruit  Heart: grade 3/6 HSM at the aortic area  Lungs:  CTAB, no rhonchi, rales, or wheezes with dec air exchange   Abdomen:  Non-tender, pos bowel sounds, no hepatosplenomegaly  Ext:  No C/C/E    Skin: no rash  Neuro: no lateralizing signs, CNs II-XII intact         Assessment/ Plan:   Diagnoses and all orders for this visit:    1. Essential hypertension-BPs on the low side so I cut the dose of her BP meds to half carroll with the AS  -     losartan (COZAAR) 50 mg tablet; Take 1 Tab by mouth daily. -     metoprolol succinate (TOPROL-XL) 50 mg XL tablet; Take 1 Tab by mouth daily. 2. Type 2 diabetes with nephropathy (HCC)-controlled off meds  -     AMB POC HEMOGLOBIN A1C    3. ESRD (end stage renal disease) on dialysis ([facility]    4. Hypothyroidism due to acquired atrophy of thyroid-will continue to monitor TSH  -     TSH 3RD GENERATION; Future  Check anti TPO next time    5. Chronic obstructive pulmonary disease, unspecified COPD type (HCC)-Pulmo following  -     budesonide-formoteroL (Symbicort) 160-4.5 mcg/actuation HFAA; Take 2 Puffs by inhalation two (2) times a day. -     XR CHEST PA LAT; Future    6. MELANIE on CPAP-compliant    7. Nonrheumatic aortic valve stenosis-mod, asymptomatic; Cardio following    8. Cough-restart Symbicort and resched appt with Pulmo  -     XR CHEST PA LAT; Future    9. Hyperlipidemia, unspecified hyperlipidemia type-continue with Lipitor  -     LIPID PANEL; Future    10. Idiopathic gout, unspecified chronicity, unspecified site  -     URIC ACID; Future    11. Congestive heart failure, unspecified HF chronicity, unspecified heart failure type (HCC)-echo is recent; mild    12. PVD (peripheral vascular disease) (HCC)-Vasc following    13. Overweight (BMI 25.0-29.9)discussed limiting abi to 6515-2881/day and exercising at least 150 min a week        Follow-up and Dispositions    · Return in about 3 months (around 6/17/2020) for follow up. I have discussed the diagnosis with the patient and the intended plan as seen in the above orders. The patient has received an After-Visit Summary and questions were answered concerning future plans. Medication Side Effects and Warnings were discussed with patient: yes    Patient verbalized understanding of above instructions.     Kenia Harrison MD  Internal Medicine  [facility]

## 2020-06-23 NOTE — PROGRESS NOTES
[address] 
[address], [address] [place], [address] [place] [address] Street Phone: [phone] Fax: [phone] Taylor Hidalgo : 1945 PCP: Daron Jeffery MD 
2020 PROGRESS NOTE HISTORY OF PRESENT ILLNESS Ju Luis is a 66 y.o. female who was seen as a new patient 17 with c/o low back pain radiating into the BLE with knee buckling. She noted that her legs and feet \"do not listen to me. \" She has been seen by Dr. Franky Pearson for low back pain and unsteadiness causing falls. She has attended PT (8/15/19-19; K[facility] Beverly). Pt reported pronation of her feet with ambulation, especially with walking down stairs, that she believed contributed to her falls. Pt c/o episodic bilateral groin pain. PMHx: MELANIE, hypothyroidism, COPD, CHF, ESRD on dialysis M,W,F and DM. Pt's  noted that since she has been undergoing dialysis, she is no longer diabetic. She has been undergoing dialysis x 3 years.  She saw some improvement with Home Health PT and she was able to get around the house better and use an exercise bike for exercise. Her  noted that she is now able to ambulate around the house and go up the stairs by herself. Lumbar spine MRI dated 2020 reviewed. Per report, No high-grade central or foraminal stenosis. No abnormal distal cord or conus signal is seen. Minimal degenerative changes in the lower lumbar spine , as described in detail in Findings section. Findings suggesting prior right-sided nephrectomy. Per my reading, there is significant muscular atrophy. Ju Luis comes in to the office today for f/u. She found improvement with Home Health PT, and she has been ambulating better. She no longer experiences leg buckling. She has been maintaining a daily HEP. She continues to have some mild residual back pain. Pain Score: 4/10. PmHx: MELANIE, hypothyroidism, COPD, CHF, ESRD on dialysis M,W,F and DM. ASSESSMENT There is no apparent spinal pathology to explain her bilateral leg weakness. It is likely due to deconditioning. She has seen improvement of her mobility and ambulation with home health PT. PLAN 1. Referral to PT with postural restoration. Pt will f/u in 8 weeks or sooner as needed. Diagnoses and all orders for this visit: 
 
1. Myofascial pain 
-     REFERRAL TO PHYSICAL THERAPY 2. Mechanical low back pain 
-     REFERRAL TO PHYSICAL THERAPY 3. Muscular deconditioning 
-     REFERRAL TO PHYSICAL THERAPY PAST MEDICAL HISTORY Past Medical History:  
Diagnosis Date  Anemia in CKD (chronic kidney disease)  Aortic stenosis  Asthma  Calculus of kidney  Cancer ([facility] [address] right kidney  Cardiomyopathy ([facility] [address] EF 20-25% (10/16), 55% (06/16)  Chronic kidney disease   
 dialysis MWF  Chronic kidney disease, stage V ([address]  Diabetes ([facility] [address]  Dialysis patient [facility])  Dialysis patient [facility]) M-W-F; LEFT AVF; PERM CATH RIGHT CHEST  Gastrointestinal hemorrhage associated with peptic ulcer  Gastrointestinal hemorrhage associated with peptic ulcer 10/5/2016  Gout  Hypercholesterolemia  Hypertension  Menopause  Primary hyperparathyroidism ([address] 2010  
 s/p parathyroid adenoma excision  Secondary hyperparathyroidism (of renal origin)  Sleep apnea C PAP  Status post nephrectomy   
 right  Thyroid disease Past Surgical History:  
Procedure Laterality Date  COLONOSCOPY  08/28/2015 Repeat 5 years  HX GYN    
 C-sections x 3  
 HX HEENT    
 total thyroidectomy  HX NEPHRECTOMY    
 right  HX THYROIDECTOMY  HX VASCULAR ACCESS    
 dialysis catheter R upper chest  
 STEVIE STEREO  BX BREAST LT 1ST LESION W/CLIP AND SPECIMEN Left 10/31/2017  VASCULAR SURGERY PROCEDURE UNLIST    
 fistula LIOR Beltran MEDICATIONS Current Outpatient Medications Medication Sig Dispense Refill  atorvastatin (LIPITOR) 40 mg tablet TAKE 1/2 TABLET BY MOUTH DAILY 90 Tab 1  
 budesonide-formoteroL (Symbicort) 160-4.5 mcg/actuation HFAA Take 2 Puffs by inhalation two (2) times a day. 1 Inhaler 3  
 losartan (COZAAR) 50 mg tablet Take 1 Tab by mouth daily. 90 Tab 1  
 metoprolol succinate (TOPROL-XL) 50 mg XL tablet Take 1 Tab by mouth daily. 90 Tab 1  
 albuterol (VENTOLIN HFA) 90 mcg/actuation inhaler as needed.  FABBY-DARLIN 0.8 mg tab tablet Take 0.8 mg by mouth daily. 11  
 OXYGEN-AIR DELIVERY SYSTEMS Take 3 L by inhalation continuous. ALLERGIES Allergies Allergen Reactions  Ace Inhibitors Cough  Aspirin Other (comments) Hallucinations  Hydralazine Other (comments) Hallucinations, dizziness and numbness in legs  Sensipar [Cinacalcet] Unknown (comments) SOCIAL HISTORY Social History Socioeconomic History  Marital status:  Spouse name: Not on file  Number of children: Not on file  Years of education: Not on file  Highest education level: Not on file Tobacco Use  Smoking status: Never Smoker  Smokeless tobacco: Never Used Substance and Sexual Activity  Alcohol use: No  
 Drug use: No  
 Sexual activity: Yes  
  Partners: Male FAMILY HISTORY Family History Problem Relation Age of Onset  Hypertension Mother  Diabetes Mother  No Known Problems Father  No Known Problems Sister  No Known Problems Brother  No Known Problems Other  No Known Problems Brother  No Known Problems Brother  No Known Problems Daughter  No Known Problems Son  No Known Problems Son REVIEW OF SYSTEMS Review of Systems Musculoskeletal: Positive for back pain. PHYSICAL EXAMINATION Visit Vitals /62 (BP 1 Location: Right arm, BP Patient Position: Sitting) Pulse 84 Temp 97.4 °F (36.3 °C) (Oral) Resp 20 Ht 4' 8\" (1.422 m) Wt 135 lb (61.2 kg) SpO2 97% Comment: RA  
BMI 30.27 kg/m² Pain Assessment  6/23/2020 Location of Pain Back Location Modifiers (No Data) Severity of Pain 4 Quality of Pain Other (Comment) Quality of Pain Comment \"just hurts\" Duration of Pain Persistent Duration of Pain Comment - Frequency of Pain Intermittent Aggravating Factors Other (Comment) Aggravating Factors Comment getting out of bed, laying on side Limiting Behavior Some Relieving Factors Other (Comment) Relieving Factors Comment pain medication Result of Injury No  
Work-Related Injury - Type of Injury - Constitutional:  Well developed, well nourished, in no acute distress. Psychiatric: Affect and mood are appropriate. Integumentary: No rashes or abrasions noted on exposed areas. SPINE/MUSCULOSKELETAL EXAM 
 
Cervical spine: 
Neck is midline. Normal muscle tone. No focal atrophy is noted. ROM pain free. Shoulder ROM intact.  
No tenderness to palpation. Negative Spurling's sign. Negative Tinel's sign. Negative Harper's sign.  
  
Sensation in the bilateral arms grossly intact to light touch.  
  
Lumbar spine: No rash, ecchymosis, or gross obliquity. No fasciculations. No focal atrophy is noted. No pain with hip ROM. Full range of motion. No tenderness to palpation. No tenderness to palpation at the sciatic notch. SI joints non-tender (L>R) with weight-bearing; non tender when non-weight bearing. Trochanters non tender. 
  
Sensation in the bilateral legs grossly intact to light touch. 
  
Pain in right groin reproduced with internal rotation of the right hip. 
  
Negative CONSTANTINO and P4 on the left. Updates 6/23/2020: 
Tenderness to palpation of thoracolumbar regions (R>L) Pain with extension = flexion Shoulder protraction 
  
  
MOTOR:   
   Biceps  Triceps Deltoids Wrist Ext Wrist Flex Hand Intrin Right +4/5 +4/5 +4/5 +4/5 +4/5 +4/5 Left +4/5 +4/5 +4/5 +4/5 +4/5 +4/5  
               
  Hip Flex  Quads Hamstrings Ankle DF EHL Ankle PF Right 4/5 4/5 4/5 4/5 4/5 4/5 Left 4/5 4/5 4/5 4/5 4/5 4/5  
  
DTRs are 1+ biceps, triceps, brachioradialis, patella, and Achilles. 
  
Negative Straight Leg raise. Squat not tested. No difficulty with tandem gait.  
  
Ambulation with single point cane. FWB. Also using wheelchair.  
  
 
RADIOGRAPHS Lumbar MRI images taken on 1/16/2020 personally reviewed with patient: 
Normal lumbar spine vertebral alignment is present without subluxation.  A pars 
defect is not seen. 
  
No suspicious osseous marrow lesion is seen.  Vertebral body heights are 
preserved. No marrow edema is present.     
  
The conus medullaris is located at the superior L2 level and has a normal 
appearance and signal. No abnormal signal is identified in the visualized distal 
cord. 
  
L1/2 level: There is no central or foraminal stenosis. 
  
L2/3 level: There is no central or foraminal stenosis. 
  
L3/4 level: There is no central or foraminal stenosis. 
  
L4/5 level: Minimal disc bulge is present. Minimal facet arthropathy is seen. There is no central or foraminal stenosis. 
  
L5/S1 level: Minimal disc bulge is present. Very mild asymmetric left-sided 
endplate osteophyte formation is present. Thecal sac is beginning to normally 
taper at this level. There is no central or foraminal stenosis. 
  
Right kidney is not identified. Metallic artifact is present suggesting prior 
nephrectomy. 
  
_______________________ 
  
IMPRESSION IMPRESSION: 
  
1. No high-grade central or foraminal stenosis. 
  
2. No abnormal distal cord or conus signal is seen. 
  
3. Minimal degenerative changes in the lower lumbar spine , as described in 
detail in Findings section. 
  
4. Findings suggesting prior right-sided nephrectomy. 2V Lumbar XR images taken on 12/2/19 personally reviewed with patient: Osteopenic appearance -  translucency of vertebrae Generalized degenerative changes Disc space narrowing at L5-S1 
 
15 minutes of face-to-face contact were spent with the patient during today's visit extensively discussing symptoms and treatment plan. All questions were answered. More than half of this visit today was spent on counseling.   
 
Written by Alirio Evangelista as dictated by Rodrigo Garcia MD

## 2020-06-23 NOTE — PROGRESS NOTES
Juan Carlin presents today for Chief Complaint Patient presents with  Back Pain Is someone accompanying this pt? Yes, male Is the patient using any DME equipment during OV? Yes, cane Depression Screening: 
3 most recent PHQ Screens 10/28/2019 Little interest or pleasure in doing things Not at all Feeling down, depressed, irritable, or hopeless Not at all Total Score PHQ 2 0 Learning Assessment: 
Learning Assessment 8/18/2015 PRIMARY LEARNER Patient PRIMARY LANGUAGE ENGLISH  
LEARNER PREFERENCE PRIMARY DEMONSTRATION  
  -  
  -  
ANSWERED BY patient RELATIONSHIP SELF Abuse Screening: 
Abuse Screening Questionnaire 3/22/2017 Do you ever feel afraid of your partner? [place] Are you in a relationship with someone who physically or mentally threatens you? [place] Is it safe for you to go home? [facility] Fall Risk Fall Risk Assessment, last 12 mths 2/11/2020 Able to walk? Yes Fall in past 12 months? No  
Fall with injury? -  
Number of falls in past 12 months - Fall Risk Score - Coordination of Care: 1. Have you been to the ER, urgent care clinic since your last visit? no  Hospitalized since your last visit? no 
 
2. Have you seen or consulted any other health care providers outside of the [address] [place] [address] since your last visit? no Include any pap smears or colon screening.  no

## 2020-07-16 NOTE — PROGRESS NOTES
PHYSICAL THERAPY - DAILY TREATMENT NOTE    Patient Name: Ken Garzon        Date: 2020  : 1945   YES Patient  Verified  Visit #:   1   of   8  Insurance: Payor: Chel Stephen / Plan: [address] [place] [address] HMO / Product Type: Managed Care Medicare /      In time: 1:03 Out time: 1:43   Total Treatment Time: 40     Medicare Time Tracking (below)   Total Timed Codes (min):  10 1:1 Treatment Time:  10     TREATMENT AREA =  LBP    SUBJECTIVE    Pain Level (on 0 to 10 scale):  4  / 10   Medication Changes/New allergies or changes in medical history, any new surgeries or procedures? NO    If yes, update Summary List   Subjective Functional Status/Changes:  []  No changes reported     Patient reports she continued her HEP after stopping therapy in 2019. States she also had home health PT for back pain. She was able to stop using rollator ~ 3 months ago. States she began having pain in her right mid and low back 1-2 months. States this pain is intermittent. States negotiating steps aggravates her back pain. She uses a SPC intermittently depending on back pain. HEP includes: theraband rows/ext, seated cycler  She denies any radicular symptoms into either LE. She also reports slouching in chair increases pain. She states she thinks x rays have been taken. Goal\" \"get rid pain. \"       OBJECTIVE  LOW BACK EVALUATION      Previous Treatment: previous PT    PMHx:see chart    Social/Recreational/Work:    Objective:    Gait:no assistive device, trunk flexion    Posture:trunk flexion    Palpation/Sensation:no c/o tenderness with palpation    (N - normal; R - reduced; MR - markedly reduced)       L/S ROM      Range   Effect  Strength (MMT)          Right        Left    Flex 50% decreased pain Psoas (L2,3) 3+ 3+   Ext 25% aboished pain  Quads (L3) 4 4-   R-lat flex 50% increased pain Ant tib(L4) 4 4   L-lat flex 50%  increased pain Hip IR 3 3   R-rot   Hip ER 3 3   L-rot   Glut med (L5) 3 3      Hamstrings(S1,S2) 4- 4-     Strength (MMT)       Right     Left          Gastroc (S1,S2)     Glut Max (S1,S2) 3 3        Core: Sup Bridge fair fair   Core: Side Bridge     Core: Prone plank            Special Tests                       Right     Left          Flexibility         Right              Left    Slump   90/90 HS     SLR   [place]uel [place]     Sl screen 3/5=70% LR   Laura     Gaenslen test   Hip IR (prone)     Jonathan/CONSTANTINO sign   Hip ER (prone)     Thigh thrust        Distraction        Lateral Compression                  Effect of:  DKC    Supine Bridge    SL Supine Bridge    Prone on Elbows    RFIS decreased pain   OLIVA standing abolished           10 min Therapeutic Exercise:  [x]  See flow sheet   Rationale:      increase ROM to improve the patients ability to perform ADL's, gait, and functional mobility with decreased pain.       min Patient Education:  YES  Reviewed HEP   []  Progressed/Changed HEP based on:   Issued HEP per handout     Other Objective/Functional Measures:    See eval     Post Treatment Pain Level (on 0 to 10) scale:   0  / 10     ASSESSMENT  Assessment/Changes in Function:     Justification for Eval Code Complexity:  Patient History (low 0, mod 1-2, high 3-4): high (s/p thyroidectomy, hypercholesterolemia, gout, sleep apnea, s/p partial right nephrectomy)  Examination (low 1-2, mod 3+, high 4+): mod (see above)  Clinical Presentation (low stable or uncomplicated, mod evolving or changing, high unstable or unpredictable): mod  Clinical Decision Making (low , mod 26-74, high 1-25): FOTO = 52/100 mod     []  See Progress Note/Recertification   Patient will continue to benefit from skilled PT services to modify and progress therapeutic interventions, address functional mobility deficits, address ROM deficits, address strength deficits, analyze and address soft tissue restrictions, analyze and cue movement patterns, analyze and modify body mechanics/ergonomics, assess and modify postural abnormalities and instruct in home and community integration to attain remaining goals. Progress toward goals / Updated goals:    Goals established.       PLAN    [x]  Upgrade activities as tolerated YES Continue plan of care   []  Discharge due to :    []  Other:      Therapist: Vazquez Keenan, PT    Date: 7/16/2020 Time: 1:14 PM     Future Appointments   Date Time Provider Marla Pollard   8/20/2020  9:00 AM Tam Gauthier  [address] [place] [address]

## 2020-07-16 NOTE — PROGRESS NOTES
[address]  [facility] PHYSICAL THERAPY  [address] [facility] [address] [facility] [place], [address] - Phone: [phone]  Fax: [phone] / [address] [facility]  Patient Name: Zane Marroquin : 1945   Medical   Diagnosis: Low back pain [M54.5] Treatment Diagnosis: Low back pain, myofascial pain, deconditioning   Onset Date: Chronic with recent episode beginning 1-2 months ago     Referral Source: Jd Briseno MD [facility]): 2020   Prior Hospitalization: See medical history Provider #: [id number]   Prior Level of Function: Ambulating with rollator during previous PT in 2019   Comorbidities: s/p thyroidectomy, hypercholesterolemia, gout, sleep apnea, s/p partial right nephrectomy   Medications: Verified on Patient Summary List   The Plan of Care and following information is based on the information from the initial evaluation.   ===========================================================================================  Assessment / key information:  Patient is a 66y.o. year old female with chief complaint of recently increased back pain. Patient reports after being discharged from PT in 2019, she continued to do her HEP. States she also had an episode of home health PT. States with those exercises she was able to decrease her pain and progress to gait without an assistive device. However, she began having increased back pain 1-2 months ago. She states this pain is more in her back, than her previous pain. Objective findings: 1) decreased trunk AROM in all directions, 2) pain abolishes with repeated trunk extension, 3) decreased strength in B LE's 4) flexed posture. Patient will benefit from skilled physical therapy services to address these issues.   Thank you for this referral.     Lyssa Alvarez (Focused on Therapeutic Outcomes) Functional Status score = 52/100, which corresponds to a functional limitation of 48%.    L/S ROM      Range   Effect  Strength (MMT)          Right        Left    Flex 50% decreased pain Psoas (L2,3) 3+ 3+   Ext 25% aboished pain  Quads (L3) 4 4-   R-lat flex 50% increased pain Ant tib(L4) 4 4   L-lat flex 50%  increased pain Hip IR 3 3   R-rot     Hip ER 3 3   L-rot     Glut med (L5) 3 3         Hamstrings(S1,S2) 4- 4-      Strength (MMT)       Right     Left          Gastroc (S1,S2)       Glut Max (S1,S2) 3 3           Core: Sup Bridge fair fair       ===========================================================================================  Eval Complexity: History: HIGH Complexity :3+ comorbidities / personal factors will impact the outcome/ POC Exam:MEDIUM Complexity : 3 Standardized tests and measures addressing body structure, function, activity limitation and / or participation in recreation  Presentation: MEDIUM Complexity : Evolving with changing characteristics  Clinical Decision Making:MEDIUM Complexity : FOTO score of 26-74Overall Complexity:MEDIUM    Problem List: pain affecting function, decrease ROM, decrease strength, impaired gait/ balance, decrease ADL/ functional abilitiies, decrease activity tolerance, decrease flexibility/ joint mobility and decrease transfer abilities   Treatment Plan may include any combination of the following: Therapeutic exercise, Therapeutic activities, Neuromuscular re-education, Physical agent/modality, Gait/balance training, Manual therapy and Patient education  Patient / Family readiness to learn indicated by: asking questions, trying to perform skills and interest  Persons(s) to be included in education: patient (P)  Barriers to Learning/Limitations: None  Measures taken:    Patient Goal (s): \"lessen pain or ability to deal with it more than now\"   Patient self reported health status: good  Rehabilitation Potential: good   Short Term Goals: To be accomplished in  2  weeks:  1.   Patient will demonstrate ability to abolish low back pain with repeated trunk extension exercises. 2.  Patient will be compliant with home exercise program.     Long Term Goals: To be accomplished in  4  weeks:  1. Patient will increase FOTO Functional Status score to 62/100 to indicate decreased functional limitations. 2.  Patient will demonstrate ability to perform 10 reps of supine bridge with good form to indicate improved core stability needed for ADL's.  3.  Patient will be independent with home exercise program for self management of symptoms. Frequency / Duration:   Patient to be seen  2-3  times per week for 4-6  weeks:  Patient / Caregiver education and instruction: self care and exercises    Therapist Signature: Aysha Lester, PT Date: 7/74/7455   Certification Period: 7/16/2020 - 10/15/2020 Time: 5:03 PM   ===========================================================================================  I certify that the above Physical Therapy Services are being furnished while the patient is under my care. I agree with the treatment plan and certify that this therapy is necessary. Physician Signature:        Date:       Time:     Please sign and return to [employer] or you may fax the signed copy to [phone]. Thank you.

## 2020-08-18 NOTE — PROGRESS NOTES
PHYSICAL THERAPY - DAILY TREATMENT NOTE Patient Name: Bolling Denver        Date: 2020 : 1945   yes Patient  Verified Visit #:   2   of     Insurance: Payor: Radha Rosas / Plan: [address] [place] [address] HMO / Product Type: Managed Care Medicare / In time: 147 Out time: 230 Total Treatment Time: 37 Medicare/BCBS Time Tracking (below) Total Timed Codes (min):  43 1:1 Treatment Time:  37 TREATMENT AREA =  Low back pain [M54.5] SUBJECTIVE Pain Level (on 0 to 10 scale):  3  / 10 Medication Changes/New allergies or changes in medical history, any new surgeries or procedures?    no  If yes, update Summary List  
Subjective Functional Status/Changes:  []  No changes reported \"I am not too bad today. A little stiff\" OBJECTIVE Modalities Rationale: PD 
 
33 min Therapeutic Exercise:  [x]  See flow sheet Rationale:      increase ROM and increase strength to improve the patients ability to safely perform ADLs/ bending/stooping/ lifting/prolong sitting, stding and amb/ stairs with minimal to no pain 10 min Therapeutic Activity: [x]  See flow sheet 
postural training 
log roll>sup>sit Rationale:    increase ROM, increase strength and improve coordination to improve the patients ability to safely perform ADLs/ bending/stooping/ lifting/prolong sitting, stding and amb/ stairs with minimal to no pain Billed With/As: 
 [x] TE 
 [x] TA 
 [] Neuro 
 [] Self Care Patient Education: [x] Review HEP [] Progressed/Changed HEP based on:  
[x] positioning   [x] body mechanics   [x] transfers   [] heat/ice application   
[x] other: Pt ed on importance and benefits of compliance with HEP, core strength/stability and proper posture; pt verbalized understanding Other Objective/Functional Measures: 
VCs + demo to perform proper technique for TE Initiated TE per flowsheet without c/o P! JUSTICE-P/B reduces pain Reviewed proper bed mobility, sleeping positions, and importance and benefits of a neutral spine Post Treatment Pain Level (on 0 to 10) scale:   0  / 10 ASSESSMENT Assessment/Changes in Function:  
Progressed there-ex without c/o increase p! 
demos poor posture; improved with VCs 
pt able to demo proper log roll> sup>sit []  See Progress Note/Recertification Patient will continue to benefit from skilled PT services to modify and progress therapeutic interventions, address functional mobility deficits, address ROM deficits, address strength deficits, analyze and address soft tissue restrictions, analyze and cue movement patterns, analyze and modify body mechanics/ergonomics, assess and modify postural abnormalities and instruct in home and community integration to attain remaining goals. Progress toward goals / Updated goals: 
 
Pt's first visit since IE, no noted progress PLAN [x]  Upgrade activities as tolerated yes Continue plan of care  
[]  Discharge due to :   
[]  Other:   
 
Therapist: Robina Perdomo PTA Date: 8/18/2020 Time: 2:26 PM  
 
Future Appointments Date Time Provider Marla Latrice 8/20/2020  9:00 AM Wan Perez  [address]  
8/20/2020  1:45 PM Harsha Mitchell, [place] [facility]  
8/25/2020  3:15 PM Nancy Andrade, PT [facility]  
8/27/2020  3:15 PM Felicita Dalal, PT [facility]

## 2020-08-20 NOTE — PROGRESS NOTES
PHYSICAL THERAPY - DAILY TREATMENT NOTE Patient Name: Karthik Tamayo        Date: 2020 : 1945   yes Patient  Verified Visit #:   3   of     Insurance: Payor: Elisa Michele / Plan: [address] [place] [address] HMO / Product Type: Managed Care Medicare / In time: 146 Out time: 219 Total Treatment Time: 35 Medicare/BCBS Time Tracking (below) Total Timed Codes (min):  33 1:1 Treatment Time:  35 TREATMENT AREA =  Low back pain [M54.5] SUBJECTIVE Pain Level (on 0 to 10 scale):  0  / 10 Medication Changes/New allergies or changes in medical history, any new surgeries or procedures?    no  If yes, update Summary List  
Subjective Functional Status/Changes:  []  No changes reported \"I feel good today, it helped for the rest of the day last visit\" OBJECTIVE Modalities Rationale: PD 
 
24 min Therapeutic Exercise:  [x]  See flow sheet Rationale:      increase ROM and increase strength to improve the patients ability to safely perform ADLs/ bending/stooping/ lifting/prolong sitting, stding and amb/ stairs with minimal to no pain 
 
 
9 min Therapeutic Activity: [x]  See flow sheet 
postural training 
log roll>sup>sit 
sit <>std without UE Rationale:    increase ROM, increase strength and improve coordination to improve the patients ability to safely perform ADLs/ bending/stooping/ lifting/prolong sitting, stding and amb/ stairs with minimal to no pain Billed With/As: 
 [x] TE 
 [x] TA 
 [] Neuro 
 [] Self Care Patient Education: [x] Review HEP [] Progressed/Changed HEP based on:  
[x] positioning   [x] body mechanics   [x] transfers   [] heat/ice application   
[x] other: Pt ed on importance and benefits of compliance with HEP, core strength/stability and proper posture; pt verbalized understanding Other Objective/Functional Measures: 
VCs + demo to perform proper technique for TE Initiated sit <>std, and clam  without c/o p! VCs to increase MIL[place] with sit <>std without UE 
c/o decrease hip strength with seated hip IR/ER Post Treatment Pain Level (on 0 to 10) scale:   0  / 10 ASSESSMENT Assessment/Changes in Function:  
Progressed there-ex without c/o increase p! 
demos improved posture with seated TE without VCs 
(I) with log roll> sup>sit []  See Progress Note/Recertification Patient will continue to benefit from skilled PT services to modify and progress therapeutic interventions, address functional mobility deficits, address ROM deficits, address strength deficits, analyze and address soft tissue restrictions, analyze and cue movement patterns, analyze and modify body mechanics/ergonomics, assess and modify postural abnormalities and instruct in home and community integration to attain remaining goals. Progress toward goals / Updated goals: 1. Patient will demonstrate ability to abolish low back pain with repeated trunk extension exercises. 2.  Patient will be compliant with home exercise program.Established HEP Long Term Goals: To be accomplished in  4  weeks: 1. Patient will increase FOTO Functional Status score to 62/100 to indicate decreased functional limitations. 2.  Patient will demonstrate ability to perform 10 reps of supine bridge with good form to indicate improved core stability needed for ADL's. 
3.  Patient will be independent with home exercise program for self management of symptoms. PLAN [x]  Upgrade activities as tolerated yes Continue plan of care  
[]  Discharge due to :   
[]  Other:   
 
Therapist: Riccardo Ugalde PTA Date: 8/20/2020 Time: 2:38 PM  
 
Future Appointments Date Time Provider Marla Pollard 8/25/2020  3:15 PM Dano Penaloza, PT [facility]  
8/27/2020  3:15 PM Gi Mazariegos, PT [facility]  
9/1/2020  9:15 AM Nell Aguirre  [address]

## 2020-08-25 NOTE — PROGRESS NOTES
PHYSICAL THERAPY - DAILY TREATMENT NOTE Patient Name: Joshua Flores        Date: 2020 : 1945   YES Patient  Verified Visit #:   4     Insurance: Payor: Mary Anne Edmond / Plan: [address] [place] [address] HMO / Product Type: Managed Care Medicare / In time: 3:12 Out time: 3:50 Total Treatment Time: 40 Medicare/BCBS Time Tracking (below) Total Timed Codes (min):  38 1:1 Treatment Time:  45 TREATMENT AREA = Low back pain [M54.5] SUBJECTIVE Pain Level (on 0 to 10 scale):  1-2  / 10 Medication Changes/New allergies or changes in medical history, any new surgeries or procedures? NO    If yes, update Summary List  
Subjective Functional Status/Changes:  []  No changes reported \"I only have a little bit of pain. \"  
 
  
 
OBJECTIVE Therapeutic Procedures: 
Min Procedure Specifics + Rationale  
n/a [x]  Patient Education (performed throughout session) [x] Review HEP [] Progressed/Changed HEP based on:  
[] proper performance and advancement of Therex/TA [] reduction in pain level   
[] increased functional capacity [] change in directional preference  
38(38) [x] Therapeutic Exercise [x]  See Flowsheet Rationale: increase ROM and increase strength to improve the patients ability to participate in ADL's Other Objective/Functional Measures: Added new therex per flow sheet Added Pallof 1 and sidestepping to HEP. Informed patient to not wrap band around LE with sidestepping. Post Treatment Pain Level (on 0 to 10) scale:   0  / 10 ASSESSMENT Assessment/Changes in Function:  
 
 
Responding well to therex as patient reports reduction in pain/discomfort with treatment progression.    
  
  
Patient will continue to benefit from skilled PT services to modify and progress therapeutic interventions, address functional mobility deficits, address ROM deficits, address strength deficits, analyze and address soft tissue restrictions, analyze and cue movement patterns, analyze and modify body mechanics/ergonomics and instruct in home and community integration  to attain remaining goals Progress toward goals / Updated goals: 
 
Compliant towards HEP  
 
PLAN [x]  Upgrade activities as tolerated 
[x]  Update interventions per flow sheet YES Continue plan of care  
[]  Discharge due to :   
[]  Other:   
 
Therapist: Renee Celis \"BJ\" Marcellus, DPT, Cert. MDT, Cert. DN, Cert. SMT, Dip. Osteopractic Date: 8/25/2020 Time: 3:25 PM  
 
Future Appointments Date Time Provider Marla Pollard 8/27/2020  3:15 PM Dianne Munoz PT [facility]  
9/1/2020  9:15 AM Clarice Nassar  [address]  
9/3/2020  1:15 PM Dianne Munoz PT [facility]  
9/8/2020  2:00 PM Halley Luis [place] [facility]  
9/10/2020  1:15 PM Dianne Munoz PT [facility]  
9/15/2020  1:15 PM Halley Luis [facility]  
9/17/2020  1:15 PM Timothy Dalal, PT [facility]

## 2020-08-27 NOTE — PROGRESS NOTES
PHYSICAL THERAPY - DAILY TREATMENT NOTE Patient Name: Desire Mckinley        Date: 2020 : 1945   YES Patient  Verified Visit #:   5   of   8  Insurance: Payor: Michael Nelson / Plan: [address] [place] [address] HMO / Product Type: Managed Care Medicare / In time: 1:45 Out time: 2:35 Total Treatment Time: 50 Medicare/BCBS [place] Time Tracking (below) Total Timed Codes (min): 50 1:1 Treatment Time:  50 TREATMENT AREA =  LBP 
 
SUBJECTIVE Pain Level (on 0 to 10 scale):  2  / 10 Medication Changes/New allergies or changes in medical history, any new surgeries or procedures? NO    If yes, update Summary List  
Subjective Functional Status/Changes:  []  No changes reported \"Im really tired today. They took my BP at dialysis yesterday and it was too tight so it hurts on right arm some\" OBJECTIVE 35 min Therapeutic Exercise:  [x]  See flow sheet Rationale:      increase ROM and increase strength to improve the patients ability to amb and perform ADLs with les mckenzie 20 min Therapeutic Activity: Side steps, pallof, sit<>stands Rationale:   Increase ease with amb and transfers 
 min Patient Education:  Sylvain Upton []  Progressed/Changed HEP based on:   Cont HEP Other Objective/Functional Measures: 
Pt [place] during session today, required mod to max VC 
VC for upright posture in sitting and standing Challenged with rolling in bed, difficulty placing UEs in place Post Treatment Pain Level (on 0 to 10) scale:   0  / 10 ASSESSMENT Assessment/Changes in Function: Pt continues to reports exercises abolish her pain  
  
[]  See Progress Note/Recertification Patient will continue to benefit from skilled PT services to analyze, cue, progress, modify,, demonstrate, instruct, and address, movement patterns, therapeutic interventions, postural abnormalities, soft tissue restrictions, ROM, strength, functional mobility, body mechanics/ergonomics, and home and community integration, to attain remaining goals. Progress toward goals / Updated goals: Added birdges today for LTG 3 PLAN 
 
[]  Upgrade activities as tolerated YES Continue plan of care  
[]  Discharge due to :   
[]  Other:   
 
Therapist: Raimundo Alvarez DPT Date: 8/27/2020 Time: 1:58 PM  
 
Future Appointments Date Time Provider Marla Pollard 9/1/2020  9:15 AM Gerardo Caraballo  [address]   
9/3/2020  1:15 PM Vidhi Kwok, PT [facility]  
9/8/2020  2:00 PM Bella Thomas PTA [facility]  
9/10/2020  1:15 PM Vidhi Kwok, PT [facility]  
9/15/2020  1:15 PM Bella Thomas PTA [facility]  
9/17/2020  1:15 PM Alexandre Dalal, PT [facility]

## 2020-08-28 NOTE — PROGRESS NOTES
[address]  [facility] PHYSICAL THERAPY 
[address] [facility] [address], [place], [address] - Phone: [phone]  Fax: [phone] CONTINUED PLAN OF CARE/RECERTIFICATION FOR PHYSICAL THERAPY Patient Name: Ryne Hughes : 1945 Treatment/Medical Diagnosis: Low back pain [M54.5] Onset Date: Chronic with recent episode beginning 1-2 months ago Referral Source: General Rina MD Start of [facility]): 2020 Prior Hospitalization: See Medical History Provider #: [id number] Prior Level of Function: Ambulating with rollator during previous PT in 2019 Comorbidities: s/p thyroidectomy, hypercholesterolemia, gout, sleep apnea, s/p partial right nephrectomy Medications: Verified on Patient Summary List  
Visits from [facility]: 2 Missed Visits: 0 Goal/Measure of Progress Goal Met? 1. Patient will increase FOTO Functional Status score to 62/100 to indicate decreased functional limitations. Status at last Eval: 52 Current Status: nT n/a 2. Patient will demonstrate ability to perform 10 reps of supine bridge with good form to indicate improved core stability needed for ADL's. Status at last Eval: unable Current Status: initiated glut activation TE progressing 3. Patient will be independent with home exercise program for self management of symptoms Status at last Eval: Established HEP Current Status: compliant with HEP progressing Key Functional Changes/Progress: Pat Venegas has attended Eval and 1 appointment since [facility]. Despite lack of compliance with attendance secondary to not aware of needing to schedule pt has made some progress as evidence of the following. Pt reports compliance with HEP and is able to perform all TE in clinic without producing pain. Pt requires Moderate VCing for proper tech to perform TE. Pt is able to manage sxs with repeated/prolong L/s movement (MDT/Saba Edward). Pt reports max p! 4/10 and min p! 0/10. Pt demos sit<>stand transfer without UE assist x 10 without pain and with symmetrical WBing. Pt continues to have decrease core/glut, and LE strength which limits her with prolong activity, amb and standing. Problem List: pain affecting function, decrease ROM, decrease strength, edema affecting function, impaired gait/ balance, decrease ADL/ functional abilitiies, decrease activity tolerance, decrease flexibility/ joint mobility and decrease transfer abilities Treatment Plan may include any combination of the following: Therapeutic exercise, Therapeutic activities, Neuromuscular re-education, Physical agent/modality, Gait/balance training, Manual therapy, Patient education, Self Care training, Functional mobility training, Home safety training and Stair training Patient Goal(s) has been updated and includes:    
? Goals for this certification period include and are to be achieved in   4  weeks: 1. Patient will increase FOTO Functional Status score to 62/100 to indicate decreased functional limitations 2. Patient will demonstrate ability to perform 10 reps of supine bridge with good form to indicate improved core stability needed for ADL's. 
3.  Patient will be independent with home exercise program for self management of symptoms Frequency / Duration:   Patient to be seen   2-3   times per week for   4    weeks: 
 
Assessments/Recommendations: Pt will benefit from further skilled PT services to increase strength/stability and decrease sxs to promote functional mobility. If you have any questions/comments please contact us directly at [phone]. Thank you for allowing us to assist in the care of your patient. Therapist Signature: BELLA Tiwari MPT Date: 8/16/2020 Certification Period: 
Reporting Period: 7/16/2020 - 10/15/2020 
7/16/2020-8/18/2020 Time: 8:12 AM  
NOTE TO PHYSICIAN:  PLEASE COMPLETE THE ORDERS BELOW AND FAX TO  
 [facility] [phone]. If you are unable to process this request in 24 hours please contact our office: [phone]. 
 
___ I have read the above report and request that my patient continue as recommended.  
___ I have read the above report and request that my patient continue therapy with the following changes/special instructions: ________________________________________________  
___ I have read the above report and request that my patient be discharged from therapy.   
 
Physician Signature:        Date:       Time:

## 2020-09-01 NOTE — PROGRESS NOTES
[address] 
[address], [address] [place], [address] [place] [address] Phone: [phone] Fax: [phone] Viki Lopes : 1945 PCP: Lopez Godinez MD 
2020 PROGRESS NOTE HISTORY OF PRESENT ILLNESS Lorraine Sanabria is a 66 y.o. female who was seen as a new patient 19 with c/o low back pain radiating into the BLE with knee buckling. She noted that her legs and feet \"do not listen to me. \" She has been seen by Dr. Juan Hernandez for low back pain and unsteadiness causing falls. She has attended PT (8/15/19-19; Steve Skaggs). Pt reported pronation of her feet with ambulation, especially with walking down stairs, that she believed contributed to her falls. Pt c/o episodic bilateral groin pain. PMHx: MELANIE, hypothyroidism, COPD, CHF, ESRD on dialysis M,W,F and DM. Pt's  noted that since she has been undergoing dialysis, she is no longer diabetic. She has been undergoing dialysis x 3 years.  She saw some improvement with [address] she was able to get around the house better and use an exercise bike for exercise. Her  noted that she is now able to ambulate around the house and go up the stairs by herself. Lumbar spine MRI dated 2020 reviewed. Per report, No high-grade central or foraminal stenosis. No abnormal distal cord or conus signal is seen. Minimal degenerative changes in the lower lumbar spine , as described in detail in Findings section. Findings suggesting prior right-sided nephrectomy. Per my reading, there is significant muscular atrophy. She found improvement with Home Health PT, and she has been ambulating better. She no longer experiences leg buckling. She has been maintaining a daily HEP. She continues to have some mild residual back pain. Lorraine Sanabria comes in to the office today for f/u.  She has been in outpatient PT (2020-2020; Steve Skaggs) with continued improvement. She is able to use her stairs now without a cane. She reports some right flank pain - she is unsure if it is due to gas or muscle pain from her PT exercises. She mostly uses her cane in her right hand. Pain Score: 3/10. PmHx: MELANIE, hypothyroidism, COPD, CHF, ESRD on dialysis M,W,F and DM. ASSESSMENT This is a 66year-old female with h/o bilateral leg weakness and gait instability due to deconditioning. She continues to see improvement of her mobility and ambulation with home health PT. PLAN 1. Continue PT. Pt will f/u in 6 months or sooner as needed. Diagnoses and all orders for this visit: 
 
1. Myofascial pain 2. Mechanical low back pain 3. Muscular deconditioning PAST MEDICAL HISTORY Past Medical History:  
Diagnosis Date  Anemia in CKD (chronic kidney disease)  Aortic stenosis  Asthma  Calculus of kidney  Cancer ([address] right kidney  Cardiomyopathy ([address] EF 20-25% (10/16), 55% (06/16)  Chronic kidney disease   
 dialysis MWF  Chronic kidney disease, stage V ([address]  Diabetes ([address]  Dialysis patient [facility])  Dialysis patient [facility] M-W-F; LEFT AVF; PERM CATH RIGHT CHEST  Gastrointestinal hemorrhage associated with peptic ulcer  Gastrointestinal hemorrhage associated with peptic ulcer 10/5/2016  Gout  Hypercholesterolemia  Hypertension  Menopause  Primary hyperparathyroidism ([address] 2010  
 s/p parathyroid adenoma excision  Secondary hyperparathyroidism (of renal origin)  Sleep apnea C PAP  Status post nephrectomy   
 right  Thyroid disease Past Surgical History:  
Procedure Laterality Date  COLONOSCOPY  08/28/2015 Repeat 5 years  HX GYN    
 C-sections x 3  
 HX HEENT    
 total thyroidectomy  HX NEPHRECTOMY    
 right  HX THYROIDECTOMY  HX VASCULAR ACCESS    
 dialysis catheter R upper chest  
  STEVIE STEREO  BX BREAST LT 1ST LESION W/CLIP AND SPECIMEN Left 10/31/2017  VASCULAR SURGERY PROCEDURE UNLIST    
 fistula LIOR Nielsen Fruit MEDICATIONS Current Outpatient Medications Medication Sig Dispense Refill  cholecalciferol (VITAMIN D3) (5000 Units/125 mcg) tab tablet TAKE 1 TABLET BY MOUTH EVERY OTHER  DAY  atorvastatin (LIPITOR) 40 mg tablet TAKE 1/2 TABLET BY MOUTH DAILY 90 Tab 1  
 budesonide-formoteroL (Symbicort) 160-4.5 mcg/actuation HFAA Take 2 Puffs by inhalation two (2) times a day. 1 Inhaler 3  
 losartan (COZAAR) 50 mg tablet Take 1 Tab by mouth daily. 90 Tab 1  
 metoprolol succinate (TOPROL-XL) 50 mg XL tablet Take 1 Tab by mouth daily. 90 Tab 1  
 albuterol (VENTOLIN HFA) 90 mcg/actuation inhaler Take 2 Puffs by inhalation every six (6) hours as needed.  FABBY-DARLIN 0.8 mg tab tablet Take 0.8 mg by mouth daily. 11  
 OXYGEN-AIR DELIVERY SYSTEMS Take 3 L by inhalation continuous. ALLERGIES Allergies Allergen Reactions  Ace Inhibitors Cough  Aspirin Other (comments) Hallucinations  Hydralazine Other (comments) Hallucinations, dizziness and numbness in legs  Sensipar [Cinacalcet] Unknown (comments) SOCIAL HISTORY Social History Socioeconomic History  Marital status:  Spouse name: Not on file  Number of children: Not on file  Years of education: Not on file  Highest education level: Not on file Tobacco Use  Smoking status: Never Smoker  Smokeless tobacco: Never Used Substance and Sexual Activity  Alcohol use: No  
 Drug use: No  
 Sexual activity: Yes  
  Partners: Male FAMILY HISTORY Family History Problem Relation Age of Onset  Hypertension Mother  Diabetes Mother  No Known Problems Father  No Known Problems Sister  No Known Problems Brother  No Known Problems Other  No Known Problems Brother  No Known Problems Brother  No Known Problems Daughter  No Known Problems Son  No Known Problems Son REVIEW OF SYSTEMS 
ROS  
 
 
PHYSICAL EXAMINATION Visit Vitals /67 (BP 1 Location: Right arm, BP Patient Position: Sitting) Pulse 79 Temp 97.5 °F (36.4 °C) (Oral) Resp 16 Ht 4' 8\" (1.422 m) Wt 135 lb (61.2 kg) SpO2 98% BMI 30.27 kg/m² Pain Assessment  9/1/2020 Location of Pain Back Location Modifiers Lateral  
Severity of Pain 3 Quality of Pain Dull;Aching Quality of Pain Comment - Duration of Pain Persistent Duration of Pain Comment - Frequency of Pain Constant Aggravating Factors Walking;Standing Aggravating Factors Comment - Limiting Behavior -  
Relieving Factors Nothing Relieving Factors Comment - Result of Injury - Work-Related Injury - Type of Injury - Constitutional:  Well developed, well nourished, in no acute distress. Psychiatric: Affect and mood are appropriate. Integumentary: No rashes or abrasions noted on exposed areas. SPINE/MUSCULOSKELETAL EXAM 
 
Cervical spine: 
Neck is midline. Normal muscle tone. No focal atrophy is noted. ROM pain free. Shoulder ROM intact.  
No tenderness to palpation. Negative Spurling's sign. Negative Tinel's sign. Negative Harper's sign.  
  
Sensation in the bilateral arms grossly intact to light touch.  
  
Lumbar spine: No rash, ecchymosis, or gross obliquity. No fasciculations. No focal atrophy is noted. No pain with hip ROM. Full range of motion. No tenderness to palpation. No tenderness to palpation at the sciatic notch. SI joints non-tender (L>R) with weight-bearing; non tender when non-weight bearing. Trochanters non tender. 
  
Sensation in the bilateral legs grossly intact to light touch. 
  
Pain in right groin reproduced with internal rotation of the right hip. 
  
Negative CONSTANTINO and P4 on the left. 
  
Updates 6/23/2020: Tenderness to palpation of thoracolumbar regions (R>L) Pain with extension = flexion Shoulder protraction 
  
  
MOTOR:   
   Biceps  Triceps Deltoids Wrist Ext Wrist Flex Hand Intrin Right +4/5 +4/5 +4/5 +4/5 +4/5 +4/5 Left +4/5 +4/5 +4/5 +4/5 +4/5 +4/5  
               
  Hip Flex  Quads Hamstrings Ankle DF EHL Ankle PF Right 4/5 4/5 4/5 4/5 4/5 4/5 Left 4/5 4/5 4/5 4/5 4/5 4/5  
  
DTRs are 1+ biceps, triceps, brachioradialis, patella, and Achilles. 
  
Negative Straight Leg raise. Squat not tested. No difficulty with tandem gait.  
  
Ambulation with single point cane. FWB. Also using wheelchair.  
  
 
RADIOGRAPHS Lumbar MRI images taken on 1/16/2020 personally reviewed with patient: 
Normal lumbar spine vertebral alignment is present without subluxation.  A pars 
defect is not seen. 
  
No suspicious osseous marrow lesion is seen.  Vertebral body heights are 
preserved. No marrow edema is present.     
  
The conus medullaris is located at the superior L2 level and has a normal 
appearance and signal. No abnormal signal is identified in the visualized distal 
cord. 
  
L1/2 level: There is no central or foraminal stenosis. 
  
L2/3 level: There is no central or foraminal stenosis. 
  
L3/4 level: There is no central or foraminal stenosis. 
  
L4/5 level: Minimal disc bulge is present. Minimal facet arthropathy is seen. There is no central or foraminal stenosis. 
  
L5/S1 level: Minimal disc bulge is present. Very mild asymmetric left-sided 
endplate osteophyte formation is present. Thecal sac is beginning to normally 
taper at this level. There is no central or foraminal stenosis. 
  
Right kidney is not identified. Metallic artifact is present suggesting prior 
nephrectomy. 
  
_______________________ 
  
IMPRESSION IMPRESSION: 
  
1. No high-grade central or foraminal stenosis. 
  
2.  No abnormal distal cord or conus signal is seen. 
  
 3. Minimal degenerative changes in the lower lumbar spine , as described in 
detail in Findings section. 
  
4. Findings suggesting prior right-sided nephrectomy. 2V Lumbar XR images taken on 12/2/19 personally reviewed with patient: 
Osteopenic appearance -  translucency of vertebrae Generalized degenerative changes Disc space narrowing at L5-S1 
 
10 minutes of face-to-face contact were spent with the patient during today's visit extensively discussing symptoms and treatment plan. All questions were answered. More than half of this visit today was spent on counseling.   
 
Written by Travis Maria as dictated by Shanell Garzon MD

## 2020-09-08 NOTE — PROGRESS NOTES
PHYSICAL THERAPY - DAILY TREATMENT NOTE Patient Name: France Quiñones        Date: 2020 : 1945   yes Patient  Verified Visit #:     Insurance: Payor: Jaycee Giraldo / Plan: [address] [place] [address] HMO / Product Type: Managed Care Medicare / In time:  202 Out time: 254 Total Treatment Time: 46 Medicare/BCBS Time Tracking (below) Total Timed Codes (min):   52 1:1 Treatment Time:  46 TREATMENT AREA =  Low back pain [M54.5] SUBJECTIVE Pain Level (on 0 to 10 scale):  3.5 / 10 Medication Changes/New allergies or changes in medical history, any new surgeries or procedures?    no  If yes, update Summary List  
Subjective Functional Status/Changes:  []  No changes reported \"I really want to thank you all for teaching me to lie on my stomach it always takes away the pain\" OBJECTIVE Modalities Rationale: PD 
 
 37 min Therapeutic Exercise:  [x]  See flow sheet Rationale:      increase ROM and increase strength to improve the patients ability to safely perform ADLs/ bending/stooping/ lifting/prolong sitting, stding and amb/ stairs with minimal to no pain 15 min Therapeutic Activity: [x]  See flow sheet 
postural training 
log roll>sup>sit 
sit <>std without UE HK amb 
side stepping Rationale:    increase ROM, increase strength and improve coordination to improve the patients ability to safely perform ADLs/ bending/stooping/ lifting/prolong sitting, stding and amb/ stairs with minimal to no pain Billed With/As: 
 [x] TE 
 [x] TA 
 [] Neuro 
 [] Self Care Patient Education: [x] Review HEP [] Progressed/Changed HEP based on:  
[x] positioning   [x] body mechanics   [x] transfers   [] heat/ice application   
[x] other: Pt ed on importance and benefits of compliance with HEP, core strength/stability and proper posture; pt verbalized understanding Other Objective/Functional Measures: TCs, VCs + demo to perform proper technique for TE 
JUSTICE- abolished pain Initiated OTB with seated IR/ER without c/o p! c/o decrease strength right hip ER SBA for HK and side stepping amb x 30' Post Treatment Pain Level (on 0 to 10) scale:   0 / 10 ASSESSMENT Assessment/Changes in Function:  
Progressed there-ex without c/o increase p! 
 
difficulty performing log roll to left, improved right 
demos fwd trunk with gait, and decreased foot clearance, improved with proper instruction   
[]  See Progress Note/Recertification Patient will continue to benefit from skilled PT services to modify and progress therapeutic interventions, address functional mobility deficits, address ROM deficits, address strength deficits, analyze and address soft tissue restrictions, analyze and cue movement patterns, analyze and modify body mechanics/ergonomics, assess and modify postural abnormalities and instruct in home and community integration to attain remaining goals. Progress toward goals / Updated goals: 1. Patient will demonstrate ability to abolish low back pain with repeated trunk extension exercises. MET 
2. Patient will be compliant with home exercise program.MET Long Term Goals: To be accomplished in  4  weeks: 1. Patient will increase FOTO Functional Status score to 62/100 to indicate decreased functional limitations. 2.  Patient will demonstrate ability to perform 10 reps of supine bridge with good form to indicate improved core stability needed for ADL's.progressing, demos fair form 3. Patient will be independent with home exercise program for self management of symptoms. PLAN [x]  Upgrade activities as tolerated yes Continue plan of care  
[]  Discharge due to :   
[]  Other:   
 
Therapist: Halie Morales PTA Date: 9/8/2020 Time: 2:21 PM  
 
Future Appointments Date Time Provider Marla Pollard 9/15/2020  1:15 PM Re, [address]  
 9/17/2020  1:15 PM Makenzie Pearson, PT [facility]  
3/2/2021  9:30 AM Leni Franco  [id number]

## 2020-09-22 NOTE — PROGRESS NOTES
PHYSICAL THERAPY - DAILY TREATMENT NOTE Patient Name: Siva Confer        Date: 2020 : 1945   YES Patient  Verified Visit #:     Insurance: Payor: Philippe Jorgensen / Plan: [address] [place] [address] HMO / Product Type: Managed Care Medicare / In time: 203 Out time: 245 Total Treatment Time: 42 Medicare/BCBS Time Tracking (below) Total Timed Codes (min):  42 1:1 Treatment Time:  42 TREATMENT AREA =  Low back pain [M54.5] SUBJECTIVE Pain Level (on 0 to 10 scale):  2  / 10 Medication Changes/New allergies or changes in medical history, any new surgeries or procedures? NO    If yes, update Summary List  
Subjective Functional Status/Changes:  []  No changes reported Katie Sweeney reports 80% improvement since start of care but still having trouble with increased lower back pain. She is able to walk for longer distances with less assistance of single point cane. She has been compliant with her HEP and wishes to continue with physical therapy. Compliance with HEP  Yes [] No [] OBJECTIVE Therapeutic Procedures: 
Min Procedure Specifics + Rationale  
42(38) [x] Therapeutic Exercise See Flowsheet Rationale: increase ROM and increase strength to improve the patients ability to participate in ADL's   
 
 min Patient Education:  Abraham Lau Other Objective/Functional Measures: 
 
See flowsheet for more details See PN 
 
min VC and demos required throughout session for proper form and increased safety Progressed therex per flowsheet Post Treatment Pain Level (on 0 to 10) scale:   0  / 10 ASSESSMENT Assessment/Changes in Function:  
 
See PN 
  
[]  See Progress Note/Recertification Patient will continue to benefit from skilled PT services to analyze,, cue,, progress,, modify,, demonstrate,, instruct, and address, movement patterns,, therapeutic interventions,, postural abnormalities,, soft tissue restrictions,, ROM,, strength,, functional mobility,, body mechanics/ergonomics, and home and community integration, to attain remaining goals. Progress toward goals / Updated goals: 1. Patient will increase FOTO Functional Status score to 62/100 to indicate decreased functional limitations Progressing: scored 56/100 compared to 52/100 at [facility] 2. Patient will demonstrate ability to perform 10 reps of supine bridge with good form to indicate improved core stability needed for ADL's. Progressing. Able to complete with symmetrical hip clearance but still limited. Complete 75% of full movement. 3.  Patient will be independent with home exercise program for self management of symptoms Progressing. She reports being compliant but does require infrequent cuing within treatment session 
   
 
PLAN [x]  Upgrade activities as tolerated YES Continue plan of care  
[]  Discharge due to :   
[]  Other:   
 
Therapist: Kiley Valdez DPT Date: 9/22/2020 Time: 1:57 PM  
 
Future Appointments Date Time Provider Marla Pollrad 9/22/2020  2:00 [facility]  
9/29/2020  9:00 AM [facility] 1 [facility]  
12/24/2020 10:00 AM Cally Forde MD [facility]  
3/2/2021  9:30 AM Carroll Fernandez MD [facility]

## 2020-09-22 NOTE — PROGRESS NOTES
Shagufta Tipton [address]  [facility] PHYSICAL THERAPY 
[address] [facility] [address], [place], [address] - Phone: [phone]  Fax: [phone] Progress Note/CONTINUED PLAN OF CARE for PHYSICAL THERAPY Patient Name: Robert Saravia : 1945 Treatment/Medical Diagnosis: Low back pain [M54.5] Referral Source: Tam Gauthier MD Start of Care [facility] 2020 Prior Hospitalization: See Medical History Provider #: [id number] Medications: Verified on Patient Summary List  
Visits from [facility]: 6 Missed Visits: 4 SUMMARY OF TREATMENT Patient's POC has consisted of therex, therapeutic activities, manual therapy prn, modalities prn, pt. education, and a comprehensive HEP. Treatment strategies used to address functional mobility deficits, ROM deficits, strength deficits, analyze and address soft tissue restrictions, analyze and cue movement patterns, analyze and modify body mechanics/ergonomics, assess and modify postural abnormalities and instruct in home and community integration. CURRENT STATUS Mathieu Abarca reports 80% improvement since start of care but still having trouble with increased lower back pain. She is able to walk for longer distances with less assistance of single point cane. She has been compliant with her HEP and wishes to continue with physical therapy. She is progressing towards goals and would benefit from 1 more month of physical therapy. Goal/Measure of Progress 1. Patient will increase FOTO Functional Status score to 62/100 to indicate decreased functional limitations Progressing: scored 56/100 compared to 52/100 at [facility] 
  
2. Patient will demonstrate ability to perform 10 reps of supine bridge with good form to indicate improved core stability needed for ADL's. Progressing. Able to complete with symmetrical hip clearance but still limited.  Complete 75% of full movement. 
  
3.  Patient will be independent with home exercise program for self management of symptoms Progressing. She reports being compliant but does require infrequent cuing within treatment session 
   
  
 
New Goals to be achieved in   3-5  weeks: 1. Patient will increase FOTO Functional Status score to 62/100 to indicate decreased functional limitations 2. Patient will demonstrate ability to perform 10 reps of supine bridge with good form to indicate improved core stability needed for ADL's. 3.  Patient will be independent with home exercise program for self management of symptoms Frequency / Duration:   Patient to be seen   2-1   times per week for   3-5    weeks: 
 
RECOMMENDATIONS: Continue and progress functional therex/therapeutic activity as able, utilizing manual therapy and modalities prn. Progress patient towards independent HEP to facilitate self-management of symptoms and progress gains after PT. If you have any questions/comments please contact us directly at [phone]. Thank you for allowing us to assist in the care of your patient. Therapist Signature: Krissy Upton DPT Date: 9/22/2020 Certification Period: 
Reporting Period: 7/16/2020 - 10/15/2020 
7/16/2020 - 9/22/2020 Time: 2:21 PM  
NOTE TO PHYSICIAN:  PLEASE COMPLETE THE ORDERS BELOW AND FAX TO [facility] [place] Physical Therapy: [phone]. If you are unable to process this request in 24 hours please contact our office: [phone]. 
 
___ I have read the above report and request that my patient continue as recommended.  
___ I have read the above report and request that my patient continue therapy with the following changes/special instructions: ________________________________________________  
___ I have read the above report and request that my patient be discharged from therapy.   
 
Physician Signature:        Date:       Time:

## 2020-09-29 NOTE — PROGRESS NOTES
PHYSICAL THERAPY - DAILY TREATMENT NOTE Patient Name: Les Rodriguez        Date: 2020 : 1945   YES Patient  Verified Visit #:     Insurance: Payor: Terrance Beckman / Plan: [address] [place] [address] HMO / Product Type: Managed Care Medicare / In time: 2:00 Out time: 2:45 Total Treatment Time: 45 Medicare/BCBS Time Tracking (below) Total Timed Codes (min):  45 1:1 Treatment Time:  45 TREATMENT AREA = Low back pain [M54.5] SUBJECTIVE Pain Level (on 0 to 10 scale):  0  / 10 Medication Changes/New allergies or changes in medical history, any new surgeries or procedures? NO    If yes, update Summary List  
Subjective Functional Status/Changes:  []  No changes reported \"I'm doing pretty good. Liking the new exercises\" OBJECTIVE Therapeutic Procedures: 
Min Procedure Specifics + Rationale  
n/a [x]  Patient Education (performed throughout session) [x] Review HEP [] Progressed/Changed HEP based on:  
[] proper performance and advancement of Therex/TA [] reduction in pain level   
[] increased functional capacity [] change in directional preference  
45(45) [x] Therapeutic Exercise [x]  See Flowsheet Rationale: increase ROM and increase strength to improve the patients ability to participate in ADL's Other Objective/Functional Measures: Added new therex Post Treatment Pain Level (on 0 to 10) scale:   0  / 10 ASSESSMENT Assessment/Changes in Function:  
 
 
Performed new therex with minimal complaints, but required VC's and TC's to perform correctly.    
  
  
Patient will continue to benefit from skilled PT services to modify and progress therapeutic interventions, address functional mobility deficits, address ROM deficits, address strength deficits, analyze and address soft tissue restrictions, analyze and cue movement patterns, analyze and modify body mechanics/ergonomics and instruct in home and community integration  to attain remaining goals Progress toward goals / Updated goals: 
 
1st session since progress note. No significant progress observed PLAN [x]  Upgrade activities as tolerated 
[x]  Update interventions per flow sheet YES Continue plan of care  
[]  Discharge due to :   
[]  Other:   
 
Therapist: Raj Stallings \"BJ\" Marcellus, DPT, Cert. MDT, Cert. DN, Cert. SMT, Dip. Osteopractic Date: 9/29/2020 Time: 2:37 PM  
 
Future Appointments Date Time Provider Marla Pollard 10/1/2020  2:00 PM Nate Delgado, PT [facility]  
10/6/2020  2:00 PM Collette Every, PTA [facility]  
10/8/2020  2:00 PM Nate Delgado, PT [facility]  
10/13/2020  2:00 PM Alison Costa, PT [facility]  
10/15/2020  2:00 PM Alison Costa, PT [facility]  
10/20/2020  2:00 PM Collette Every, PTA [facility]  
10/22/2020  2:00 PM Nate Delgado, PT [facility]  
10/27/2020  2:00 PM Collette Every, Valerio Monaco [facility]  
10/29/2020  2:00 PM Alison Costa, PT [facility]  
12/24/2020 10:00 AM Nicolas Urrutia MD A[facility] AM[facility]  
3/2/2021  9:30 AM Claudetta Saa, MD [facility] AM[facility]

## 2020-10-01 NOTE — PROGRESS NOTES
PHYSICAL THERAPY - DAILY TREATMENT NOTE Patient Name: Aline Landis        Date: 10/1/2020 : 1945   YES Patient  Verified Visit #:   +  Insurance: Payor: Christina Streeter / Plan: [address] [place] [address] HMO / Product Type: Managed Care Medicare / In time: 1:58 Out time: 2:40 Total Treatment Time: 42 Medicare/BCBS Time Tracking (below) Total Timed Codes (min):  42 1:1 Treatment Time:  42 TREATMENT AREA = Low back pain [M54.5] SUBJECTIVE Pain Level (on 0 to 10 scale):  0  / 10 Medication Changes/New allergies or changes in medical history, any new surgeries or procedures? NO    If yes, update Summary List  
Subjective Functional Status/Changes:  []  No changes reported \"Not much pain to complaint about. I'm just tired. \"  
 
  
 
OBJECTIVE Therapeutic Procedures: 
Min Procedure Specifics + Rationale  
n/a [x]  Patient Education (performed throughout session) [x] Review HEP [] Progressed/Changed HEP based on:  
[] proper performance and advancement of Therex/TA [] reduction in pain level   
[] increased functional capacity [] change in directional preference  
42(42) [x] Therapeutic Exercise [x]  See Flowsheet Rationale: increase ROM and increase strength to improve the patients ability to participate in ADL's Other Objective/Functional Measures: Added DD with therex per flow sheet Post Treatment Pain Level (on 0 to 10) scale:   0  / 10 ASSESSMENT Assessment/Changes in Function: Tolerated treatment well without complaints of progression, indicating improved functional capacity for ADL's.  
  
  
  
Patient will continue to benefit from skilled PT services to modify and progress therapeutic interventions, address functional mobility deficits, address ROM deficits, address strength deficits, analyze and address soft tissue restrictions, analyze and cue movement patterns, analyze and modify body mechanics/ergonomics and instruct in home and community integration  to attain remaining goals Progress toward goals / Updated goals: 
 
Performed all therex with no complaints PLAN [x]  Upgrade activities as tolerated 
[x]  Update interventions per flow sheet YES Continue plan of care  
[]  Discharge due to :   
[]  Other:   
 
Therapist: Danette Lopez \"BJ\" BOB Germain, Cert. MDT, Cert. DN, Cert. SMT, Dip. Osteopractic Date: 10/1/2020 Time: 2:08 PM  
 
Future Appointments Date Time Provider Marla Pollard 10/6/2020  2:00 PM Sundeep Hamilton PTA [facility]  
10/8/2020  2:00 PM Vaishali Bess, PT [facility]  
10/13/2020  2:00 PM Bailey Alcazar, PT [facility]  
10/15/2020  2:00 PM Bailey Alcazar, PT [facility]  
10/20/2020  2:00 PM Sundeep Hamilton PTA [facility]  
10/22/2020  2:00 PM Vaishali Bess, PT [facility]  
10/27/2020  2:00 PM Sundeep Hamilton, [place] [facility]  
10/29/2020  2:00 PM Bailey Alcazar, PT [facility]  
12/24/2020 10:00 AM Molly Sanz MD AMA [facility] AM[facility]  
3/2/2021  9:30 AM Brent Mehta MD [facility] AM[facility]

## 2020-10-08 NOTE — PROGRESS NOTES
PHYSICAL THERAPY - DAILY TREATMENT NOTE Patient Name: Caty Perez        Date: 10/8/2020 : 1945   YES Patient  Verified Visit #:   10   of   12  Insurance: Payor: Gilberto Fisher / Plan: [address] [place] [address] HMO / Product Type: Managed Care Medicare / In time: 1:55 Out time: 2:40 Total Treatment Time: 45 Medicare/BCBS Time Tracking (below) Total Timed Codes (min):  45 1:1 Treatment Time:  45 TREATMENT AREA = Low back pain [M54.5] SUBJECTIVE Pain Level (on 0 to 10 scale):  0  / 10 Medication Changes/New allergies or changes in medical history, any new surgeries or procedures? NO    If yes, update Summary List  
Subjective Functional Status/Changes:  []  No changes reported \"i'm doing pretty good. I can walk faster. I can do things now without stopping so much. \" OBJECTIVE Therapeutic Procedures: 
Min Procedure Specifics + Rationale  
n/a [x]  Patient Education (performed throughout session) [x] Review HEP [] Progressed/Changed HEP based on:  
[] proper performance and advancement of Therex/TA [] reduction in pain level   
[] increased functional capacity [] change in directional preference 35 [x] Therapeutic Exercise [x]  See Flowsheet Rationale: increase ROM and increase strength to improve the patients ability to participate in ADL's   
10(10) [x] Therapeutic Activity [x]  See Flowsheet Step Ups, sit <-->stand, wall (bar) push ups Rationale: To improve safety, proprioception, coordination, and efficiency with tasks Other Objective/Functional Measures: 
 
Able to perform sit <-->stand without UE assist.  
Increased reps/sets/resistance per flow sheet. Post Treatment Pain Level (on 0 to 10) scale:   0  / 10 ASSESSMENT Assessment/Changes in Function:  
 
 
Improved conditioning and standing activity tolerance as patient required rest periods vs prior sessions. Improved LE strength as patient able to perform transfers without UE Patient will continue to benefit from skilled PT services to modify and progress therapeutic interventions, address functional mobility deficits, address ROM deficits, address strength deficits, analyze and address soft tissue restrictions, analyze and cue movement patterns, analyze and modify body mechanics/ergonomics, assess and modify postural abnormalities and instruct in home and community integration  to attain remaining goals Progress toward goals / Updated goals: 
 
Excellent progress in ADL tolerance without recurrence of pain PLAN [x]  Upgrade activities as tolerated 
[x]  Update interventions per flow sheet YES Continue plan of care  
[]  Discharge due to :   
[]  Other:   
 
Therapist: Rachel Ibrahim \"BJ\" BOB Germain, Cert. MDT, Cert. DN, Cert. SMT, Dip. Osteopractic Date: 10/8/2020 Time: 2:01 PM  
 
Future Appointments Date Time Provider Marla Pollard 10/13/2020  2:00 PM Lorin Lu PT [facility]  
10/15/2020  2:00 PM Lorin Lu PT [facility]  
10/20/2020  2:00 PM Danika Suggs, [place] [facility]  
10/22/2020  2:00 PM Pro Hagen PT [facility]  
10/27/2020  2:00 PM Danika Suggs, [place] [facility]  
10/29/2020  2:00 PM Lorin Lu PT [facility]  
12/24/2020 10:00 AM Carlos Valladares MD AMA BS AMB  
3/2/2021  9:30 AM Aaliyah Martines MD [facility] AMB

## 2020-10-22 NOTE — PROGRESS NOTES
Shagufta Tipton [address] [place] [address] PHYSICAL THERAPY 
[address] [facility] [address], [place], [address] - Phone: [phone]  Fax: [phone] Dear Dr. Rae Brooke MD, Per your referral, Peyton Dawn, 1945, was evaluated and treated for the diagnosis of Low back pain [M54.5]. The patient was scheduled for 12 visits after her initial evaluation on 7/16/2020. She attended 10 of her follow up sessions over the course of 14 weeks , missing 9 sessions due to various excuses, with her missing her last 3 in a row. Patient was contacted re: her lack of attendance, and she reported financial constraints preventing her from returning to therapy despite being provided paperwork for financial aide. Patient stated that she is relatively pain free, depending on her activity levels, and is independent with her HEP. Patient verbalized understanding that she will be DC'd at this time due to poor attendance compliance. If you have any questions/comments please contact us directly at [phone]. Thank you for allowing us to assist in the care of your patient. Therapist Signature: Saleem Gonzalez DPT, Cert. MDT, Cert. DN, Cert. SMT, Dip. Osteopractic Date: 10/22/2020 Certification Period 7/16/2020-10/15/2020 Time: 2:18 PM  
Reporting Period 9/22/2020-10/22/2020 NOTE TO PHYSICIAN:  PLEASE COMPLETE THE ORDERS BELOW AND FAX TO [facility] Physical Therapy: [phone] If you are unable to process this request in 24 hours please contact our office: [phone] 
 
___ I have read the above report and request that my patient continue as recommended.  
___ I have read the above report and request that my patient continue therapy with the following changes/special instructions:_________________________________________________________  
___ I have read the above report and request that my patient be discharged from therapy. Physician Signature:        Date:       Time:

## 2020-12-09 PROBLEM — N25.81 SECONDARY HYPERPARATHYROIDISM (HCC): Status: ACTIVE | Noted: 2020-01-01

## 2020-12-09 NOTE — PROGRESS NOTES
Matias Nation is a 66 y.o. female who was seen by synchronous (real-time) audio-video technology on 12/9/2020 for Annual Wellness Visit Assessment & Plan:  
Diagnoses and all orders for this visit: 
 
1. Medicare annual wellness visit, subsequent-see note below 2. Type 2 diabetes with nephropathy (HCC)-check A1C next visit, foot check 3. Essential hypertension-will change the 2 50 mg pills to 1 100 mg pill 
-     losartan (COZAAR) 100 mg tablet; Take 1 Tab by mouth daily. 4. Hyperlipidemia, unspecified hyperlipidemia type-check fasting lipids next visit 
-     atorvastatin (LIPITOR) 10 mg tablet; Take 1 Tab by mouth daily; recheck next visit Lab Results Component Value Date/Time Cholesterol, total 195 03/20/2020 10:59 AM  
 HDL Cholesterol 64 03/20/2020 10:59 AM  
 LDL, calculated 107 (H) 03/20/2020 10:59 AM  
 VLDL, calculated 24 03/20/2020 10:59 AM  
 Triglyceride 118 03/20/2020 10:59 AM  
 CHOL/HDL Ratio 2.3 09/29/2010 02:20 PM  
 
5. Secondary hyperparathyroidism (HCC)-on new med Peg Milder which causes her to have diarrhea but told she needs to be on this 6. Diarrhea due to drug 
-     diphenoxylate-atropine (LOMOTIL) 2.5-0.025 mg per tablet; Take 1 Tab by mouth two (2) times daily as needed for Diarrhea. Max Daily Amount: 2 Tabs. 7. Chronic obstructive pulmonary disease, unspecified COPD type ([facility])-still coughing despite daily Symbicort; will see Dr Miguelito Mcdaniels shortly 8. ESRD (end stage renal disease) on dialysis ([facility])-[place] [facility] [place]     Fozia-Linn 0.8 mg tab tablet; Take 1 Tab by mouth daily. Follow-up and Dispositions · Return in about 3 months (around 3/9/2021) for follow up. 
  
 will need Dexa 
712 Subjective:  
 
BP good at home per  Off Metoprolol BP Readings from Last 3 Encounters:  
09/01/20 171/67  
06/23/20 122/62  
03/17/20 132/53 Needs diabetic foot check per  [facility] [place] for low back pain-occasional soreness-no walker! Able to walk on her own 9/15/2020-ER visit for abdominal pain-gas from eating too much cabbage Diarrhea- at least 3-4x a day Parsibiv-new med, needs it side effect of diarrhea-sev times a day Amlodipine and Losartan also causes diarrhea Cannot shake her cough-months now, she will see Dr Jere William Still uses her O2 at home Prior to Admission medications Medication Sig Start Date End Date Taking? Authorizing Provider  
amLODIPine (NORVASC) 5 mg tablet Take 5 mg by mouth daily. Yes Provider, Historical  
Fozia-Linn 0.8 mg tab tablet Take 1 Tab by mouth daily. 12/9/20  Yes Lakesha Palmer MD  
losartan (COZAAR) 100 mg tablet Take 1 Tab by mouth daily. 12/9/20  Yes Lakesha Palmer MD  
atorvastatin (LIPITOR) 10 mg tablet Take 1 Tab by mouth daily. 12/9/20  Yes Lakesha Palmer MD  
diphenoxylate-atropine (LOMOTIL) 2.5-0.025 mg per tablet Take 1 Tab by mouth two (2) times daily as needed for Diarrhea. Max Daily Amount: 2 Tabs. 12/9/20  Yes Lakesha Palmer MD  
cholecalciferol (VITAMIN D3) (5000 Units/125 mcg) tab tablet TAKE 1 TABLET BY MOUTH EVERY OTHER  DAY 5/11/20  Yes Provider, Historical  
budesonide-formoteroL (Symbicort) 160-4.5 mcg/actuation HFAA Take 2 Puffs by inhalation two (2) times a day. 3/17/20  Yes Lakesha Palmer MD  
albuterol (VENTOLIN HFA) 90 mcg/actuation inhaler Take 2 Puffs by inhalation every six (6) hours as needed. Yes Provider, Historical  
OXYGEN-AIR DELIVERY SYSTEMS Take 3 L by inhalation continuous. 10/30/16  Yes Provider, Historical  
       
       
       
       
 
Patient Active Problem List  
 Diagnosis Date Noted  Secondary hyperparathyroidism ([facility] [address] 12/09/2020  Chronic obstructive pulmonary disease ([facility] [address] 05/04/2018  Type 2 diabetes mellitus with nephropathy ([facility] [address] 01/09/2018  ESRD (end stage renal disease) on dialysis ([facility] [address] 05/02/2017  Advance directive discussed with patient 03/22/2017  Essential hypertension 03/22/2017  Hypothyroidism due to acquired atrophy of thyroid 03/22/2017  Type 2 diabetes mellitus with hyperglycemia, with long-term current use of insulin ([facility] 03/13/2017  CHF (congestive heart failure) ([facility] 10/06/2016  Anemia in chronic kidney disease 03/23/2016  MELANIE on CPAP 03/23/2016  Tubular adenoma of colon 11/04/2015  Gout 01/09/2013  Hyperlipidemia 01/09/2013 ROS Pt denies: Wt loss, Fever/Chills, HA, Visual changes, Fatigue, Chest pain, SOB, LEBLANC, Abd pain, N/V/D/C, Blood in stool or urine, Edema. Pertinent positive as above in HPI. All others were negative Objective: No flowsheet data found. General: alert, cooperative, no distress Mental  status: normal mood, behavior, speech, dress, motor activity, and thought processes, able to follow commands HENT: NCAT Neck: no visualized mass Resp: no respiratory distress Neuro: no gross deficits Skin: no discoloration or lesions of concern on visible areas Psychiatric: normal affect, consistent with stated mood, no evidence of hallucinations Additional exam findings: obese We discussed the expected course, resolution and complications of the diagnosis(es) in detail. Medication risks, benefits, costs, interactions, and alternatives were discussed as indicated. I advised her to contact the office if her condition worsens, changes or fails to improve as anticipated. She expressed understanding with the diagnosis(es) and plan. Dimitri Louis, who was evaluated through a patient-initiated, synchronous (real-time) audio-video encounter, and/or her healthcare decision maker, is aware that it is a billable service, with coverage as determined by her insurance carrier. She provided verbal consent to proceed: Yes, and patient identification was verified.  It was conducted pursuant to the emergency declaration under the [facility] [address] [place] [employer] authority and the [employer] and [employer] General Act. A caregiver was present when appropriate. Ability to conduct physical exam was limited. I was at home. The patient was at home. Jen Cheek MD 
 
This is the Subsequent Medicare Annual Wellness Exam, performed 12 months or more after the Initial AWV or the last Subsequent AWV I have reviewed the patient's medical history in detail and updated the computerized patient record. Depression Risk Factor Screening:  
 
3 most recent PHQ Screens 9/1/2020 Little interest or pleasure in doing things Not at all Feeling down, depressed, irritable, or hopeless Not at all Total Score PHQ 2 0 Alcohol Risk Screen Do you average more than 1 drink per night or more than 7 drinks a week:  No 
 
On any one occasion in the past three months have you have had more than 3 drinks containing alcohol:  No 
 
 
 
Functional Ability and Level of Safety:  
Hearing: The patient needs further evaluation. currently wearing an amplifier Activities of Daily Living: The home contains: handrails and grab bars Patient does total self care 
 except driving Ambulation: with mild difficulty Fall Risk: 
Fall Risk Assessment, last 12 mths 9/1/2020 Able to walk? Yes Fall in past 12 months? No  
Fall with injury? -  
Number of falls in past 12 months - Fall Risk Score -  
 
Abuse Screen: 
Patient is not abused Cognitive Screening Has your family/caregiver stated any concerns about your memory: no 
 
Cognitive Screening: not needed Assessment/Plan Education and counseling provided: 
Are appropriate based on today's review and evaluation End-of-Life planning (with patient's consent)-see ACP note Pneumococcal Vaccine-done Influenza Vaccine-shortly Cardiovascular screening blood test-next visit Bone mass measurement (DEXA)-osteopenia in 2015, will repeat on her next visit Screening for glaucoma-has an appointment for this in January Diabetes screening test-check A1c next visit Diagnoses and all orders for this visit: 
 
1. Medicare annual wellness visit, subsequent-Refer to above for plan and to patient instructions for recommendations on HM 2. Bilateral hearing loss, unspecified hearing loss type 
-     REFERRAL TO ENT-OTOLARYNGOLOGY 3. Advance directive discussed with patient RTC yearly for wellness visit Health Maintenance Due Health Maintenance Due Topic Date Due  Shingrix Vaccine Age 50> (1 of 2) 12/06/1993  Eye Exam Retinal or Dilated  03/21/2019  GLAUCOMA SCREENING Q2Y  06/22/2019  Foot Exam Q1  06/05/2020  Flu Vaccine (1) 09/01/2020  A1C test (Diabetic or Prediabetic)  09/17/2020 Patient Care Team  
Patient Care Team: 
Corinne Young MD as PCP - General (Internal Medicine) Corinne Young MD as PCP - [facility] Empaneled Provider Jeneen Holstein, MD (Nephrology) Moise Batista MD (Cardiology) Amos Traylor MD (Pulmonary Disease) Eneida Skinner NP (Nurse Practitioner) Christina Ricks MD (Endocrinology) Andrew Quiles MD (Ophthalmology) History Patient Active Problem List  
Diagnosis Code  Gout M10.9  Hyperlipidemia E78.5  Tubular adenoma of colon D12.6  Anemia in chronic kidney disease N18.9, D63.1  MELANIE on CPAP G47.33, Z99.89  
 CHF (congestive heart failure) ([facility]) I50.9  Type 2 diabetes mellitus with hyperglycemia, with long-term current use of insulin ([facility]) E11.65, Z79.4  Advance directive discussed with patient Z70.80  
 Essential hypertension I10  
 Hypothyroidism due to acquired atrophy of thyroid E03.4  ESRD (end stage renal disease) on dialysis ([facility]) N18.6, Z99.2  Type 2 diabetes mellitus with nephropathy ([facility]) E11.21  
 Chronic obstructive pulmonary disease ([facility]) J44.9  Secondary hyperparathyroidism ([facility] N25.81 Past Medical History:  
Diagnosis Date  Anemia in CKD (chronic kidney disease)  Aortic stenosis  Asthma  Calculus of kidney  Cancer ([facility] [address] right kidney  Cardiomyopathy ([facility] [address] EF 20-25% (10/16), 55% (06/16)  Chronic kidney disease   
 dialysis MWF  Chronic kidney disease, stage V ([facility] [address]  Diabetes ([facility]  Dialysis patient [facility])  Dialysis patient [facility]) M-W-F; LEFT AVF; PERM CATH RIGHT CHEST  Gastrointestinal hemorrhage associated with peptic ulcer  Gastrointestinal hemorrhage associated with peptic ulcer 10/5/2016  Gout  Hypercholesterolemia  Hypertension  Menopause  Primary hyperparathyroidism ([facility] [address] 2010  
 s/p parathyroid adenoma excision  Secondary hyperparathyroidism (of renal origin)  Sleep apnea C PAP  Status post nephrectomy   
 right  Thyroid disease Past Surgical History:  
Procedure Laterality Date  COLONOSCOPY  08/28/2015 Repeat 5 years  HX GYN    
 C-sections x 3  
 HX HEENT    
 total thyroidectomy  HX NEPHRECTOMY    
 right  HX THYROIDECTOMY  HX VASCULAR ACCESS    
 dialysis catheter R upper chest  
 STEVIE STEREO  BX BREAST LT 1ST LESION W/CLIP AND SPECIMEN Left 10/31/2017  VASCULAR SURGERY PROCEDURE UNLIST    
 fistula LUE Current Outpatient Medications Medication Sig Dispense Refill  amLODIPine (NORVASC) 5 mg tablet Take 5 mg by mouth daily.  Fozia-Linn 0.8 mg tab tablet Take 1 Tab by mouth daily. 90 Tab 1  
 losartan (COZAAR) 100 mg tablet Take 1 Tab by mouth daily. 90 Tab 1  
 atorvastatin (LIPITOR) 10 mg tablet Take 1 Tab by mouth daily. 90 Tab 1  
 diphenoxylate-atropine (LOMOTIL) 2.5-0.025 mg per tablet Take 1 Tab by mouth two (2) times daily as needed for Diarrhea. Max Daily Amount: 2 Tabs. 60 Tab 1  cholecalciferol (VITAMIN D3) (5000 Units/125 mcg) tab tablet TAKE 1 TABLET BY MOUTH EVERY OTHER  DAY    
  budesonide-formoteroL (Symbicort) 160-4.5 mcg/actuation HFAA Take 2 Puffs by inhalation two (2) times a day. 1 Inhaler 3  
 albuterol (VENTOLIN HFA) 90 mcg/actuation inhaler Take 2 Puffs by inhalation every six (6) hours as needed.  OXYGEN-AIR DELIVERY SYSTEMS Take 3 L by inhalation continuous. Allergies Allergen Reactions  Ace Inhibitors Cough  Aspirin Other (comments) Hallucinations  Hydralazine Other (comments) Hallucinations, dizziness and numbness in legs  Sensipar [Cinacalcet] Unknown (comments) Family History Problem Relation Age of Onset  Hypertension Mother  Diabetes Mother  No Known Problems Father  No Known Problems Sister  No Known Problems Brother  No Known Problems Other  No Known Problems Brother  No Known Problems Brother  No Known Problems Daughter  No Known Problems Son  No Known Problems Son   
 
Social History Tobacco Use  Smoking status: Never Smoker  Smokeless tobacco: Never Used Substance Use Topics  Alcohol use: No  
 
 
Ina Loo, who was evaluated through a synchronous (real-time) audio-video encounter, and/or her healthcare decision maker, is aware that it is a billable service, with coverage as determined by her insurance carrier. She provided verbal consent to proceed: Yes, and patient identification was verified. It was conducted pursuant to the emergency declaration under the [address] [place] [address] authority and the [employer] General Act. A caregiver was present when appropriate. Ability to conduct physical exam was limited. I was at home. The patient was at home.  
 
Fuad Simeon MD

## 2020-12-09 NOTE — ACP (ADVANCE CARE PLANNING)
Advance Care Planning Advance Care Planning (ACP) Physician/NP/PA Conversation Date of Conversation: 12/9/2020 Conducted with: Patient with Decision Making Capacity Healthcare Decision Maker:  
  Primary Decision Maker (Active): [address] - [phone] Secondary Decision Maker: Aleksander Khan - Son - [phone] Click here to complete [address] including selection of the Healthcare Decision Maker Relationship (ie \"Primary\") Care Preferences: Hospitalization: \"If your health worsens and it becomes clear that your chance of recovery is unlikely, what would be your preference regarding hospitalization? \" The patient would prefer hospitalization. Ventilation: \"If you were unable to breathe on your own and your chance of recovery was unlikely, what would be your preference about the use of a ventilator (breathing machine) if it was available to you? \" The patient would desire the use of a ventilator. Resuscitation: \"In the event your heart stopped as a result of an underlying serious health condition, would you want attempts to be made to restart your heart, or would you prefer a natural death? \" Yes, attempt to resuscitate. Additional topics discussed: hospitalization preferences and resuscitation preferences Conversation Outcomes / Follow-Up Plan:  
ACP in process - information provided, considering goals and options Reviewed DNR/DNI and patient elects Full Code (Attempt Resuscitation) Length of Voluntary ACP Conversation in minutes:  <16 minutes (Non-Billable) Siomara Brandt MD

## 2020-12-09 NOTE — PATIENT INSTRUCTIONS
Medicare Wellness Visit, Female The best way to live healthy is to have a lifestyle where you eat a well-balanced diet, exercise regularly, limit alcohol use, and quit all forms of tobacco/nicotine, if applicable. Regular preventive services are another way to keep healthy. Preventive services (vaccines, screening tests, monitoring & exams) can help personalize your care plan, which helps you manage your own care. Screening tests can find health problems at the earliest stages, when they are easiest to treat. [facility] follows the current, evidence-based guidelines published by the [facility] ([facility]) when recommending preventive services for our patients. Because we follow these guidelines, sometimes recommendations change over time as research supports it. (For example, mammograms used to be recommended annually. Even though Medicare will still pay for an annual mammogram, the newer guidelines recommend a mammogram every two years for women of average risk). Of course, you and your doctor may decide to screen more often for some diseases, based on your risk and your co-morbidities (chronic disease you are already diagnosed with). Preventive services for you include: - Medicare offers their members a free annual wellness visit, which is time for you and your primary care provider to discuss and plan for your preventive service needs. Take advantage of this benefit every year! 
-All adults over the age of 72 should receive the recommended pneumonia vaccines. Current USPSTF guidelines recommend a series of two vaccines for the best pneumonia protection.  
-All adults should have a flu vaccine yearly and a tetanus vaccine every 10 years.  
-All adults age 48 and older should receive the shingles vaccines (series of two vaccines). -All adults age 38-68 who are overweight should have a diabetes screening test once every three years. -All adults born between [birth year] and 1967 should be screened once for Hepatitis C. 
-Other screening tests and preventive services for persons with diabetes include: an eye exam to screen for diabetic retinopathy, a kidney function test, a foot exam, and stricter control over your cholesterol.  
-Cardiovascular screening for adults with routine risk involves an electrocardiogram (ECG) at intervals determined by your doctor.  
-Colorectal cancer screenings should be done for adults age 54-65 with no increased risk factors for colorectal cancer. There are a number of acceptable methods of screening for this type of cancer. Each test has its own benefits and drawbacks. Discuss with your doctor what is most appropriate for you during your annual wellness visit. The different tests include: colonoscopy (considered the best screening method), a fecal occult blood test, a fecal DNA test, and sigmoidoscopy. 
 
-A bone mass density test is recommended when a woman turns 65 to screen for osteoporosis. This test is only recommended one time, as a screening. Some providers will use this same test as a disease monitoring tool if you already have osteoporosis. -Breast cancer screenings are recommended every other year for women of normal risk, age 54-69. 
-Cervical cancer screenings for women over age 72 are only recommended with certain risk factors. Here is a list of your current Health Maintenance items (your personalized list of preventive services) with a due date: 
Health Maintenance Due Topic Date Due  Shingles Vaccine (1 of 2) 12/06/1993 [address] [place] [address] Eye Exam  03/21/2019  Glaucoma Screening   06/22/2019 [address] [place] [address] Annual Well Visit  06/03/2020 [address] [place] [address] Diabetic Foot Care  06/05/2020  Yearly Flu Vaccine (1) 09/01/2020  Hemoglobin A1C    09/17/2020

## 2020-12-09 NOTE — PROGRESS NOTES
Chief Complaint Patient presents with  Follow Up Chronic Condition 1. Have you been to the ER, urgent care clinic since your last visit? Hospitalized since your last visit? No 
 
2. Have you seen or consulted any other health care providers outside of the [address] [place] [address] since your last visit? Include any pap smears or colon screening. Yes Where: Orthopedic Health Maintenance Due Topic  Shingrix Vaccine Age 50> (1 of 2)  Eye Exam Retinal or Dilated  GLAUCOMA SCREENING Q2Y  Medicare Yearly Exam   
 Foot Exam Q1   
 Flu Vaccine (1)  A1C test (Diabetic or Prediabetic)

## (undated) DEVICE — FLEX ADVANTAGE 1500CC: Brand: FLEX ADVANTAGE

## (undated) DEVICE — DERMABOND SKIN ADH 0.7ML -- DERMABOND ADVANCED 12/BX

## (undated) DEVICE — SUT PROL 6-0 24IN BV1 DA BLU --

## (undated) DEVICE — SOLUTION IRRIG 1000ML H2O STRL BLT

## (undated) DEVICE — HEX-LOCKING BLADE ELECTRODE: Brand: EDGE

## (undated) DEVICE — SUT SLK 4-0 18IN TIE MP BLK --

## (undated) DEVICE — MASTISOL ADHESIVE LIQ 2/3ML

## (undated) DEVICE — KENDALL 500 SERIES DIAPHORETIC FOAM MONITORING ELECTRODE - TEAR DROP SHAPE ( 30/PK): Brand: KENDALL

## (undated) DEVICE — VESSEL LOOPS,MAXI, RED: Brand: DEVON

## (undated) DEVICE — PREP SKN CHLRAPRP 26ML TNT -- CONVERT TO ITEM 373320

## (undated) DEVICE — SUT SLK 3-0 30IN SH BLK --

## (undated) DEVICE — KIT COLON W/ 1.1OZ LUB AND 2 END

## (undated) DEVICE — (D)SYR 10ML 1/5ML GRAD NSAF -- PKGING CHANGE USE ITEM 338027

## (undated) DEVICE — KENDALL SCD EXPRESS SLEEVES, KNEE LENGTH, MEDIUM: Brand: KENDALL SCD

## (undated) DEVICE — SUTURE VCRL SZ 3-0 L27IN ABSRB UD L26MM SH 1/2 CIR J416H

## (undated) DEVICE — 3M™ STERI-STRIP™ REINFORCED ADHESIVE SKIN CLOSURES, R1548, 1 IN X 5 IN (25 MM X 125 MM), 4 STRIPS/ENVELOPE: Brand: 3M™ STERI-STRIP™

## (undated) DEVICE — DEPAUL AV FISTULA PACK: Brand: MEDLINE INDUSTRIES, INC.

## (undated) DEVICE — COVER US PRB W15XL120CM W/ GEL RUBBERBAND TAPE STRP FLD GEN

## (undated) DEVICE — FORCEPS BX L240CM JAW DIA2.8MM L CAP W/ NDL MIC MESH TOOTH

## (undated) DEVICE — SOLUTION IV 500ML 0.9% SOD CHL FLX CONT

## (undated) DEVICE — SUTURE PERMAHAND SZ 2-0 L12X18IN NONABSORBABLE BLK SILK A185H

## (undated) DEVICE — (D)GLOVE SURG TRIFLX 7.5 PWD L -- DISC BY MFR USE ITEM 302993

## (undated) DEVICE — BITE BLK ENDOSCP AD 54FR GRN POLYETH ENDOSCP W STRP SLD

## (undated) DEVICE — SOLUTION IV 1000ML 0.9% SOD CHL

## (undated) DEVICE — SYR 50ML SLIP TIP NSAF LF STRL --

## (undated) DEVICE — REM POLYHESIVE ADULT PATIENT RETURN ELECTRODE: Brand: VALLEYLAB

## (undated) DEVICE — INTENDED TO BE USED TO OCCLUDE, RETRACT AND IDENTIFY ARTERIES, VEINS, TENDONS AND NERVES IN SURGICAL PROCEDURES: Brand: STERION®  VESSEL LOOP

## (undated) DEVICE — MEDI-VAC NON-CONDUCTIVE SUCTION TUBING: Brand: CARDINAL HEALTH

## (undated) DEVICE — GAUZE,SPONGE,8"X4",12PLY,XRAY,STRL,LF: Brand: MEDLINE

## (undated) DEVICE — SUTURE PERMAHAND SZ 3-0 L18IN NONABSORBABLE BLK SILK BRAID A184H

## (undated) DEVICE — GOWN,SIRUS,POLYRNF,BRTHSLV,XL,30/CS: Brand: MEDLINE

## (undated) DEVICE — BASIN EMESIS 500CC ROSE 250/CS 60/PLT: Brand: MEDEGEN MEDICAL PRODUCTS, LLC